# Patient Record
Sex: FEMALE | Race: WHITE | NOT HISPANIC OR LATINO | Employment: PART TIME | URBAN - METROPOLITAN AREA
[De-identification: names, ages, dates, MRNs, and addresses within clinical notes are randomized per-mention and may not be internally consistent; named-entity substitution may affect disease eponyms.]

---

## 2017-02-02 ENCOUNTER — ALLSCRIPTS OFFICE VISIT (OUTPATIENT)
Dept: OTHER | Facility: OTHER | Age: 37
End: 2017-02-02

## 2017-02-03 ENCOUNTER — GENERIC CONVERSION - ENCOUNTER (OUTPATIENT)
Dept: OTHER | Facility: OTHER | Age: 37
End: 2017-02-03

## 2017-02-10 ENCOUNTER — ALLSCRIPTS OFFICE VISIT (OUTPATIENT)
Dept: OTHER | Facility: OTHER | Age: 37
End: 2017-02-10

## 2017-02-10 DIAGNOSIS — S16.1XXA STRAIN OF MUSCLE, FASCIA AND TENDON AT NECK LEVEL, INITIAL ENCOUNTER: ICD-10-CM

## 2017-02-10 DIAGNOSIS — S39.012A STRAIN OF MUSCLE, FASCIA AND TENDON OF LOWER BACK, INITIAL ENCOUNTER: ICD-10-CM

## 2017-02-14 ENCOUNTER — GENERIC CONVERSION - ENCOUNTER (OUTPATIENT)
Dept: OTHER | Facility: OTHER | Age: 37
End: 2017-02-14

## 2017-02-22 ENCOUNTER — ALLSCRIPTS OFFICE VISIT (OUTPATIENT)
Dept: OTHER | Facility: OTHER | Age: 37
End: 2017-02-22

## 2017-03-09 ENCOUNTER — ALLSCRIPTS OFFICE VISIT (OUTPATIENT)
Dept: OTHER | Facility: OTHER | Age: 37
End: 2017-03-09

## 2017-03-16 ENCOUNTER — ALLSCRIPTS OFFICE VISIT (OUTPATIENT)
Dept: OTHER | Facility: OTHER | Age: 37
End: 2017-03-16

## 2017-03-23 ENCOUNTER — ALLSCRIPTS OFFICE VISIT (OUTPATIENT)
Dept: OTHER | Facility: OTHER | Age: 37
End: 2017-03-23

## 2017-03-30 ENCOUNTER — ALLSCRIPTS OFFICE VISIT (OUTPATIENT)
Dept: OTHER | Facility: OTHER | Age: 37
End: 2017-03-30

## 2017-05-10 ENCOUNTER — ALLSCRIPTS OFFICE VISIT (OUTPATIENT)
Dept: OTHER | Facility: OTHER | Age: 37
End: 2017-05-10

## 2017-05-25 ENCOUNTER — ALLSCRIPTS OFFICE VISIT (OUTPATIENT)
Dept: OTHER | Facility: OTHER | Age: 37
End: 2017-05-25

## 2017-05-25 DIAGNOSIS — K76.89 OTHER SPECIFIED DISORDERS OF LIVER: ICD-10-CM

## 2017-05-25 DIAGNOSIS — E55.9 VITAMIN D DEFICIENCY: ICD-10-CM

## 2017-06-23 ENCOUNTER — ALLSCRIPTS OFFICE VISIT (OUTPATIENT)
Dept: OTHER | Facility: OTHER | Age: 37
End: 2017-06-23

## 2017-06-26 ENCOUNTER — GENERIC CONVERSION - ENCOUNTER (OUTPATIENT)
Dept: OTHER | Facility: OTHER | Age: 37
End: 2017-06-26

## 2017-06-29 ENCOUNTER — ALLSCRIPTS OFFICE VISIT (OUTPATIENT)
Dept: OTHER | Facility: OTHER | Age: 37
End: 2017-06-29

## 2017-06-29 DIAGNOSIS — S16.1XXA STRAIN OF MUSCLE, FASCIA AND TENDON AT NECK LEVEL, INITIAL ENCOUNTER: ICD-10-CM

## 2017-06-29 DIAGNOSIS — M54.12 RADICULOPATHY OF CERVICAL REGION: ICD-10-CM

## 2017-06-29 DIAGNOSIS — M54.6 PAIN IN THORACIC SPINE: ICD-10-CM

## 2017-06-29 DIAGNOSIS — M54.2 CERVICALGIA: ICD-10-CM

## 2017-07-10 ENCOUNTER — ALLSCRIPTS OFFICE VISIT (OUTPATIENT)
Dept: OTHER | Facility: OTHER | Age: 37
End: 2017-07-10

## 2017-07-12 ENCOUNTER — GENERIC CONVERSION - ENCOUNTER (OUTPATIENT)
Dept: OTHER | Facility: OTHER | Age: 37
End: 2017-07-12

## 2017-07-12 ENCOUNTER — ALLSCRIPTS OFFICE VISIT (OUTPATIENT)
Dept: OTHER | Facility: OTHER | Age: 37
End: 2017-07-12

## 2017-07-24 ENCOUNTER — ALLSCRIPTS OFFICE VISIT (OUTPATIENT)
Dept: OTHER | Facility: OTHER | Age: 37
End: 2017-07-24

## 2017-08-03 ENCOUNTER — ALLSCRIPTS OFFICE VISIT (OUTPATIENT)
Dept: OTHER | Facility: OTHER | Age: 37
End: 2017-08-03

## 2017-08-04 ENCOUNTER — APPOINTMENT (OUTPATIENT)
Dept: LAB | Facility: CLINIC | Age: 37
End: 2017-08-04
Payer: COMMERCIAL

## 2017-08-04 ENCOUNTER — ALLSCRIPTS OFFICE VISIT (OUTPATIENT)
Dept: OTHER | Facility: OTHER | Age: 37
End: 2017-08-04

## 2017-08-04 ENCOUNTER — TRANSCRIBE ORDERS (OUTPATIENT)
Dept: LAB | Facility: CLINIC | Age: 37
End: 2017-08-04

## 2017-08-04 ENCOUNTER — GENERIC CONVERSION - ENCOUNTER (OUTPATIENT)
Dept: OTHER | Facility: OTHER | Age: 37
End: 2017-08-04

## 2017-08-04 DIAGNOSIS — A09 INFECTIOUS GASTROENTERITIS AND COLITIS: ICD-10-CM

## 2017-08-04 DIAGNOSIS — R31.29 OTHER MICROSCOPIC HEMATURIA: ICD-10-CM

## 2017-08-04 LAB
ALBUMIN SERPL BCP-MCNC: 3.3 G/DL (ref 3.5–5)
ALP SERPL-CCNC: 56 U/L (ref 46–116)
ALT SERPL W P-5'-P-CCNC: 28 U/L (ref 12–78)
ANION GAP SERPL CALCULATED.3IONS-SCNC: 8 MMOL/L (ref 4–13)
AST SERPL W P-5'-P-CCNC: 22 U/L (ref 5–45)
BASOPHILS # BLD AUTO: 0.01 THOUSANDS/ΜL (ref 0–0.1)
BASOPHILS NFR BLD AUTO: 0 % (ref 0–1)
BILIRUB SERPL-MCNC: 0.26 MG/DL (ref 0.2–1)
BUN SERPL-MCNC: 9 MG/DL (ref 5–25)
CALCIUM SERPL-MCNC: 9 MG/DL (ref 8.3–10.1)
CHLORIDE SERPL-SCNC: 108 MMOL/L (ref 100–108)
CO2 SERPL-SCNC: 23 MMOL/L (ref 21–32)
CREAT SERPL-MCNC: 0.61 MG/DL (ref 0.6–1.3)
CRP SERPL QL: 65.4 MG/L
EOSINOPHIL # BLD AUTO: 0.03 THOUSAND/ΜL (ref 0–0.61)
EOSINOPHIL NFR BLD AUTO: 1 % (ref 0–6)
ERYTHROCYTE [DISTWIDTH] IN BLOOD BY AUTOMATED COUNT: 13.8 % (ref 11.6–15.1)
GFR SERPL CREATININE-BSD FRML MDRD: 118 ML/MIN/1.73SQ M
GLUCOSE SERPL-MCNC: 86 MG/DL (ref 65–140)
HCT VFR BLD AUTO: 36.4 % (ref 34.8–46.1)
HGB BLD-MCNC: 12 G/DL (ref 11.5–15.4)
LYMPHOCYTES # BLD AUTO: 0.85 THOUSANDS/ΜL (ref 0.6–4.47)
LYMPHOCYTES NFR BLD AUTO: 16 % (ref 14–44)
MCH RBC QN AUTO: 29.6 PG (ref 26.8–34.3)
MCHC RBC AUTO-ENTMCNC: 33 G/DL (ref 31.4–37.4)
MCV RBC AUTO: 90 FL (ref 82–98)
MONOCYTES # BLD AUTO: 0.29 THOUSAND/ΜL (ref 0.17–1.22)
MONOCYTES NFR BLD AUTO: 5 % (ref 4–12)
NEUTROPHILS # BLD AUTO: 4.3 THOUSANDS/ΜL (ref 1.85–7.62)
NEUTS SEG NFR BLD AUTO: 78 % (ref 43–75)
NRBC BLD AUTO-RTO: 0 /100 WBCS
PLATELET # BLD AUTO: 277 THOUSANDS/UL (ref 149–390)
PMV BLD AUTO: 9.5 FL (ref 8.9–12.7)
POTASSIUM SERPL-SCNC: 3.3 MMOL/L (ref 3.5–5.3)
PROT SERPL-MCNC: 6.8 G/DL (ref 6.4–8.2)
RBC # BLD AUTO: 4.06 MILLION/UL (ref 3.81–5.12)
SODIUM SERPL-SCNC: 139 MMOL/L (ref 136–145)
WBC # BLD AUTO: 5.48 THOUSAND/UL (ref 4.31–10.16)

## 2017-08-04 PROCEDURE — 86140 C-REACTIVE PROTEIN: CPT

## 2017-08-04 PROCEDURE — 80053 COMPREHEN METABOLIC PANEL: CPT

## 2017-08-04 PROCEDURE — 85025 COMPLETE CBC W/AUTO DIFF WBC: CPT

## 2017-08-04 PROCEDURE — 36415 COLL VENOUS BLD VENIPUNCTURE: CPT

## 2017-08-04 PROCEDURE — 87040 BLOOD CULTURE FOR BACTERIA: CPT

## 2017-08-05 LAB — WRITTEN AUTHORIZATION (HISTORICAL): NORMAL

## 2017-08-08 LAB
RESULT 1 (HISTORICAL): ABNORMAL
RESULT 1 (HISTORICAL): ABNORMAL
SHIGA TOXIN TEST (HISTORICAL): POSITIVE
STOOL FOR CAMPYLOBACTER (HISTORICAL): ABNORMAL
STOOL FOR SALMONELLA OR SHIGELLA (HISTORICAL): ABNORMAL

## 2017-08-09 LAB — BACTERIA BLD CULT: NORMAL

## 2017-08-10 ENCOUNTER — TRANSCRIBE ORDERS (OUTPATIENT)
Dept: ADMINISTRATIVE | Age: 37
End: 2017-08-10

## 2017-08-10 ENCOUNTER — APPOINTMENT (OUTPATIENT)
Dept: LAB | Age: 37
End: 2017-08-10
Attending: PREVENTIVE MEDICINE
Payer: COMMERCIAL

## 2017-08-10 ENCOUNTER — APPOINTMENT (OUTPATIENT)
Dept: LAB | Facility: CLINIC | Age: 37
End: 2017-08-10
Payer: COMMERCIAL

## 2017-08-10 ENCOUNTER — TRANSCRIBE ORDERS (OUTPATIENT)
Dept: LAB | Facility: CLINIC | Age: 37
End: 2017-08-10

## 2017-08-10 ENCOUNTER — GENERIC CONVERSION - ENCOUNTER (OUTPATIENT)
Dept: OTHER | Facility: OTHER | Age: 37
End: 2017-08-10

## 2017-08-10 DIAGNOSIS — Z11.1 SCREENING EXAMINATION FOR PULMONARY TUBERCULOSIS: Primary | ICD-10-CM

## 2017-08-10 DIAGNOSIS — A09 INFECTIOUS COLITIS, ENTERITIS, AND GASTROENTERITIS: ICD-10-CM

## 2017-08-10 DIAGNOSIS — Z11.1 SCREENING EXAMINATION FOR PULMONARY TUBERCULOSIS: ICD-10-CM

## 2017-08-10 DIAGNOSIS — A09 INFECTIOUS COLITIS, ENTERITIS, AND GASTROENTERITIS: Primary | ICD-10-CM

## 2017-08-10 LAB
ALBUMIN SERPL BCP-MCNC: 3.4 G/DL (ref 3.5–5)
ALP SERPL-CCNC: 65 U/L (ref 46–116)
ALT SERPL W P-5'-P-CCNC: 23 U/L (ref 12–78)
ANION GAP SERPL CALCULATED.3IONS-SCNC: 9 MMOL/L (ref 4–13)
AST SERPL W P-5'-P-CCNC: 18 U/L (ref 5–45)
BACTERIA UR QL AUTO: ABNORMAL /HPF
BASOPHILS # BLD AUTO: 0.05 THOUSANDS/ΜL (ref 0–0.1)
BASOPHILS NFR BLD AUTO: 1 % (ref 0–1)
BILIRUB SERPL-MCNC: 0.32 MG/DL (ref 0.2–1)
BILIRUB UR QL STRIP: NEGATIVE
BUN SERPL-MCNC: 11 MG/DL (ref 5–25)
CALCIUM SERPL-MCNC: 8.8 MG/DL (ref 8.3–10.1)
CHLORIDE SERPL-SCNC: 106 MMOL/L (ref 100–108)
CLARITY UR: CLEAR
CO2 SERPL-SCNC: 25 MMOL/L (ref 21–32)
COLOR UR: YELLOW
CREAT SERPL-MCNC: 0.63 MG/DL (ref 0.6–1.3)
EOSINOPHIL # BLD AUTO: 0.19 THOUSAND/ΜL (ref 0–0.61)
EOSINOPHIL NFR BLD AUTO: 3 % (ref 0–6)
ERYTHROCYTE [DISTWIDTH] IN BLOOD BY AUTOMATED COUNT: 13.7 % (ref 11.6–15.1)
GFR SERPL CREATININE-BSD FRML MDRD: 117 ML/MIN/1.73SQ M
GLUCOSE SERPL-MCNC: 80 MG/DL (ref 65–140)
GLUCOSE UR STRIP-MCNC: NEGATIVE MG/DL
HCT VFR BLD AUTO: 36.2 % (ref 34.8–46.1)
HGB BLD-MCNC: 11.9 G/DL (ref 11.5–15.4)
HGB UR QL STRIP.AUTO: ABNORMAL
HYALINE CASTS #/AREA URNS LPF: ABNORMAL /LPF
KETONES UR STRIP-MCNC: NEGATIVE MG/DL
LEUKOCYTE ESTERASE UR QL STRIP: NEGATIVE
LYMPHOCYTES # BLD AUTO: 1.61 THOUSANDS/ΜL (ref 0.6–4.47)
LYMPHOCYTES NFR BLD AUTO: 28 % (ref 14–44)
MCH RBC QN AUTO: 29.2 PG (ref 26.8–34.3)
MCHC RBC AUTO-ENTMCNC: 32.9 G/DL (ref 31.4–37.4)
MCV RBC AUTO: 89 FL (ref 82–98)
MONOCYTES # BLD AUTO: 0.75 THOUSAND/ΜL (ref 0.17–1.22)
MONOCYTES NFR BLD AUTO: 13 % (ref 4–12)
NEUTROPHILS # BLD AUTO: 3.18 THOUSANDS/ΜL (ref 1.85–7.62)
NEUTS SEG NFR BLD AUTO: 55 % (ref 43–75)
NITRITE UR QL STRIP: NEGATIVE
NON-SQ EPI CELLS URNS QL MICRO: ABNORMAL /HPF
NRBC BLD AUTO-RTO: 0 /100 WBCS
PH UR STRIP.AUTO: 6 [PH] (ref 4.5–8)
PLATELET # BLD AUTO: 410 THOUSANDS/UL (ref 149–390)
PMV BLD AUTO: 9.4 FL (ref 8.9–12.7)
POTASSIUM SERPL-SCNC: 3.6 MMOL/L (ref 3.5–5.3)
PROT SERPL-MCNC: 6.8 G/DL (ref 6.4–8.2)
PROT UR STRIP-MCNC: NEGATIVE MG/DL
RBC # BLD AUTO: 4.07 MILLION/UL (ref 3.81–5.12)
RBC #/AREA URNS AUTO: ABNORMAL /HPF
RESULT 1 (HISTORICAL): ABNORMAL
RESULT 1 (HISTORICAL): ABNORMAL
SHIGA TOXIN TEST (HISTORICAL): POSITIVE
SODIUM SERPL-SCNC: 140 MMOL/L (ref 136–145)
SP GR UR STRIP.AUTO: 1.02 (ref 1–1.03)
STOOL FOR CAMPYLOBACTER (HISTORICAL): ABNORMAL
STOOL FOR SALMONELLA OR SHIGELLA (HISTORICAL): ABNORMAL
SUSCEP. REFLEX (HISTORICAL): ABNORMAL
UROBILINOGEN UR QL STRIP.AUTO: 0.2 E.U./DL
WBC # BLD AUTO: 5.81 THOUSAND/UL (ref 4.31–10.16)
WBC #/AREA URNS AUTO: ABNORMAL /HPF
WRITTEN AUTHORIZATION (HISTORICAL): NORMAL

## 2017-08-10 PROCEDURE — 36415 COLL VENOUS BLD VENIPUNCTURE: CPT

## 2017-08-10 PROCEDURE — 85025 COMPLETE CBC W/AUTO DIFF WBC: CPT

## 2017-08-10 PROCEDURE — 86480 TB TEST CELL IMMUN MEASURE: CPT

## 2017-08-10 PROCEDURE — 80053 COMPREHEN METABOLIC PANEL: CPT

## 2017-08-10 PROCEDURE — 81001 URINALYSIS AUTO W/SCOPE: CPT

## 2017-08-12 LAB
ANNOTATION COMMENT IMP: NORMAL
GAMMA INTERFERON BACKGROUND BLD IA-ACNC: 0.06 IU/ML
M TB IFN-G BLD-IMP: NEGATIVE
M TB IFN-G CD4+ BCKGRND COR BLD-ACNC: 0.03 IU/ML
M TB IFN-G CD4+ T-CELLS BLD-ACNC: 0.09 IU/ML
MITOGEN IGNF BLD-ACNC: >10 IU/ML
QUANTIFERON-TB GOLD IN TUBE: NORMAL
SERVICE CMNT-IMP: NORMAL

## 2017-08-14 ENCOUNTER — GENERIC CONVERSION - ENCOUNTER (OUTPATIENT)
Dept: OTHER | Facility: OTHER | Age: 37
End: 2017-08-14

## 2017-08-22 ENCOUNTER — APPOINTMENT (OUTPATIENT)
Dept: LAB | Facility: CLINIC | Age: 37
End: 2017-08-22
Payer: COMMERCIAL

## 2017-08-22 ENCOUNTER — TRANSCRIBE ORDERS (OUTPATIENT)
Dept: LAB | Facility: CLINIC | Age: 37
End: 2017-08-22

## 2017-08-22 DIAGNOSIS — A09 INFECTIOUS GASTROENTERITIS AND COLITIS: ICD-10-CM

## 2017-08-22 DIAGNOSIS — R31.29 OTHER MICROSCOPIC HEMATURIA: ICD-10-CM

## 2017-08-22 LAB
BACTERIA UR QL AUTO: ABNORMAL /HPF
BILIRUB UR QL STRIP: NEGATIVE
CLARITY UR: CLEAR
COLOR UR: YELLOW
GLUCOSE UR STRIP-MCNC: NEGATIVE MG/DL
HGB UR QL STRIP.AUTO: NEGATIVE
HYALINE CASTS #/AREA URNS LPF: ABNORMAL /LPF
KETONES UR STRIP-MCNC: NEGATIVE MG/DL
LEUKOCYTE ESTERASE UR QL STRIP: ABNORMAL
NITRITE UR QL STRIP: NEGATIVE
NON-SQ EPI CELLS URNS QL MICRO: ABNORMAL /HPF
PH UR STRIP.AUTO: 6 [PH] (ref 4.5–8)
PROT UR STRIP-MCNC: NEGATIVE MG/DL
RBC #/AREA URNS AUTO: ABNORMAL /HPF
SP GR UR STRIP.AUTO: 1.02 (ref 1–1.03)
UROBILINOGEN UR QL STRIP.AUTO: 0.2 E.U./DL
WBC #/AREA URNS AUTO: ABNORMAL /HPF

## 2017-08-22 PROCEDURE — 81001 URINALYSIS AUTO W/SCOPE: CPT

## 2017-08-30 LAB — Lab: NORMAL

## 2017-09-06 DIAGNOSIS — M54.50 LOW BACK PAIN: ICD-10-CM

## 2017-09-06 DIAGNOSIS — R31.29 OTHER MICROSCOPIC HEMATURIA: ICD-10-CM

## 2017-09-06 DIAGNOSIS — A09 INFECTIOUS GASTROENTERITIS AND COLITIS: ICD-10-CM

## 2017-09-06 DIAGNOSIS — M41.9 SCOLIOSIS: ICD-10-CM

## 2017-09-06 DIAGNOSIS — M54.31 SCIATICA OF RIGHT SIDE: ICD-10-CM

## 2017-09-08 ENCOUNTER — GENERIC CONVERSION - ENCOUNTER (OUTPATIENT)
Dept: OTHER | Facility: OTHER | Age: 37
End: 2017-09-08

## 2017-09-12 ENCOUNTER — GENERIC CONVERSION - ENCOUNTER (OUTPATIENT)
Dept: OTHER | Facility: OTHER | Age: 37
End: 2017-09-12

## 2017-09-29 ENCOUNTER — ALLSCRIPTS OFFICE VISIT (OUTPATIENT)
Dept: OTHER | Facility: OTHER | Age: 37
End: 2017-09-29

## 2017-09-29 ENCOUNTER — GENERIC CONVERSION - ENCOUNTER (OUTPATIENT)
Dept: OTHER | Facility: OTHER | Age: 37
End: 2017-09-29

## 2017-10-13 ENCOUNTER — GENERIC CONVERSION - ENCOUNTER (OUTPATIENT)
Dept: OTHER | Facility: OTHER | Age: 37
End: 2017-10-13

## 2017-11-16 ENCOUNTER — ALLSCRIPTS OFFICE VISIT (OUTPATIENT)
Dept: OTHER | Facility: OTHER | Age: 37
End: 2017-11-16

## 2017-12-06 ENCOUNTER — LAB REQUISITION (OUTPATIENT)
Dept: LAB | Facility: HOSPITAL | Age: 37
End: 2017-12-06
Payer: COMMERCIAL

## 2017-12-06 ENCOUNTER — GENERIC CONVERSION - ENCOUNTER (OUTPATIENT)
Dept: OTHER | Facility: OTHER | Age: 37
End: 2017-12-06

## 2017-12-06 DIAGNOSIS — N39.0 URINARY TRACT INFECTION: ICD-10-CM

## 2017-12-06 DIAGNOSIS — M62.838 OTHER MUSCLE SPASM: ICD-10-CM

## 2017-12-06 DIAGNOSIS — M99.9 BIOMECHANICAL LESION: ICD-10-CM

## 2017-12-06 PROCEDURE — 87086 URINE CULTURE/COLONY COUNT: CPT | Performed by: FAMILY MEDICINE

## 2017-12-07 LAB — BACTERIA UR CULT: NORMAL

## 2017-12-27 ENCOUNTER — GENERIC CONVERSION - ENCOUNTER (OUTPATIENT)
Dept: OTHER | Facility: OTHER | Age: 37
End: 2017-12-27

## 2018-01-11 NOTE — MISCELLANEOUS
Message  Return to work or school:        Flu vaccine deferred until acute illness resolved  Darleen ESPARZA        Signatures   Electronically signed by : ISAIAS Ochoa; Nov 16 2017  1:11PM EST                       (Author)

## 2018-01-12 VITALS
HEIGHT: 64 IN | SYSTOLIC BLOOD PRESSURE: 120 MMHG | BODY MASS INDEX: 21 KG/M2 | RESPIRATION RATE: 18 BRPM | OXYGEN SATURATION: 99 % | WEIGHT: 123 LBS | DIASTOLIC BLOOD PRESSURE: 86 MMHG | HEART RATE: 76 BPM

## 2018-01-12 VITALS
SYSTOLIC BLOOD PRESSURE: 116 MMHG | HEIGHT: 64 IN | TEMPERATURE: 97.1 F | HEART RATE: 96 BPM | WEIGHT: 128 LBS | DIASTOLIC BLOOD PRESSURE: 82 MMHG | BODY MASS INDEX: 21.85 KG/M2

## 2018-01-12 VITALS
BODY MASS INDEX: 21.85 KG/M2 | DIASTOLIC BLOOD PRESSURE: 88 MMHG | WEIGHT: 128 LBS | OXYGEN SATURATION: 99 % | SYSTOLIC BLOOD PRESSURE: 128 MMHG | HEIGHT: 64 IN | TEMPERATURE: 97.1 F | HEART RATE: 95 BPM | RESPIRATION RATE: 18 BRPM

## 2018-01-12 VITALS
OXYGEN SATURATION: 98 % | HEIGHT: 64 IN | DIASTOLIC BLOOD PRESSURE: 100 MMHG | HEART RATE: 98 BPM | BODY MASS INDEX: 21 KG/M2 | RESPIRATION RATE: 18 BRPM | WEIGHT: 123 LBS | SYSTOLIC BLOOD PRESSURE: 144 MMHG

## 2018-01-12 NOTE — RESULT NOTES
Verified Results  * MRI THORACIC SPINE W WO CONTRAST [id number] 09:43AM [facility]     Test Name Result Flag Reference   MRI THORACIC SPINE W [address] (Report)     This is a summary report  The complete report is available in the patient's medical record  If you cannot access the medical record, please contact the sending organization for a detailed fax or copy  MRI THORACIC SPINE WITH AND WITHOUT CONTRAST     INDICATION: Chronic mid thoracic spine pain often to the left of midline, along the border of scapula, history of scoliosis     COMPARISON: None  TECHNIQUE: Sagittal T1, sagittal T2, sagittal inversion recovery, axial T2, axial 2D MERGE  Sagittal and axial T1 postcontrast      IV Contrast: Gadobutrol injection (SINGLE-DOSE) SOLN 5 mL Note: (SINGLE DOSE/MULTI DOSE) information refers to the container from which the contrast was acquired  Contrast was injected one time intravenously without immediate complication  IMAGE QUALITY: Diagnostic  FINDINGS:     ALIGNMENT: Straightening of normal thoracic kyphosis  Right convex T7-8 apex scoliosis  Marked degenerative changes along can cavity of this curve  No segmentation anomalies are evident  MARROW SIGNAL: Normal marrow signal is identified within the visualized bony structures  No discrete marrow lesion  THORACIC CORD: Normal signal within the thoracic cord  No intrinsic cord pathology or cord compression  Conus terminates below the L1 level and, is not included on this study  Cord is displaced towards the concavity of scoliosis  PREVERTEBRAL AND PARASPINAL SOFT TISSUES: Prevertebral and paraspinal soft tissues are unremarkable  THORACIC DEGENERATIVE CHANGE: No disc herniation, canal stenosis or foraminal narrowing  No degenerative changes  POSTCONTRAST: No abnormal enhancement  IMPRESSION:     Right convex mid thoracic scoliosis  No segmentation anomalies or intrinsic pathology of the thoracic cord   Termination of the cord below the L1 level and not included on this study  No compressive cord or root disease  Workstation performed: [id number]     Signed by: Julia Maloney MD   12/28/16     *US BREAST LEFT LIMITED (DIAGNOSTIC) [id number] 08:51AM [facility] Order Number: [id number]    - Patient Instructions: To schedule this appointment, please contact Central Scheduling at [phone]  Test Name Result Flag Reference   US BREAST LEFT LIMITED (Report)     Follow-up breast cysts  US Breast Left Limited: December 28, 2016 - Check In #: [[id number]]   Technologist: MARÍA Groves RDMS RVT RDCS   At the 3 o'clock position of the left breast is a slightly    complex cyst measures 0 3 x 0 2 x 0 3 cm, previously measured at    0 2 cm  Also at the 3 o'clock position, 4 cm from the nipple is a complex   cyst which measures 0 5 x 0 3 x 0 4 cm, previously 0 6 x 0 4 x    0 4 cm  No suspicious hypoechoic mass or architectural    distortion to suggest malignancy  Two slightly complex cysts which appear stable  ASSESSMENT: BiRad:2 - Benign     Recommendation:   Clinical management of the left breast      Transcription Location: [facility] 98: [id number]     Risk Value(s):   Tyrer-Cuzick 10 Year: 0 764%, Tyrer-Cuzick Lifetime: 11 799%,    Myriad Table: 1 5%   Signed by:   Chilo Blackman MD   12/28/16     (1) CBC/ PLT (NO DIFF) [id number] 02:23PM [facility]     Test Name Result Flag Reference   WBC 5 4 x10E3/uL  3 4-10 8   RBC 3 90 x10E6/uL  3 77-5 28   Hemoglobin 12 2 g/dL  11 1-15 9   Hematocrit 36 5 %  34 0-46  6   MCV 94 fL  79-97   MCH 31 3 pg  26 6-33 0   MCHC 33 4 g/dL  31 5-35 7   RDW 13 2 %  12 3-15 4   Platelets 883 T57J6/DT  150-379     (1) COMPREHENSIVE METABOLIC PANEL 95PMV1889 96:35IS Mellody Clipper     Test Name Result Flag Reference   Glucose, Serum 103 mg/dL H 65-99   BUN 16 mg/dL  6-20   Creatinine, Serum 0 74 mg/dL  0 57-1 00   eGFR If NonAfricn Am 106 mL/min/1 73  >59   eGFR If Africn Am 122 mL/min/1 73  >59   BUN/Creatinine Ratio 22 H 8-20   Sodium, Serum 140 mmol/L  134-144   Potassium, Serum 4 3 mmol/L  3 5-5 2   Chloride, Serum 103 mmol/L     Carbon Dioxide, Total 22 mmol/L  18-29   Calcium, Serum 8 7 mg/dL  8 7-10 2   Protein, Total, Serum 6 3 g/dL  6 0-8 5   Albumin, Serum 4 0 g/dL  3 5-5 5   Globulin, Total 2 3 g/dL  1 5-4 5   A/G Ratio 1 7  1 1-2 5   Bilirubin, Total 0 2 mg/dL  0 0-1 2   Alkaline Phosphatase, S 50 IU/L     AST (SGOT) 19 IU/L  0-40   ALT (SGPT) 13 IU/L  0-32     (1) LIPID PANEL FASTING W DIRECT LDL REFLEX [id number] 02:23PM [facility]     Test Name Result Flag Reference   Cholesterol, Total 168 mg/dL  100-199   Triglycerides 83 mg/dL  0-149   HDL Cholesterol 67 mg/dL  >39   LDL Cholesterol Calc 84 mg/dL  0-99     (1) VITAMIN D 25-HYDROXY 97Chg6570 02:23PM [facility]     Test Name Result Flag Reference   Vitamin D, 25-Hydroxy 26 7 ng/mL L 30 0-100 0   Vitamin D deficiency has been defined by the [address] practice guideline as a  level of serum 25-OH vitamin D less than 20 ng/mL (1,2)  The Endocrine Society went on to further define vitamin D  insufficiency as a level between 21 and 29 ng/mL (2)  1  IOM ([place] of Medicine)  2010  Dietary reference     intakes for calcium and D  [facility]: The     [employer]  2  Jack MF, Lorenzo NC, Fannie HUNG, et al      Evaluation, treatment, and prevention of vitamin D     deficiency: an Endocrine Society clinical practice     guideline  JCEM  2011 Jul; 96(7):1911-30  (1) FERRITIN 14CQG7968 02:23PM [facility]     Test Name Result Flag Reference   Ferritin, Serum 10 ng/mL L      [facility]) Devin Nash CMP14 Default [id number] 02:23PM [facility]     Test Name Result Flag Reference   Gaila Evens CMP14 Default Comment     A hand-written panel/profile was received from your office   In  accordance with the [facility] Ambiguous Test Code Policy dated July 0675, we have completed your order by using the closest currently  or formerly recognized AMA panel  We have assigned Comprehensive  Metabolic Panel (14), Test Code #301614 to this request   If this  is not the testing you wished to receive on this specimen, please  contact the [address] [place] [address] Client Inquiry/Technical Services Department  to clarify the test order  We appreciate your business

## 2018-01-13 VITALS
HEIGHT: 64 IN | RESPIRATION RATE: 18 BRPM | SYSTOLIC BLOOD PRESSURE: 122 MMHG | DIASTOLIC BLOOD PRESSURE: 84 MMHG | WEIGHT: 126.44 LBS | BODY MASS INDEX: 21.59 KG/M2 | HEART RATE: 92 BPM | OXYGEN SATURATION: 98 %

## 2018-01-13 VITALS
DIASTOLIC BLOOD PRESSURE: 98 MMHG | OXYGEN SATURATION: 98 % | HEART RATE: 80 BPM | RESPIRATION RATE: 16 BRPM | BODY MASS INDEX: 21.68 KG/M2 | SYSTOLIC BLOOD PRESSURE: 140 MMHG | WEIGHT: 127 LBS | TEMPERATURE: 98.9 F | HEIGHT: 64 IN

## 2018-01-13 VITALS
SYSTOLIC BLOOD PRESSURE: 129 MMHG | HEART RATE: 79 BPM | DIASTOLIC BLOOD PRESSURE: 85 MMHG | BODY MASS INDEX: 22.2 KG/M2 | WEIGHT: 130 LBS | RESPIRATION RATE: 16 BRPM | HEIGHT: 64 IN

## 2018-01-13 VITALS
BODY MASS INDEX: 21 KG/M2 | RESPIRATION RATE: 16 BRPM | WEIGHT: 123 LBS | SYSTOLIC BLOOD PRESSURE: 108 MMHG | HEIGHT: 64 IN | TEMPERATURE: 97.2 F | DIASTOLIC BLOOD PRESSURE: 72 MMHG | HEART RATE: 76 BPM

## 2018-01-14 VITALS
WEIGHT: 128 LBS | RESPIRATION RATE: 18 BRPM | DIASTOLIC BLOOD PRESSURE: 70 MMHG | SYSTOLIC BLOOD PRESSURE: 110 MMHG | HEART RATE: 96 BPM | BODY MASS INDEX: 21.85 KG/M2 | HEIGHT: 64 IN

## 2018-01-14 VITALS
BODY MASS INDEX: 21.51 KG/M2 | WEIGHT: 126 LBS | HEIGHT: 64 IN | TEMPERATURE: 97.2 F | RESPIRATION RATE: 18 BRPM | SYSTOLIC BLOOD PRESSURE: 112 MMHG | OXYGEN SATURATION: 99 % | HEART RATE: 82 BPM | DIASTOLIC BLOOD PRESSURE: 82 MMHG

## 2018-01-14 VITALS
WEIGHT: 123 LBS | BODY MASS INDEX: 21 KG/M2 | DIASTOLIC BLOOD PRESSURE: 96 MMHG | HEART RATE: 98 BPM | RESPIRATION RATE: 18 BRPM | SYSTOLIC BLOOD PRESSURE: 136 MMHG | OXYGEN SATURATION: 98 % | HEIGHT: 64 IN

## 2018-01-14 VITALS
OXYGEN SATURATION: 99 % | HEART RATE: 97 BPM | SYSTOLIC BLOOD PRESSURE: 110 MMHG | HEIGHT: 64 IN | DIASTOLIC BLOOD PRESSURE: 60 MMHG | WEIGHT: 130 LBS | RESPIRATION RATE: 18 BRPM | TEMPERATURE: 98.4 F | BODY MASS INDEX: 22.2 KG/M2

## 2018-01-14 VITALS
HEART RATE: 80 BPM | SYSTOLIC BLOOD PRESSURE: 129 MMHG | WEIGHT: 130 LBS | DIASTOLIC BLOOD PRESSURE: 88 MMHG | HEIGHT: 64 IN | BODY MASS INDEX: 22.2 KG/M2

## 2018-01-14 VITALS
BODY MASS INDEX: 21.51 KG/M2 | HEIGHT: 64 IN | DIASTOLIC BLOOD PRESSURE: 80 MMHG | HEART RATE: 92 BPM | RESPIRATION RATE: 18 BRPM | WEIGHT: 126 LBS | SYSTOLIC BLOOD PRESSURE: 132 MMHG

## 2018-01-14 VITALS
BODY MASS INDEX: 21 KG/M2 | WEIGHT: 123 LBS | DIASTOLIC BLOOD PRESSURE: 90 MMHG | RESPIRATION RATE: 18 BRPM | HEIGHT: 64 IN | OXYGEN SATURATION: 98 % | HEART RATE: 94 BPM | SYSTOLIC BLOOD PRESSURE: 128 MMHG

## 2018-01-15 VITALS
BODY MASS INDEX: 22.2 KG/M2 | HEART RATE: 80 BPM | WEIGHT: 130 LBS | TEMPERATURE: 98.8 F | SYSTOLIC BLOOD PRESSURE: 136 MMHG | RESPIRATION RATE: 16 BRPM | DIASTOLIC BLOOD PRESSURE: 88 MMHG | HEIGHT: 64 IN

## 2018-01-15 NOTE — MISCELLANEOUS
Message   Recorded as Task   Date: 09/07/2017 04:58 PM, Created By: Yakov Gan   Task Name: Call Back   Assigned To: [employer]UE [employer],team   Regarding Patient: Yuni Beltran, Status: Active   CommentElzie American - 07 Sep 2017 4:58 PM     TASK CREATED  Caller: Self; [phone] (Home); [phone] [id number] (Work)  DR Seth Wright - PT IS RETURNING YOUR CALL    CALL HER CELL BACK [phone]   i returned call      Signatures   Electronically signed by : SERVANDO Amaro ; Sep  8 2017  2:08PM EST                       (Author)

## 2018-01-16 NOTE — RESULT NOTES
Verified Results  [facility]) Written Authorization [id number] 12:00AM [facility]     Test Name Result Flag Reference   Written Authorization Comment     Written Authorization Received    Authorization received from NOT NEEDED 08-  Logged by Clarita Hunter

## 2018-01-16 NOTE — PROGRESS NOTES
Assessment    1  Segmental and somatic dysfunction of cervical region (739 1) (M99 01)    Plan   Segmental and somatic dysfunction of cervical region    · OMT 1-2 body regions - POC; Status:Complete;   Done: 20UPN2520    Endocet 5-325 MG Oral Tablet; 1 tab 4 times daily; Therapy: 89WIK2457 to (Last Rx:70Uki4511); Status: ACTIVE Ordered  Rx By: [facility]; Dispense: 0 Days ; #:120 Tablet; Refill: 0;   For: Nonallopathic lesion, Other muscle spasm; DEBBY = N; Print Rx     Chief Complaint  here for OMT      History of Present Illness  here for OMT on her back       Active Problems    1  Acne (706 1) (L70 9)   2  Acquired ankle/foot deformity (736 70) (M21 969)   3  Breast mass, left (611 72) (N63)   4  Bunion, unspecified laterality (727 1) (M20 10)   5  Cervical neuritis (723 4) (M54 12)   6  Cervical pain (723 1) (M54 2)   7  Chronic fatigue (780 79) (R53 82)   8  Difficulty walking (719 7) (R26 2)   9  Generalized anxiety disorder (300 02) (F41 1)   10  GERD (gastroesophageal reflux disease) (530 81) (K21 9)   11  Hallux valgus (735 0) (M20 10)   12  Insomnia (780 52) (G47 00)   13  Irritable bowel syndrome without diarrhea (564 1) (K58 9)   14  Kyphoscoliosis (737 30) (M41 9)   15  Liver cyst (573 8) (K76 89)   16  Nonallopathic lesion (739 9) (M99 9)   17  Other muscle spasm (728 85) (M62 838)   18  Pes planus, congenital (754 61) (Q66 50)   19  Pes planus, unspecified laterality (734) (M21 40)   20  Pleural effusion, bilateral (511 9) (J90)   21  Scoliosis (and kyphoscoliosis), idiopathic (737 30) (M41 20)   22  Screening for cardiovascular condition (V81 2) (Z13 6)   23  Screening for diabetes mellitus (DM) (V77 1) (Z13 1)   24  Segmental and somatic dysfunction of cervical region (739 1) (M99 01)   25  Segmental and somatic dysfunction of thoracic region (739 2) (M99 02)   26  Thoracic spine pain (724 1) (M54 6)   27  Thyroid disorder screening (V77 0) (Z13 29)    Past Medical History    1   History of Abdominal discomfort (789 00) (R10 9)   2  History of Abdominal discomfort (789 00) (R10 9)   3  History of Abdominal pain, RLQ (right lower quadrant) (789 03) (R10 31)   4  History of Acute upper respiratory infection (465 9) (J06 9)   5  History of Acute upper respiratory infection (465 9) (J06 9)   6  History of Acute UTI (599 0) (N39 0)   7  History of Anxiety (300 00) (F41 9)   8  History of Burning with urination (788 1) (R30 0)   9  History of Cellulitis (682 9) (L03 90)   10  History of Endometriosis (617 9) (N80 9)   11  History of Hallux valgus, acquired (735 0) (M20 10)   12  History of abdominal pain (V13 89) (Z87 898)   13  History of acute sinusitis (V12 69) (Z87 09)   14  History of backache (V13 59) (Z87 39)   15  History of ectopic pregnancy (V13 29) (Z87 59)   16  History of fatigue (V13 89) (Z87 898)   17  History of fever (V13 89) (Z87 898)   18  History of hypoglycemia (V12 29) (Z86 39)   19  History of hypoglycemia (V12 29) (Z86 39)   20  History of hypoglycemia (V12 29) (Z86 39)   21  History of ingrown nail (V13 3) (Z87 2)   22  History of insomnia (V13 89) (Z87 898)   23  History of migraine (V12 49) (Z86 69)   24  History of migraine (V12 49) (Z86 69)   25  History of scoliosis (V13 59) (Z87 39)   26  History of Lesion of liver (573 8) (K76 9)   27  History of Limb pain (729 5) (M79 609)   28  History of Limb pain (729 5) (M79 609)   29  History of Malaise (780 79) (R53 81)   30  History of Malaise (780 79) (R53 81)   31  History of Menopause (627 2)   32  History of Other muscle spasm (728 85) (M62 838)   33  History of Ovarian cyst (620 2) (N83 20)   34  History of Paronychia of toe, unspecified laterality (681 11) (L03 039)   35  History of Pes planus, unspecified laterality (734) (M21 40)   36  History of Scoliosis (737 30) (M41 9)   37  History of Symptomatic menopausal or female climacteric states (627 2) (N95 1)   38  History of Thoracic back pain (724 1) (M54 6)   39   History of Unspecified Disorder Of Soft Tissue (729 90)   40  History of Unspecified Disorder Of Soft Tissue (729 90)    The active problems and past medical history were reviewed and updated today  Surgical History    1  History of  Section   2  History of  Section   3  History of Exploratory Laparoscopy   4  History of Tubal Ligation    The surgical history was reviewed and updated today  Family History  Mother    1  Family history of hypertension (V17 49) (Z82 49)   2  Family history of skin cancer (V16 8) (Z80 8)   3  Family history of High cholesterol  Father    4  Family history of High cholesterol  Grandmother    5  Family history of Brain Cancer (V16 8)   6  Family history of Lung Cancer (V16 1)  Maternal Grandfather    7  Family history of lung cancer (V16 1) (Z80 1)   8  Family history of Oral cancer   9  Family history of Throat cancer  Uncle    10  Family history of Exposure to [place]   11  Family history of alcoholism (V17 0) (Z81 1)   12  Family history of lung cancer (V16 1) (Z80 1)   13  Family history of malignant neoplasm of prostate (V16 42) (Z80 42)   14  Family history of Tobacco abuse  Family History    15  Family history of Anxiety Disorder NOS   16  Family history of Diabetes Mellitus (V18 0)   17  Family history of Heart Disease (V17 49)   18  Family history of Malignant Neoplasm Of The Lip, Oral Cavity, Or Pharynx    The family history was reviewed and updated today  Social History    · Denied: History of Drug use   · Former smoker (V15 82) (I98 192)   · Occupation:    Current Meds   1  Dicyclomine HCl - 10 MG Oral Capsule; TAKE 1 CAPSULE 3 TIMES DAILY AT 8AM 2PM   AND 8PM;   Therapy: 19ACY4091 to (Last Rx:00Onq9719)  Requested for: 16PPD3351 Ordered   2  Endocet 5-325 MG Oral Tablet; 1 tab 4 times daily; Therapy: 33SQK0861 to (Last Rx:41Aad2760) Ordered   3  Ibuprofen 600 MG Oral Tablet; Therapy: (0676 543 19 15) to Recorded   4   Lisinopril 10 MG Oral Tablet; TAKE 1 TABLET DAILY; Therapy: 34DDS1638 to (Evaluate:63Wmw2816)  Requested for: 07WSL7608; Last   Rx:13Nov2015 Ordered   5  Omeprazole 20 MG Oral Capsule Delayed Release; Take one capsule once daily before   eating; Therapy: 28IQZ4210 to (Aly Solis)  Requested for: 48WYM8544; Last   Rx:14Fhk8954 Ordered   6  Tramadol-Acetaminophen 37 5-325 MG Oral Tablet; 1 po QID; Therapy: 77OMR2255 to (Last Rx:24Mar2016) Ordered   7  Voltaren 1 % Transdermal Gel; APPLY TO LOWER EXTREMITIES, 4 GM OF GEL TO   AFFECTED AREA 4 TIMES DAILY  DO NOT APPLY MORE THAN 16 GM DAILY TO ANY   ONE AFFECTED JOINT; Therapy: 23BXH2412 to (Evaluate:90Kts9701)  Requested for: 27NPE7317; Last   Rx:10Mar2016 Ordered   8  Zolpidem Tartrate 5 MG Oral Tablet; take 1 tablet at  bedtime as needed for insomnia; Therapy: 43Fhr5563 to (Evaluate:10Kgt7563); Last BF:08OQD3131 Ordered    Allergies    1  Depo-Medrol SUSP   2  Sulfa Drugs    Vitals  Vital Signs    Recorded: 72ZBF6587 86:22HW   Systolic 391   Diastolic 84   Height 5 ft 4 in   Weight 123 lb    BMI Calculated 21 11   BSA Calculated 1 59     Physical Exam    Region Evaluated and Treated: Thoracic T1- T4   Examination Method: Passive  Severity:  Osteopathic Findings: muscle spasm and tightness T$ T 6 rotaed right sidebent left  Treatment Method:  Response:   Region Evaluated and Treated: Thoracic T10 - T12   Examination Method:  Severity:   Osteopathic Findings: T 11 rrsl  Treatment Method: High Velocity, Low Amplitude Treatment and Muscle Energy Treatment  Response:      Future Appointments    Date/Time Provider Specialty Site   08/11/2016 07:00 PM TARA Pennington  [address] [place] [address]   08/25/2016 07:00 PM TARA Pennington  Family [facility]     Signatures   Electronically signed by :  Saintclair Gal, D O ; Aug 10 2016  2:28PM EST                       (Author)

## 2018-01-16 NOTE — PROGRESS NOTES
Assessment    1  Mass of breast (611 72) (N63)   2  Screening for depression (V79 0) (Z13 89)   3  Encounter for preventive health examination (V70 0) (Z00 00)    Plan   Endocet 5-325 MG Oral Tablet; 1 tab every 4 hours as needed; Therapy: 24RZH5453 to (Last Rx:65Caz4092); Status: ACTIVE Ordered  Rx By: Cortes Romeo; Dispense: 0 Days ; #:120 Tablet; Refill: 0;   For: Nonallopathic lesion, Other muscle spasm; DEBBY = N; Print Rx  *US BREAST LEFT LIMITED (DIAGNOSTIC); Status:Resulted - Requires Verification;   Done: 17SFD5291 12:00AM  Due:05Wnr7914;Ordered; For:Mass of breast; Ordered By:Scarlett East;   * MRI THORACIC SPINE W WO CONTRAST; Status:Need Information - Financial Authorization; Requested for:01Dec2016;   Perform:[facility]; VYQ:58BDS5845;MFOOMCU; For:Segmental and somatic dysfunction of thoracic region, Thoracic spine pain; Ordered By:Scarlett East;      Chief Complaint  CPE       History of Present Illness  HM, Adult Female: The patient is being seen for a health maintenance evaluation  The last health maintenance visit was 1 year(s) ago  Social History: Household members include domestic partner  She is unmarried  Work status: going to school  The patient has never smoked cigarettes  She reports rare alcohol use  The patient has no concerns about alcohol abuse  She has never used illicit drugs  General Health:   Lifestyle:  She consumes a diverse and healthy diet  She does not have any weight concerns  She exercises regularly  She uses tobacco  She denies drug use  Reproductive health: the patient is premenopausal    Screening: Additional History:  has her own gyn she will have pap sent  Review of Systems    Constitutional: No fever, no chills, feels well, no tiredness, no recent weight gain or weight loss  Eyes: No complaints of eye pain, no red eyes, no eyesight problems, no discharge, no dry eyes, no itching of eyes     ENT: no complaints of earache, no loss of hearing, no nose bleeds, no nasal discharge, no sore throat, no hoarseness  Cardiovascular: No complaints of slow heart rate, no fast heart rate, no chest pain, no palpitations, no leg claudication, no lower extremity edema  Respiratory: No complaints of shortness of breath, no wheezing, no cough, no SOB on exertion, no orthopnea, no PND  Gastrointestinal: No complaints of abdominal pain, no constipation, no nausea or vomiting, no diarrhea, no bloody stools  Genitourinary: No complaints of dysuria, no incontinence, no pelvic pain, no dysmenorrhea, no vaginal discharge or bleeding  Musculoskeletal: chronic neck and back pain disc in neck  Integumentary: acne  Neurological: No complaints of headache, no confusion, no convulsions, no numbness, no dizziness or fainting, no tingling, no limb weakness, no difficulty walking  Psychiatric: Not suicidal, no sleep disturbance, no anxiety or depression, no change in personality, no emotional problems  Active Problems     1  Acne (706 1) (L70 9)   2  Acquired ankle/foot deformity (736 70) (M21 969)   3  Breast mass, left (611 72) (N63)   4  Bunion, unspecified laterality   5  Cervical neuritis (723 4) (M54 12)   6  Chronic fatigue (780 79) (R53 82)   7  Difficulty walking (719 7) (R26 2)   8  Generalized anxiety disorder (300 02) (F41 1)   9  GERD (gastroesophageal reflux disease) (530 81) (K21 9)   10  Hallux valgus (735 0) (M20 10)   11  Insomnia (780 52) (G47 00)   12  Irritable bowel syndrome without diarrhea (564 1) (K58 9)   13  Kyphoscoliosis (737 30) (M41 9)   14  Liver cyst (573 8) (K76 89)   15  Nonallopathic lesion (739 9) (M99 9)   16  Other muscle spasm (728 85) (M62 838)   17  Pes planus, congenital (754 61) (Q66 50)   18  Pes planus, unspecified laterality (734) (M21 40)   19  Pleural effusion, bilateral (511 9) (J90)   20  Scoliosis (and kyphoscoliosis), idiopathic (737 30) (M41 20)   21   Screening for cardiovascular condition (V81 2) (Z13 6)   22  Screening for diabetes mellitus (DM) (V77 1) (Z13 1)   23  Segmental and somatic dysfunction of cervical region (739 1) (M99 01)   24  Segmental and somatic dysfunction of thoracic region (739 2) (M99 02)   25   Thyroid disorder screening (V77 0) (Z13 29)    Thoracic spine pain (724 1) (M54 6)       Cervical pain (723 1) (M54 2)          Past Medical History     · History of Abdominal discomfort (789 00) (R10 9)   · History of Abdominal discomfort (789 00) (R10 9)   · History of Abdominal pain, RLQ (right lower quadrant) (789 03) (R10 31)   · History of Acute upper respiratory infection (465 9) (J06 9)   · History of Acute upper respiratory infection (465 9) (J06 9)   · History of Acute UTI (599 0) (N39 0)   · History of Anxiety (300 00) (F41 9)   · History of Burning with urination (788 1) (R30 0)   · History of Cellulitis (682 9) (L03 90)   · History of Endometriosis (617 9) (N80 9)   · History of Hallux valgus, acquired (735 0) (M20 10)   · History of abdominal pain (V13 89) (X34 421)   · History of acute sinusitis (V12 69) (Z87 09)   · History of backache (V13 59) (Z87 39)   · History of ectopic pregnancy (V13 29) (Z87 59)   · History of fatigue (V13 89) (A11 418)   · History of fever (V13 89) (H10 882)   · History of hypoglycemia (V12 29) (Z86 39)   · History of hypoglycemia (V12 29) (Z86 39)   · History of hypoglycemia (V12 29) (Z86 39)   · History of ingrown nail (V13 3) (Z87 2)   · History of insomnia (V13 89) (Z87 898)   · History of migraine (V12 49) (Z86 69)   · History of migraine (V12 49) (Z86 69)   · History of scoliosis (V13 59) (Z87 39)   · History of Lesion of liver (573 8) (K76 9)   · History of Limb pain (729 5) (M79 609)   · History of Limb pain (729 5) (M79 609)   · History of Malaise (780 79) (R53 81)   · History of Malaise (780 79) (R53 81)   · History of Menopause (627 2)   · History of Other muscle spasm (728 85) (M82 127)   · History of Ovarian cyst (620 2) (N83 20)   · History of Paronychia of toe, unspecified laterality (681 11) (L03 039)   · History of Pes planus, unspecified laterality (734) (M21 40)   · History of Symptomatic menopausal or female climacteric states (627 2) (N95 1)   · History of Thoracic back pain (724 1) (M54 6)   · History of Unspecified Disorder Of Soft Tissue (729 90)   · History of Unspecified Disorder Of Soft Tissue (729 90)    History of Scoliosis (737 30) (M41 9)          Surgical History    · History of  Section   · History of  Section   · History of Exploratory Laparoscopy   · History of Tubal Ligation    Family History  Mother    · Family history of hypertension (V17 49) (Z82 49)   · Family history of skin cancer (V16 8) (Z80 8)   · Family history of High cholesterol  Father    · Family history of High cholesterol  Grandmother    · Family history of Brain Cancer (V16 8)   · Family history of Lung Cancer (V16 1)  Maternal Grandfather    · Family history of lung cancer (V16 1) (Z80 1)   · Family history of Oral cancer   · Family history of Throat cancer  Uncle    · Family history of Exposure to [employer]   · Family history of alcoholism (V17 0) (Z81 1)   · Family history of lung cancer (V16 1) (Z80 1)   · Family history of malignant neoplasm of prostate (V16 42) (Z80 45)   · Family history of Tobacco abuse  Family History    · Family history of Anxiety Disorder NOS   · Family history of Diabetes Mellitus (V18 0)   · Family history of Heart Disease (V17 49)   · Family history of Malignant Neoplasm Of The Lip, Oral Cavity, Or Pharynx    Social History    · Denied: History of Drug use   · Former smoker (V15 82) (E62 932)   · Occupation:   ·  at night    Current Meds   1  Aczone 5 % External Gel; apply BID; Therapy: 87REZ9863 to (Last Rx:62Iiw9284)  Requested for: 23QLW5017 Ordered   2   Dicyclomine HCl - 10 MG Oral Capsule; TAKE 1 CAPSULE 3 TIMES DAILY AT 8AM 2PM   AND 8PM;   Therapy: 41WUC6621 to (Last Rx:38Hti9128)  Requested for: 85PHB5221 Ordered   3  Endocet 5-325 MG Oral Tablet; 1 tab every 4 hours as needed; Therapy: 20VPT6104 to (Last Rx:04Nov2016) Ordered   4  Ibuprofen 600 MG Oral Tablet; Therapy: [phone]) to Recorded   5  Tramadol-Acetaminophen 37 5-325 MG Oral Tablet; 1 po QID; Therapy: 90GRU8731 to (Last Rx:24Mar2016) Ordered   6  Zolpidem Tartrate 5 MG Oral Tablet; take 1 tablet at  bedtime as needed for insomnia; Therapy: 58Vvl2883 to (Evaluate:03Jan2017); Last SV:08OXR7985 Ordered    Allergies    1  DEPO-Medrol SUSP   2  Sulfa Drugs    Vitals   Recorded: 06CAG5399 01:34PM   Temperature 98 6 F   Heart Rate 90   Respiration 18   Systolic 508   Diastolic 88   Height 5 ft 4 in   Weight 121 lb    BMI Calculated 20 77   BSA Calculated 1 58     Physical Exam    Constitutional   General appearance: No acute distress, well appearing and well nourished  Head and Face   Head and face: Normal     Palpation of the face and sinuses: No sinus tenderness  Eyes   Conjunctiva and lids: No swelling, erythema or discharge  Pupils and irises: Equal, round, reactive to light  Ears, Nose, Mouth, and Throat   External inspection of ears and nose: Normal     Otoscopic examination: Tympanic membranes translucent with normal light reflex  Canals patent without erythema  Hearing: Normal     Nasal mucosa, septum, and turbinates: Normal without edema or erythema  Lips, teeth, and gums: Normal, good dentition  Oropharynx: Normal with no erythema, edema, exudate or lesions  Neck   Neck: Supple, symmetric, trachea midline, no masses  Thyroid: Normal, no thyromegaly  Pulmonary   Respiratory effort: No increased work of breathing or signs of respiratory distress  Auscultation of lungs: Clear to auscultation  Cardiovascular   Auscultation of heart: Normal rate and rhythm, normal S1 and S2, no murmurs  Abdomen   Abdomen: Non-tender, no masses  Liver and spleen: No hepatomegaly or splenomegaly  Musculoskeletal   Gait and station: Normal     Digits and nails: Normal without clubbing or cyanosis  decreased ROM neck  Results/Data  PHQ-2 Adult Depression Screening 75XMP7565 03:03PM User, Ahs     Test Name Result Flag Reference   PHQ-2 Adult Depression Score 0     Over the last two weeks, how often have you been bothered by any of the following problems? Little interest or pleasure in doing things: Not at all - 0  Feeling down, depressed, or hopeless: Not at all - 0   PHQ-2 Adult Depression Screening Negative         Future Appointments    Date/Time Provider Specialty Site   03/24/2017 09:30 AM SERVANDO Coleman  Orthopedic Surgery [facility]   02/17/2017 01:00 PM TARA Jeffries  [facility]   02/22/2017 08:30 AM TARA Jeffries  [facility]   03/03/2017 10:15 AM TARA Jeffries  [address]   03/09/2017 01:00 PM TARA Jeffries  [facility]   03/16/2017 01:00 PM TARA Jeffries  [facility]     Signatures   Electronically signed by :  Dwane Goodpasture, D O ; Feb 11 2017  6:37PM EST                       (Author)

## 2018-01-22 VITALS
HEART RATE: 109 BPM | HEIGHT: 64 IN | BODY MASS INDEX: 22.36 KG/M2 | DIASTOLIC BLOOD PRESSURE: 98 MMHG | SYSTOLIC BLOOD PRESSURE: 135 MMHG | WEIGHT: 131 LBS

## 2018-01-22 VITALS
RESPIRATION RATE: 18 BRPM | HEIGHT: 64 IN | WEIGHT: 132 LBS | BODY MASS INDEX: 22.53 KG/M2 | HEART RATE: 97 BPM | OXYGEN SATURATION: 100 % | DIASTOLIC BLOOD PRESSURE: 84 MMHG | TEMPERATURE: 98.1 F | SYSTOLIC BLOOD PRESSURE: 120 MMHG

## 2018-01-22 VITALS
TEMPERATURE: 98.2 F | WEIGHT: 132 LBS | SYSTOLIC BLOOD PRESSURE: 104 MMHG | DIASTOLIC BLOOD PRESSURE: 70 MMHG | HEIGHT: 64 IN | HEART RATE: 88 BPM | BODY MASS INDEX: 22.53 KG/M2 | OXYGEN SATURATION: 100 % | RESPIRATION RATE: 18 BRPM

## 2018-01-22 VITALS
SYSTOLIC BLOOD PRESSURE: 118 MMHG | HEART RATE: 100 BPM | BODY MASS INDEX: 22.2 KG/M2 | DIASTOLIC BLOOD PRESSURE: 76 MMHG | OXYGEN SATURATION: 98 % | WEIGHT: 130 LBS | TEMPERATURE: 97.2 F | RESPIRATION RATE: 18 BRPM | HEIGHT: 64 IN

## 2018-01-24 VITALS
OXYGEN SATURATION: 99 % | RESPIRATION RATE: 16 BRPM | WEIGHT: 128 LBS | SYSTOLIC BLOOD PRESSURE: 110 MMHG | HEART RATE: 111 BPM | DIASTOLIC BLOOD PRESSURE: 60 MMHG | HEIGHT: 64 IN | BODY MASS INDEX: 21.85 KG/M2

## 2018-01-24 VITALS
SYSTOLIC BLOOD PRESSURE: 120 MMHG | TEMPERATURE: 98.5 F | WEIGHT: 134 LBS | BODY MASS INDEX: 22.88 KG/M2 | DIASTOLIC BLOOD PRESSURE: 80 MMHG | RESPIRATION RATE: 16 BRPM | HEIGHT: 64 IN

## 2018-01-24 NOTE — PROGRESS NOTES
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  General Health: The patient's health since the last visit is described as good  She has regular dental visits  She denies vision problems  She denies hearing loss  Immunizations status: up to date  Lifestyle:  She consumes a diverse and healthy diet  She does not have any weight concerns  She exercises regularly  She does not use tobacco  She denies alcohol use  She denies drug use  Reproductive health: the patient is premenopausal    Screening: Active Problems    1  Acne (706 1) (L70 9)   2  Breast mass, left (611 72) (N63 20)   3  Bunion, unspecified laterality   4  Cervical neuritis (723 4) (M54 12)   5  Contusion of left lower arm, initial encounter (923 10) (S50 12XA)   6  Generalized anxiety disorder (300 02) (F41 1)   7  GERD (gastroesophageal reflux disease) (530 81) (K21 9)   8  Insomnia (780 52) (G47 00)   9  Irritable bowel syndrome without diarrhea (564 1) (K58 9)   10  Kyphoscoliosis (737 30) (M41 9)   11  Liver cyst (573 8) (K76 89)   12  Lumbago (724 2) (M54 5)   13  Lumbar strain (847 2) (S39 012A)   14  Mass of breast (611 72) (N63 0)   15  Mild vitamin D deficiency (268 9) (E55 9)   16  Other muscle spasm (728 85) (M62 838)   17  Pes planus, unspecified laterality (734) (M21 40)   18  Pleural effusion, bilateral (511 9) (J90)   19  Refused influenza vaccine (V64 06) (Z28 21)   20  Scoliosis (and kyphoscoliosis), idiopathic (737 30) (M41 20)   21  Segmental and somatic dysfunction of cervical region (739 1) (M99 01)   22   Thyroid disorder screening (V77 0) (Z13 29)    Past Medical History    · History of Abdominal discomfort (789 00) (R10 9)   · History of Abdominal discomfort (789 00) (R10 9)   · History of Abdominal pain, RLQ (right lower quadrant) (789 03) (R10 31)   · History of Acute upper respiratory infection (465 9) (J06 9)   · History of Acute upper respiratory infection (465 9) (J06 9)   · History of Acute UTI (599 0) (N39 0)   · History of Anxiety (300 00) (F41 9)   · History of Burning with urination (788 1) (R30 0)   · History of Cellulitis (682 9) (L03 90)   · History of Cervical strain (847 0) (S16 1XXA)   · History of Chronic fatigue (780 79) (R53 82)   · History of Endometriosis (617 9) (N80 9)   · History of Hallux valgus, acquired (735 0) (M20 10)   · History of abdominal pain (V13 89) (M50 736)   · History of acute sinusitis (V12 69) (Z87 09)   · History of backache (V13 59) (Z87 39)   · History of ectopic pregnancy (V13 29) (Z87 59)   · History of fatigue (V13 89) (P04 126)   · History of fever (V13 89) (B21 031)   · History of hypoglycemia (V12 29) (Z86 39)   · History of hypoglycemia (V12 29) (Z86 39)   · History of hypoglycemia (V12 29) (Z86 39)   · History of ingrown nail (V13 3) (Z87 2)   · History of insomnia (V13 89) (Z87 898)   · History of migraine (V12 49) (Z86 69)   · History of migraine (V12 49) (Z86 69)   · History of scoliosis (V13 59) (Z87 39)   · History of Lesion of liver (573 8) (K76 9)   · History of Limb pain (729 5) (M79 609)   · History of Limb pain (729 5) (M79 609)   · History of Lumbar strain (847 2) (S39 012A)   · History of Malaise (780 79) (R53 81)   · History of Malaise (780 79) (R53 81)   · History of Menopause (627 2)   · History of Other muscle spasm (728 85) (C04 409)   · History of Ovarian cyst (620 2) (N83 20)   · History of Paronychia of toe, unspecified laterality (681 11) (L03 039)   · History of Pes planus, unspecified laterality (734) (M21 40)   · History of Screening for cardiovascular condition (V81 2) (Z13 6)   · History of Screening for depression (V79 0) (Z13 89)   · History of Screening for diabetes mellitus (DM) (V77 1) (Z13 1)   · History of Symptomatic menopausal or female climacteric states (627 2) (N95 1)   · History of Thoracic back pain (724 1) (M54 6)   · History of Unspecified Disorder Of Soft Tissue (729 90)   · History of Unspecified Disorder Of Soft Tissue (729 90)    Surgical History    · History of  Section   · History of  Section   · History of Exploratory Laparoscopy   · History of Tubal Ligation    Family History  Mother    · Family history of hypertension (V17 49) (Z82 49)   · Family history of skin cancer (V16 8) (Z80 8)   · Family history of High cholesterol  Father    · Family history of High cholesterol  Grandmother    · Family history of Brain Cancer (V16 8)   · Family history of Lung Cancer (V16 1)  Maternal Grandfather    · Family history of lung cancer (V16 1) (Z80 1)   · Family history of Oral cancer   · Family history of Throat cancer  Uncle    · Family history of Exposure to [employer]   · Family history of alcoholism (V17 0) (Z81 1)   · Family history of lung cancer (V16 1) (Z80 1)   · Family history of malignant neoplasm of prostate (V16 42) (Z80 45)   · Family history of Tobacco abuse  Family History    · Family history of Anxiety Disorder NOS   · Family history of Diabetes Mellitus (V18 0)   · Family history of Heart Disease (V17 49)   · Family history of Malignant Neoplasm Of The Lip, Oral Cavity, Or Pharynx    Social History    · Denied: History of Drug use   · Former smoker (V15 82) (Y87 822)   · Occupation:   ·  at night    Current Meds   1  Aczone 5 % External Gel; apply BID; Therapy: 16PPJ5372 to (Last Shahana Tone)  Requested for: 41WWB8725 Ordered   2  Dicyclomine HCl - 10 MG Oral Capsule; TAKE 1 CAPSULE 3 TIMES DAILY AT 8AM 2PM   AND 8PM;   Therapy: 79LKD1725 to (Last Rx:11Wxt8212)  Requested for: 20NMD8575 Ordered   3  Endocet 5-325 MG Oral Tablet; 1 tab every 4 hours as needed;    Therapy: 90HSG3679 to (Last Rx: 95PNZ7962) Ordered   4  Lisinopril 10 MG Oral Tablet; Take one tablet daily; Therapy: 69UEC1586 to (Daniella Rodríguez)  Requested for: 65JXS2825; Last   Rx:58Gex3796 Ordered   5  Zolpidem Tartrate 5 MG Oral Tablet; take 1 tablet at  bedtime as needed for insomnia; Therapy: 06Hte1844 to (Evaluate:36Tsz7676); Last Rx:30Mar2017 Ordered    Allergies    1  DEPO-Medrol SUSP   2  Sulfa Drugs    Vitals   Recorded: 29Crh3238 03:55PM   Temperature 97 2 F   Heart Rate 100   Respiration 18   Systolic 923   Diastolic 76   Height 5 ft 4 in   Weight 130 lb    BMI Calculated 22 31   BSA Calculated 1 63   O2 Saturation 98   Pain Scale 0     Physical Exam    Constitutional   General appearance: No acute distress, well appearing and well nourished  Head and Face   Head and face: Normal     Palpation of the face and sinuses: No sinus tenderness  Eyes   Conjunctiva and lids: No swelling, erythema or discharge  Ears, Nose, Mouth, and Throat   External inspection of ears and nose: Normal     Otoscopic examination: Tympanic membranes translucent with normal light reflex  Canals patent without erythema  Oropharynx: Normal with no erythema, edema, exudate or lesions  Neck   Neck: Supple, symmetric, trachea midline, no masses  Thyroid: Normal, no thyromegaly  Pulmonary   Respiratory effort: No increased work of breathing or signs of respiratory distress  Auscultation of lungs: Clear to auscultation  Cardiovascular   Auscultation of heart: Normal rate and rhythm, normal S1 and S2, no murmurs  Abdomen   Abdomen: Non-tender, no masses  Liver and spleen: No hepatomegaly or splenomegaly  Musculoskeletal   Gait and station: Normal     Digits and nails: Normal without clubbing or cyanosis  Stability: Normal     Skin   Skin and subcutaneous tissue: Normal without rashes or lesions      Psychiatric   Judgment and insight: Normal     Orientation to person, place, and time: Normal     Recent and remote memory: Intact  Mood and affect: Normal        Signatures   Electronically signed by :  TARA Jimenez ; Jan 23 2018  4:18PM EST                       (Author)
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
## 2018-02-01 ENCOUNTER — OFFICE VISIT (OUTPATIENT)
Dept: FAMILY MEDICINE CLINIC | Facility: CLINIC | Age: 38
End: 2018-02-01
Payer: COMMERCIAL

## 2018-02-01 VITALS
SYSTOLIC BLOOD PRESSURE: 120 MMHG | BODY MASS INDEX: 22.45 KG/M2 | RESPIRATION RATE: 18 BRPM | HEIGHT: 62 IN | WEIGHT: 122 LBS | OXYGEN SATURATION: 100 % | HEART RATE: 92 BPM | DIASTOLIC BLOOD PRESSURE: 80 MMHG

## 2018-02-01 DIAGNOSIS — M54.50 CHRONIC BILATERAL LOW BACK PAIN WITHOUT SCIATICA: Primary | ICD-10-CM

## 2018-02-01 DIAGNOSIS — G89.29 CHRONIC BILATERAL LOW BACK PAIN WITHOUT SCIATICA: Primary | ICD-10-CM

## 2018-02-01 PROCEDURE — 98925 OSTEOPATH MANJ 1-2 REGIONS: CPT | Performed by: FAMILY MEDICINE

## 2018-02-01 RX ORDER — DAPSONE 50 MG/G
GEL TOPICAL 2 TIMES DAILY
COMMUNITY
Start: 2016-11-17 | End: 2018-08-30 | Stop reason: SDUPTHER

## 2018-02-01 RX ORDER — ZOLPIDEM TARTRATE 5 MG/1
5 TABLET ORAL DAILY
COMMUNITY
Start: 2016-06-02 | End: 2018-06-27 | Stop reason: SDUPTHER

## 2018-02-01 RX ORDER — TRAMADOL HYDROCHLORIDE 50 MG/1
50 TABLET ORAL EVERY 6 HOURS
COMMUNITY
End: 2018-02-15 | Stop reason: SDUPTHER

## 2018-02-01 RX ORDER — OXYCODONE HYDROCHLORIDE AND ACETAMINOPHEN 5; 325 MG/1; MG/1
1 TABLET ORAL EVERY 4 HOURS PRN
Qty: 120 TABLET | Refills: 0 | Status: SHIPPED | OUTPATIENT
Start: 2018-02-01 | End: 2018-03-01 | Stop reason: SDUPTHER

## 2018-02-01 RX ORDER — LISINOPRIL 10 MG/1
TABLET ORAL
Refills: 0 | COMMUNITY
Start: 2018-01-18 | End: 2018-11-08 | Stop reason: SDUPTHER

## 2018-02-01 RX ORDER — CYCLOBENZAPRINE HCL 5 MG
10 TABLET ORAL 3 TIMES DAILY PRN
Qty: 30 TABLET | Refills: 0 | Status: SHIPPED | OUTPATIENT
Start: 2018-02-01 | End: 2018-04-12

## 2018-02-01 RX ORDER — OXYCODONE HYDROCHLORIDE AND ACETAMINOPHEN 5; 325 MG/1; MG/1
TABLET ORAL
COMMUNITY
Start: 2016-05-05 | End: 2018-02-01 | Stop reason: SDUPTHER

## 2018-02-02 NOTE — PROGRESS NOTES
Assessment/Plan:    No problem-specific Assessment & Plan notes found for this encounter  Diagnoses and all orders for this visit:    Chronic bilateral low back pain without sciatica  -     oxyCODONE-acetaminophen (ENDOCET) 5-325 mg per tablet; Take 1 tablet by mouth every 4 (four) hours as needed for moderate pain Max Daily Amount: 6 tablets  -     cyclobenzaprine (FLEXERIL) 5 mg tablet; Take 2 tablets (10 mg total) by mouth 3 (three) times a day as needed for muscle spasms    Other orders  -     Dapsone (ACZONE) 5 % topical gel; Apply topically 2 (two) times a day  -     cholecalciferol (VITAMIN D3) 52470 units capsule; Take 10,000 Units by mouth  -     Discontinue: oxyCODONE-acetaminophen (ENDOCET) 5-325 mg per tablet; Take by mouth  -     lisinopril (ZESTRIL) 10 mg tablet;   -     traMADol (ULTRAM) 50 mg tablet; Take 50 mg by mouth every 6 (six) hours  -     zolpidem (AMBIEN) 5 mg tablet; Take 5 mg by mouth daily      OMT was done in 2 regions cervical and thoracic    Subjective:      Patient ID: Vincent Roger is a 30 y o  female  HPI    The following portions of the patient's history were reviewed and updated as appropriate: allergies, current medications, past family history, past medical history, past social history, past surgical history and problem list     Review of Systems   Musculoskeletal: Positive for back pain (Some lumbar back pain 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

## 2018-02-15 ENCOUNTER — OFFICE VISIT (OUTPATIENT)
Dept: FAMILY MEDICINE CLINIC | Facility: CLINIC | Age: 38
End: 2018-02-15
Payer: COMMERCIAL

## 2018-02-15 VITALS
HEIGHT: 62 IN | SYSTOLIC BLOOD PRESSURE: 136 MMHG | RESPIRATION RATE: 18 BRPM | HEART RATE: 139 BPM | DIASTOLIC BLOOD PRESSURE: 90 MMHG | WEIGHT: 122 LBS | OXYGEN SATURATION: 100 % | BODY MASS INDEX: 22.45 KG/M2

## 2018-02-15 DIAGNOSIS — M54.2 CERVICALGIA: Primary | ICD-10-CM

## 2018-02-15 DIAGNOSIS — M54.6 CHRONIC BILATERAL THORACIC BACK PAIN: ICD-10-CM

## 2018-02-15 DIAGNOSIS — G89.29 CHRONIC BILATERAL THORACIC BACK PAIN: ICD-10-CM

## 2018-02-15 PROCEDURE — 98925 OSTEOPATH MANJ 1-2 REGIONS: CPT | Performed by: FAMILY MEDICINE

## 2018-02-15 RX ORDER — TRAMADOL HYDROCHLORIDE 50 MG/1
50 TABLET ORAL EVERY 6 HOURS
Qty: 30 TABLET | Refills: 0 | Status: SHIPPED | OUTPATIENT
Start: 2018-02-15 | End: 2018-03-27 | Stop reason: SDUPTHER

## 2018-02-19 NOTE — PROGRESS NOTES
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 her neck is rather tight as well as her shoulders no radicular pain today         The following portions of the patient's history were reviewed and updated as appropriate: allergies, current medications, past family history, past social history, past surgical history and problem list     Review of Systems   Constitutional: Negative  HENT: Negative  Eyes: Negative  Respiratory: Negative  Cardiovascular: Negative      Musculoskeletal:        As per chief complaint         Objective:      /90   Pulse (!) 139   Resp 18   Ht 5' 2" (1 575 m)   Wt 55 3 kg (122 lb)   SpO2 100%   BMI 22 31 kg/m²          Physical Exam      Her neck reveals some fine trigger point areas with her cervical muscle spasm extending into the shoulders with T2 and T3 rotated right side bent left

## 2018-03-01 ENCOUNTER — OFFICE VISIT (OUTPATIENT)
Dept: FAMILY MEDICINE CLINIC | Facility: CLINIC | Age: 38
End: 2018-03-01
Payer: COMMERCIAL

## 2018-03-01 VITALS
HEIGHT: 62 IN | SYSTOLIC BLOOD PRESSURE: 128 MMHG | WEIGHT: 123 LBS | DIASTOLIC BLOOD PRESSURE: 84 MMHG | OXYGEN SATURATION: 97 % | BODY MASS INDEX: 22.63 KG/M2 | HEART RATE: 110 BPM

## 2018-03-01 DIAGNOSIS — M54.50 CHRONIC BILATERAL LOW BACK PAIN WITHOUT SCIATICA: ICD-10-CM

## 2018-03-01 DIAGNOSIS — G89.29 CHRONIC BILATERAL THORACIC BACK PAIN: ICD-10-CM

## 2018-03-01 DIAGNOSIS — M54.6 CHRONIC BILATERAL THORACIC BACK PAIN: ICD-10-CM

## 2018-03-01 DIAGNOSIS — M54.2 CERVICALGIA: Primary | ICD-10-CM

## 2018-03-01 DIAGNOSIS — G89.29 CHRONIC BILATERAL LOW BACK PAIN WITHOUT SCIATICA: ICD-10-CM

## 2018-03-01 DIAGNOSIS — J06.9 UPPER RESPIRATORY TRACT INFECTION, UNSPECIFIED TYPE: ICD-10-CM

## 2018-03-01 PROCEDURE — 3008F BODY MASS INDEX DOCD: CPT | Performed by: FAMILY MEDICINE

## 2018-03-01 PROCEDURE — 99213 OFFICE O/P EST LOW 20 MIN: CPT | Performed by: FAMILY MEDICINE

## 2018-03-01 RX ORDER — OXYCODONE HYDROCHLORIDE AND ACETAMINOPHEN 5; 325 MG/1; MG/1
1 TABLET ORAL EVERY 4 HOURS PRN
Qty: 120 TABLET | Refills: 0 | Status: SHIPPED | OUTPATIENT
Start: 2018-03-01 | End: 2018-04-12 | Stop reason: SDUPTHER

## 2018-03-01 NOTE — LETTER
March 1, 2018     Patient: Moon Mosquera   YOB: 1980   Date of Visit: 3/1/2018       To Whom it May Concern:    Michael Hobson is under my professional care  She was seen in my office on 3/1/2018  Please excuse for 2/28/18  If you have any questions or concerns, please don't hesitate to call           Sincerely,          Paris Roles, DO        CC: No Recipients

## 2018-03-02 NOTE — PROGRESS NOTES
Assessment/Plan:    No problem-specific Assessment & Plan notes found for this encounter  Diagnoses and all orders for this visit:    Cervicalgia    Chronic bilateral low back pain without sciatica  -     oxyCODONE-acetaminophen (ENDOCET) 5-325 mg per tablet; Take 1 tablet by mouth every 4 (four) hours as needed for moderate pain Max Daily Amount: 6 tablets    Chronic bilateral thoracic back pain          Subjective:      Patient ID: Amina Verma is a 30 y o  female  Patient is here for her every other week OMT she has some more soreness between her scapulas taken last time no radicular symptoms she also had a fever yesterday and a mild fever the day before with some nasal congestion dry cough  Fever with MAXIMUM TEMPERATURE was 101        The following portions of the patient's history were reviewed and updated as appropriate: allergies, current medications, past family history, past social history, past surgical history and problem list     Review of Systems   Constitutional: Negative  HENT: Positive for postnasal drip and sinus pressure  Respiratory: Negative  Dry cough   Cardiovascular: Negative  Gastrointestinal: Negative            Objective:      /84   Pulse (!) 110   Ht 5' 2" (1 575 m)   Wt 55 8 kg (123 lb)   SpO2 97%   BMI 22 50 kg/m²          Physical Exam        Musculoskeletal exam physical exam of the cervical spine reveals multiple trigger point tenderness along the paracervical musculature she does not like HVLA done on her neck for her history of   degenerative joint disease and bulging disksCurve with T4 through T7 rotated left inside where left      Muscle energy and HVLA a prior applied to the thoracic spine and only muscle energy and massage to the cervical spine

## 2018-03-27 DIAGNOSIS — M54.2 CERVICALGIA: ICD-10-CM

## 2018-03-27 RX ORDER — TRAMADOL HYDROCHLORIDE 50 MG/1
50 TABLET ORAL EVERY 6 HOURS
Qty: 30 TABLET | Refills: 0 | Status: SHIPPED | OUTPATIENT
Start: 2018-03-27 | End: 2018-07-19 | Stop reason: SDUPTHER

## 2018-04-12 ENCOUNTER — OFFICE VISIT (OUTPATIENT)
Dept: FAMILY MEDICINE CLINIC | Facility: CLINIC | Age: 38
End: 2018-04-12
Payer: COMMERCIAL

## 2018-04-12 VITALS
SYSTOLIC BLOOD PRESSURE: 138 MMHG | HEART RATE: 90 BPM | OXYGEN SATURATION: 98 % | BODY MASS INDEX: 23.78 KG/M2 | TEMPERATURE: 98 F | RESPIRATION RATE: 18 BRPM | DIASTOLIC BLOOD PRESSURE: 88 MMHG | WEIGHT: 130 LBS

## 2018-04-12 DIAGNOSIS — G89.29 CHRONIC BILATERAL LOW BACK PAIN WITHOUT SCIATICA: ICD-10-CM

## 2018-04-12 DIAGNOSIS — M54.50 CHRONIC BILATERAL LOW BACK PAIN WITHOUT SCIATICA: ICD-10-CM

## 2018-04-12 PROCEDURE — 99213 OFFICE O/P EST LOW 20 MIN: CPT | Performed by: FAMILY MEDICINE

## 2018-04-12 PROCEDURE — 98925 OSTEOPATH MANJ 1-2 REGIONS: CPT | Performed by: FAMILY MEDICINE

## 2018-04-12 RX ORDER — NAPROXEN 500 MG/1
TABLET ORAL
Refills: 0 | COMMUNITY
Start: 2018-03-16 | End: 2018-05-11 | Stop reason: SDUPTHER

## 2018-04-12 RX ORDER — FERROUS SULFATE 325(65) MG
325 TABLET ORAL
COMMUNITY
End: 2018-07-19 | Stop reason: SDUPTHER

## 2018-04-19 RX ORDER — OXYCODONE HYDROCHLORIDE AND ACETAMINOPHEN 5; 325 MG/1; MG/1
1 TABLET ORAL EVERY 4 HOURS PRN
Qty: 120 TABLET | Refills: 0 | Status: SHIPPED | OUTPATIENT
Start: 2018-04-19 | End: 2018-05-11 | Stop reason: SDUPTHER

## 2018-04-19 NOTE — PROGRESS NOTES
Assessment/Plan:    No problem-specific Assessment & Plan notes found for this encounter  Diagnoses and all orders for this visit:    Chronic bilateral low back pain without sciatica  -     oxyCODONE-acetaminophen (ENDOCET) 5-325 mg per tablet; Take 1 tablet by mouth every 4 (four) hours as needed for moderate pain Earliest Fill Date: 4/19/18 Max Daily Amount: 6 tablets    Other orders  -     naproxen (NAPROSYN) 500 mg tablet;   -     ferrous sulfate 325 (65 Fe) mg tablet; Take 325 mg by mouth daily with breakfast          Subjective:      Patient ID: Peggy Woodruff is a 41 y o  female  HPI  Patient is here for her regular OMT treatment she is doing fairly well right now not much muscle spasm interscapularly or in her neck  The following portions of the patient's history were reviewed and updated as appropriate: allergies, past family history, past surgical history and problem list     Review of Systems   Musculoskeletal:        Chronic neck and interscapular pain         Objective:      /88   Pulse 90   Temp 98 °F (36 7 °C)   Resp 18   Wt 59 kg (130 lb)   SpO2 98%   BMI 23 78 kg/m²          Physical Exam   Constitutional: She appears well-developed and well-nourished     Musculoskeletal:   C2 is rotated right side bent left she does not want any high velocity techniques such as muscle energy and massage was done in that area is a group curve 3-3 and 4 rotated left side bent right high velocity technique was used with good results

## 2018-05-11 DIAGNOSIS — M54.50 CHRONIC BILATERAL LOW BACK PAIN WITHOUT SCIATICA: ICD-10-CM

## 2018-05-11 DIAGNOSIS — G89.29 CHRONIC BILATERAL LOW BACK PAIN WITHOUT SCIATICA: ICD-10-CM

## 2018-05-11 RX ORDER — OXYCODONE HYDROCHLORIDE AND ACETAMINOPHEN 5; 325 MG/1; MG/1
1 TABLET ORAL EVERY 4 HOURS PRN
Qty: 120 TABLET | Refills: 0 | Status: SHIPPED | OUTPATIENT
Start: 2018-05-11 | End: 2018-06-14 | Stop reason: SDUPTHER

## 2018-05-11 RX ORDER — NAPROXEN 500 MG/1
500 TABLET ORAL 2 TIMES DAILY WITH MEALS
Qty: 60 TABLET | Refills: 0 | Status: SHIPPED | OUTPATIENT
Start: 2018-05-11 | End: 2018-07-19 | Stop reason: SDUPTHER

## 2018-06-14 ENCOUNTER — OFFICE VISIT (OUTPATIENT)
Dept: FAMILY MEDICINE CLINIC | Facility: CLINIC | Age: 38
End: 2018-06-14
Payer: COMMERCIAL

## 2018-06-14 VITALS
WEIGHT: 130 LBS | DIASTOLIC BLOOD PRESSURE: 78 MMHG | HEART RATE: 105 BPM | SYSTOLIC BLOOD PRESSURE: 120 MMHG | RESPIRATION RATE: 18 BRPM | OXYGEN SATURATION: 100 % | BODY MASS INDEX: 23.78 KG/M2

## 2018-06-14 DIAGNOSIS — M54.50 CHRONIC BILATERAL LOW BACK PAIN WITHOUT SCIATICA: Primary | ICD-10-CM

## 2018-06-14 DIAGNOSIS — G89.29 CHRONIC BILATERAL LOW BACK PAIN WITHOUT SCIATICA: Primary | ICD-10-CM

## 2018-06-14 PROCEDURE — 99213 OFFICE O/P EST LOW 20 MIN: CPT | Performed by: FAMILY MEDICINE

## 2018-06-14 PROCEDURE — 98925 OSTEOPATH MANJ 1-2 REGIONS: CPT | Performed by: FAMILY MEDICINE

## 2018-06-14 RX ORDER — OXYCODONE HYDROCHLORIDE AND ACETAMINOPHEN 5; 325 MG/1; MG/1
1 TABLET ORAL EVERY 4 HOURS PRN
Qty: 120 TABLET | Refills: 0 | Status: SHIPPED | OUTPATIENT
Start: 2018-06-14 | End: 2018-07-20 | Stop reason: SDUPTHER

## 2018-06-26 NOTE — PROGRESS NOTES
Assessment/Plan:    No problem-specific Assessment & Plan notes found for this encounter  Diagnoses and all orders for this visit:    Chronic bilateral low back pain without sciatica  -     oxyCODONE-acetaminophen (ENDOCET) 5-325 mg per tablet; Take 1 tablet by mouth every 4 (four) hours as needed for moderate pain Max Daily Amount: 6 tablets  -     XR spine lumbar 2 or 3 views injury; Future          Subjective:      Patient ID: Kendall Mayen is a 41 y o  female  Patient is here for her OMT treatment for chronic neck and thoracic back pain she is doing much better recently        The following portions of the patient's history were reviewed and updated as appropriate: past family history, past social history, past surgical history and problem list     Review of Systems   Eyes: Negative  Respiratory: Negative  Cardiovascular: Negative            Objective:      /78   Pulse 105   Resp 18   Wt 59 kg (130 lb)   SpO2 100%   BMI 23 78 kg/m²          Physical Exam   Musculoskeletal:   She has some paraspinal cervical tenderness which was muscle energy and massage was used and she had a T2-3 for lesion rotated right side than left which is corrected with HVLA

## 2018-06-27 DIAGNOSIS — F51.01 PRIMARY INSOMNIA: Primary | ICD-10-CM

## 2018-06-27 RX ORDER — ZOLPIDEM TARTRATE 5 MG/1
5 TABLET ORAL DAILY
Qty: 30 TABLET | Refills: 0 | Status: SHIPPED | OUTPATIENT
Start: 2018-06-27 | End: 2018-07-19 | Stop reason: SDUPTHER

## 2018-07-18 ENCOUNTER — TRANSCRIBE ORDERS (OUTPATIENT)
Dept: ADMINISTRATIVE | Facility: HOSPITAL | Age: 38
End: 2018-07-18

## 2018-07-18 ENCOUNTER — HOSPITAL ENCOUNTER (OUTPATIENT)
Dept: RADIOLOGY | Facility: HOSPITAL | Age: 38
Discharge: HOME | End: 2018-07-18
Attending: FAMILY MEDICINE
Payer: COMMERCIAL

## 2018-07-18 DIAGNOSIS — M54.50 CHRONIC BILATERAL LOW BACK PAIN WITHOUT SCIATICA: ICD-10-CM

## 2018-07-18 DIAGNOSIS — G89.29 CHRONIC BILATERAL LOW BACK PAIN WITHOUT SCIATICA: ICD-10-CM

## 2018-07-18 PROCEDURE — 72100 X-RAY EXAM L-S SPINE 2/3 VWS: CPT

## 2018-07-19 ENCOUNTER — OFFICE VISIT (OUTPATIENT)
Dept: FAMILY MEDICINE CLINIC | Facility: CLINIC | Age: 38
End: 2018-07-19
Payer: COMMERCIAL

## 2018-07-19 VITALS
OXYGEN SATURATION: 100 % | BODY MASS INDEX: 23.78 KG/M2 | SYSTOLIC BLOOD PRESSURE: 120 MMHG | HEART RATE: 97 BPM | RESPIRATION RATE: 18 BRPM | DIASTOLIC BLOOD PRESSURE: 84 MMHG | WEIGHT: 130 LBS

## 2018-07-19 DIAGNOSIS — L70.0 ACNE VULGARIS: ICD-10-CM

## 2018-07-19 DIAGNOSIS — M54.2 CERVICALGIA: ICD-10-CM

## 2018-07-19 DIAGNOSIS — G89.29 CHRONIC BILATERAL LOW BACK PAIN WITHOUT SCIATICA: ICD-10-CM

## 2018-07-19 DIAGNOSIS — F51.01 PRIMARY INSOMNIA: ICD-10-CM

## 2018-07-19 DIAGNOSIS — D50.8 IRON DEFICIENCY ANEMIA SECONDARY TO INADEQUATE DIETARY IRON INTAKE: Primary | ICD-10-CM

## 2018-07-19 DIAGNOSIS — E55.9 VITAMIN D DEFICIENCY: ICD-10-CM

## 2018-07-19 DIAGNOSIS — M54.50 CHRONIC BILATERAL LOW BACK PAIN WITHOUT SCIATICA: ICD-10-CM

## 2018-07-19 PROCEDURE — 99213 OFFICE O/P EST LOW 20 MIN: CPT | Performed by: FAMILY MEDICINE

## 2018-07-19 PROCEDURE — 98925 OSTEOPATH MANJ 1-2 REGIONS: CPT | Performed by: FAMILY MEDICINE

## 2018-07-19 RX ORDER — ZOLPIDEM TARTRATE 5 MG/1
5 TABLET ORAL DAILY
Qty: 30 TABLET | Refills: 0 | Status: SHIPPED | OUTPATIENT
Start: 2018-07-19 | End: 2018-09-06 | Stop reason: SDUPTHER

## 2018-07-19 RX ORDER — NAPROXEN 500 MG/1
500 TABLET ORAL 2 TIMES DAILY WITH MEALS
Qty: 60 TABLET | Refills: 0 | Status: SHIPPED | OUTPATIENT
Start: 2018-07-19 | End: 2018-09-21 | Stop reason: SDUPTHER

## 2018-07-19 RX ORDER — TRAMADOL HYDROCHLORIDE 50 MG/1
50 TABLET ORAL EVERY 6 HOURS
Qty: 30 TABLET | Refills: 0 | Status: SHIPPED | OUTPATIENT
Start: 2018-07-19 | End: 2018-07-20 | Stop reason: SDUPTHER

## 2018-07-19 RX ORDER — FERROUS SULFATE 325(65) MG
325 TABLET ORAL
Qty: 90 TABLET | Refills: 0 | Status: SHIPPED | OUTPATIENT
Start: 2018-07-19 | End: 2020-01-09 | Stop reason: ALTCHOICE

## 2018-07-20 RX ORDER — TRAMADOL HYDROCHLORIDE 50 MG/1
50 TABLET ORAL EVERY 6 HOURS
Qty: 30 TABLET | Refills: 0 | Status: SHIPPED | OUTPATIENT
Start: 2018-07-20 | End: 2018-08-13 | Stop reason: SDUPTHER

## 2018-07-20 RX ORDER — OXYCODONE HYDROCHLORIDE AND ACETAMINOPHEN 5; 325 MG/1; MG/1
1 TABLET ORAL EVERY 4 HOURS PRN
Qty: 120 TABLET | Refills: 0 | Status: SHIPPED | OUTPATIENT
Start: 2018-07-20 | End: 2018-08-30 | Stop reason: SDUPTHER

## 2018-07-31 NOTE — PROGRESS NOTES
Assessment/Plan:    No problem-specific Assessment & Plan notes found for this encounter  Diagnoses and all orders for this visit:    Iron deficiency anemia secondary to inadequate dietary iron intake  -     ferrous sulfate 325 (65 Fe) mg tablet; Take 1 tablet (325 mg total) by mouth daily with breakfast  -     CBC and differential; Future  -     Lipid panel; Future    Primary insomnia  -     zolpidem (AMBIEN) 5 mg tablet; Take 1 tablet (5 mg total) by mouth daily    Cervicalgia  -     Discontinue: traMADol (ULTRAM) 50 mg tablet; Take 1 tablet (50 mg total) by mouth every 6 (six) hours  -     traMADol (ULTRAM) 50 mg tablet; Take 1 tablet (50 mg total) by mouth every 6 (six) hours    Chronic bilateral low back pain without sciatica  -     naproxen (NAPROSYN) 500 mg tablet; Take 1 tablet (500 mg total) by mouth 2 (two) times a day with meals  -     oxyCODONE-acetaminophen (ENDOCET) 5-325 mg per tablet; Take 1 tablet by mouth every 4 (four) hours as needed for moderate pain Max Daily Amount: 6 tablets    Acne vulgaris    Vitamin D deficiency  -     cholecalciferol (VITAMIN D3) 72760 units capsule; Take 1 capsule (10,000 Units total) by mouth daily  -     Comprehensive metabolic panel; Future  -     Vitamin D 25 hydroxy; Future          Subjective:      Patient ID: Funmilayo Willard is a 41 y o  female  Patient here for chronic thoracic pain  She also has anemia and vit d def          The following portions of the patient's history were reviewed and updated as appropriate: current medications, past family history, past medical history, past social history, past surgical history and problem list     Review of Systems   HENT: Negative  Eyes: Negative  Respiratory: Negative  Cardiovascular: Negative            Objective:      /84   Pulse 97   Resp 18   Wt 59 kg (130 lb)   SpO2 100%   BMI 23 78 kg/m²          Physical Exam   Musculoskeletal:   t3-5 group curve RrSl given massage and HVLA

## 2018-08-10 ENCOUNTER — TELEPHONE (OUTPATIENT)
Dept: FAMILY MEDICINE CLINIC | Facility: CLINIC | Age: 38
End: 2018-08-10

## 2018-08-10 DIAGNOSIS — M54.2 CERVICALGIA: ICD-10-CM

## 2018-08-10 NOTE — TELEPHONE ENCOUNTER
Pt needs refill on tramdol pt needs more than 7 days supply if possible please send to [facility] in [place]

## 2018-08-13 RX ORDER — TRAMADOL HYDROCHLORIDE 50 MG/1
50 TABLET ORAL EVERY 6 HOURS
Qty: 30 TABLET | Refills: 0 | Status: SHIPPED | OUTPATIENT
Start: 2018-08-13 | End: 2018-09-07 | Stop reason: SDUPTHER

## 2018-08-30 ENCOUNTER — OFFICE VISIT (OUTPATIENT)
Dept: FAMILY MEDICINE CLINIC | Facility: CLINIC | Age: 38
End: 2018-08-30
Payer: COMMERCIAL

## 2018-08-30 VITALS
RESPIRATION RATE: 18 BRPM | WEIGHT: 130 LBS | BODY MASS INDEX: 23.78 KG/M2 | DIASTOLIC BLOOD PRESSURE: 74 MMHG | HEART RATE: 88 BPM | SYSTOLIC BLOOD PRESSURE: 110 MMHG

## 2018-08-30 DIAGNOSIS — M54.50 CHRONIC BILATERAL LOW BACK PAIN WITHOUT SCIATICA: ICD-10-CM

## 2018-08-30 DIAGNOSIS — G89.29 CHRONIC BILATERAL LOW BACK PAIN WITHOUT SCIATICA: ICD-10-CM

## 2018-08-30 DIAGNOSIS — L70.0 ACNE VULGARIS: ICD-10-CM

## 2018-08-30 DIAGNOSIS — M54.6 CHRONIC BILATERAL THORACIC BACK PAIN: ICD-10-CM

## 2018-08-30 DIAGNOSIS — R30.9 PAINFUL URINATION: ICD-10-CM

## 2018-08-30 DIAGNOSIS — N30.00 ACUTE CYSTITIS WITHOUT HEMATURIA: Primary | ICD-10-CM

## 2018-08-30 DIAGNOSIS — G89.29 CHRONIC BILATERAL THORACIC BACK PAIN: ICD-10-CM

## 2018-08-30 LAB
SL AMB  POCT GLUCOSE, UA: ABNORMAL
SL AMB LEUKOCYTE ESTERASE,UA: 75
SL AMB POCT BILIRUBIN,UA: ABNORMAL
SL AMB POCT BLOOD,UA: 50
SL AMB POCT CLARITY,UA: ABNORMAL
SL AMB POCT COLOR,UA: YELLOW
SL AMB POCT KETONES,UA: ABNORMAL
SL AMB POCT NITRITE,UA: ABNORMAL
SL AMB POCT PH,UA: 5
SL AMB POCT SPECIFIC GRAVITY,UA: 1.03
SL AMB POCT URINE PROTEIN: ABNORMAL
SL AMB POCT UROBILINOGEN: ABNORMAL

## 2018-08-30 PROCEDURE — 81003 URINALYSIS AUTO W/O SCOPE: CPT | Performed by: FAMILY MEDICINE

## 2018-08-30 PROCEDURE — 99213 OFFICE O/P EST LOW 20 MIN: CPT | Performed by: FAMILY MEDICINE

## 2018-08-30 RX ORDER — CIPROFLOXACIN 500 MG/1
500 TABLET, FILM COATED ORAL EVERY 12 HOURS SCHEDULED
Qty: 6 TABLET | Refills: 0 | Status: SHIPPED | OUTPATIENT
Start: 2018-08-30 | End: 2018-09-02

## 2018-08-30 RX ORDER — DAPSONE 50 MG/G
GEL TOPICAL
Qty: 90 G | Refills: 0 | Status: SHIPPED | OUTPATIENT
Start: 2018-08-30 | End: 2019-03-07 | Stop reason: SDUPTHER

## 2018-08-30 RX ORDER — OXYCODONE HYDROCHLORIDE AND ACETAMINOPHEN 5; 325 MG/1; MG/1
1 TABLET ORAL EVERY 4 HOURS PRN
Qty: 120 TABLET | Refills: 0 | Status: SHIPPED | OUTPATIENT
Start: 2018-08-30 | End: 2018-09-21 | Stop reason: SDUPTHER

## 2018-09-06 ENCOUNTER — TELEPHONE (OUTPATIENT)
Dept: FAMILY MEDICINE CLINIC | Facility: CLINIC | Age: 38
End: 2018-09-06

## 2018-09-06 DIAGNOSIS — F51.01 PRIMARY INSOMNIA: ICD-10-CM

## 2018-09-06 RX ORDER — ZOLPIDEM TARTRATE 5 MG/1
5 TABLET ORAL DAILY
Qty: 30 TABLET | Refills: 0 | Status: SHIPPED | OUTPATIENT
Start: 2018-09-06 | End: 2018-10-12 | Stop reason: SDUPTHER

## 2018-09-06 NOTE — TELEPHONE ENCOUNTER
DR Dinah Thomas - PT WAS TAKING CIPRO FOR UTI  SHE ONLY HAD 3 DAYS WORTH  SHE IS STILL HAVING SYMPTOMS AND WANTS TO KNOW IF SHE CAN HAVE ANOTHER SCRIPT  SHE WOULD LIKE TO SPEAK TO YOU

## 2018-09-07 DIAGNOSIS — N30.00 ACUTE CYSTITIS WITHOUT HEMATURIA: Primary | ICD-10-CM

## 2018-09-07 DIAGNOSIS — M54.2 CERVICALGIA: ICD-10-CM

## 2018-09-07 RX ORDER — TRAMADOL HYDROCHLORIDE 50 MG/1
50 TABLET ORAL EVERY 6 HOURS
Qty: 90 TABLET | Refills: 0 | Status: SHIPPED | OUTPATIENT
Start: 2018-09-07 | End: 2018-10-01 | Stop reason: SDUPTHER

## 2018-09-07 RX ORDER — TRAMADOL HYDROCHLORIDE 50 MG/1
50 TABLET ORAL EVERY 6 HOURS
Qty: 30 TABLET | Refills: 0 | Status: CANCELLED | OUTPATIENT
Start: 2018-09-07

## 2018-09-07 RX ORDER — CIPROFLOXACIN 500 MG/1
500 TABLET, FILM COATED ORAL EVERY 12 HOURS SCHEDULED
Qty: 14 TABLET | Refills: 0 | Status: SHIPPED | OUTPATIENT
Start: 2018-09-07 | End: 2018-09-14

## 2018-09-07 NOTE — TELEPHONE ENCOUNTER
DR Nixon Do - PT NEEDS THIS REFILL TODAY  [facility] AND WANTS THIS TO BE DONE TODAY  SHE WANTS A CALL WHEN THIS IS DONE  SHE WOULD LIKE A 2 WEEK OR 30 DAY SUPPLY  ALSO SEE MESSAGE DATED YESTERDAY ABOUT THE ANTI-BIOTIC FOR UTI

## 2018-09-20 ENCOUNTER — OFFICE VISIT (OUTPATIENT)
Dept: FAMILY MEDICINE CLINIC | Facility: CLINIC | Age: 38
End: 2018-09-20
Payer: COMMERCIAL

## 2018-09-20 VITALS
BODY MASS INDEX: 23.41 KG/M2 | HEART RATE: 90 BPM | OXYGEN SATURATION: 100 % | SYSTOLIC BLOOD PRESSURE: 116 MMHG | WEIGHT: 128 LBS | RESPIRATION RATE: 16 BRPM | DIASTOLIC BLOOD PRESSURE: 80 MMHG

## 2018-09-20 DIAGNOSIS — M54.50 CHRONIC BILATERAL LOW BACK PAIN WITHOUT SCIATICA: ICD-10-CM

## 2018-09-20 DIAGNOSIS — M54.6 CHRONIC BILATERAL THORACIC BACK PAIN: Primary | ICD-10-CM

## 2018-09-20 DIAGNOSIS — G89.29 CHRONIC BILATERAL LOW BACK PAIN WITHOUT SCIATICA: ICD-10-CM

## 2018-09-20 DIAGNOSIS — M54.2 CERVICALGIA: ICD-10-CM

## 2018-09-20 DIAGNOSIS — G89.29 CHRONIC BILATERAL THORACIC BACK PAIN: Primary | ICD-10-CM

## 2018-09-20 PROCEDURE — 99213 OFFICE O/P EST LOW 20 MIN: CPT | Performed by: FAMILY MEDICINE

## 2018-09-21 RX ORDER — NAPROXEN 500 MG/1
500 TABLET ORAL 2 TIMES DAILY WITH MEALS
Qty: 60 TABLET | Refills: 0 | Status: SHIPPED | OUTPATIENT
Start: 2018-09-21 | End: 2019-05-17 | Stop reason: SDUPTHER

## 2018-09-21 RX ORDER — OXYCODONE HYDROCHLORIDE AND ACETAMINOPHEN 5; 325 MG/1; MG/1
1 TABLET ORAL EVERY 4 HOURS PRN
Qty: 120 TABLET | Refills: 0 | Status: SHIPPED | OUTPATIENT
Start: 2018-09-21 | End: 2018-10-12 | Stop reason: SDUPTHER

## 2018-09-26 ENCOUNTER — APPOINTMENT (OUTPATIENT)
Dept: LAB | Facility: HOSPITAL | Age: 38
End: 2018-09-26
Attending: FAMILY MEDICINE
Payer: COMMERCIAL

## 2018-09-26 ENCOUNTER — TRANSCRIBE ORDERS (OUTPATIENT)
Dept: ADMINISTRATIVE | Facility: HOSPITAL | Age: 38
End: 2018-09-26

## 2018-09-26 DIAGNOSIS — E55.9 VITAMIN D DEFICIENCY: ICD-10-CM

## 2018-09-26 DIAGNOSIS — D50.8 IRON DEFICIENCY ANEMIA SECONDARY TO INADEQUATE DIETARY IRON INTAKE: ICD-10-CM

## 2018-09-26 LAB
25(OH)D3 SERPL-MCNC: 26.6 NG/ML (ref 30–100)
ALBUMIN SERPL BCP-MCNC: 3.5 G/DL (ref 3.5–5)
ALP SERPL-CCNC: 50 U/L (ref 46–116)
ALT SERPL W P-5'-P-CCNC: 22 U/L (ref 12–78)
ANION GAP SERPL CALCULATED.3IONS-SCNC: 9 MMOL/L (ref 4–13)
AST SERPL W P-5'-P-CCNC: 18 U/L (ref 5–45)
BASOPHILS # BLD AUTO: 0.03 THOUSANDS/ΜL (ref 0–0.1)
BASOPHILS NFR BLD AUTO: 1 % (ref 0–1)
BILIRUB SERPL-MCNC: 0.3 MG/DL (ref 0.2–1)
BUN SERPL-MCNC: 15 MG/DL (ref 5–25)
CALCIUM SERPL-MCNC: 8.4 MG/DL (ref 8.3–10.1)
CHLORIDE SERPL-SCNC: 103 MMOL/L (ref 100–108)
CHOLEST SERPL-MCNC: 157 MG/DL (ref 50–200)
CO2 SERPL-SCNC: 22 MMOL/L (ref 21–32)
CREAT SERPL-MCNC: 0.66 MG/DL (ref 0.6–1.3)
EOSINOPHIL # BLD AUTO: 0.2 THOUSAND/ΜL (ref 0–0.61)
EOSINOPHIL NFR BLD AUTO: 4 % (ref 0–6)
ERYTHROCYTE [DISTWIDTH] IN BLOOD BY AUTOMATED COUNT: 15.1 % (ref 11.6–15.1)
GFR SERPL CREATININE-BSD FRML MDRD: 114 ML/MIN/1.73SQ M
GLUCOSE P FAST SERPL-MCNC: 90 MG/DL (ref 65–99)
HCT VFR BLD AUTO: 34 % (ref 34.8–46.1)
HDLC SERPL-MCNC: 55 MG/DL (ref 40–60)
HGB BLD-MCNC: 10.6 G/DL (ref 11.5–15.4)
IMM GRANULOCYTES # BLD AUTO: 0.01 THOUSAND/UL (ref 0–0.2)
IMM GRANULOCYTES NFR BLD AUTO: 0 % (ref 0–2)
LDLC SERPL CALC-MCNC: 88 MG/DL (ref 0–100)
LYMPHOCYTES # BLD AUTO: 1.56 THOUSANDS/ΜL (ref 0.6–4.47)
LYMPHOCYTES NFR BLD AUTO: 32 % (ref 14–44)
MCH RBC QN AUTO: 27.7 PG (ref 26.8–34.3)
MCHC RBC AUTO-ENTMCNC: 31.2 G/DL (ref 31.4–37.4)
MCV RBC AUTO: 89 FL (ref 82–98)
MONOCYTES # BLD AUTO: 0.58 THOUSAND/ΜL (ref 0.17–1.22)
MONOCYTES NFR BLD AUTO: 12 % (ref 4–12)
NEUTROPHILS # BLD AUTO: 2.55 THOUSANDS/ΜL (ref 1.85–7.62)
NEUTS SEG NFR BLD AUTO: 51 % (ref 43–75)
NONHDLC SERPL-MCNC: 102 MG/DL
NRBC BLD AUTO-RTO: 0 /100 WBCS
PLATELET # BLD AUTO: 332 THOUSANDS/UL (ref 149–390)
PMV BLD AUTO: 9.2 FL (ref 8.9–12.7)
POTASSIUM SERPL-SCNC: 3.8 MMOL/L (ref 3.5–5.3)
PROT SERPL-MCNC: 6.5 G/DL (ref 6.4–8.2)
RBC # BLD AUTO: 3.82 MILLION/UL (ref 3.81–5.12)
SODIUM SERPL-SCNC: 134 MMOL/L (ref 136–145)
TRIGL SERPL-MCNC: 72 MG/DL
WBC # BLD AUTO: 4.93 THOUSAND/UL (ref 4.31–10.16)

## 2018-09-26 PROCEDURE — 82306 VITAMIN D 25 HYDROXY: CPT

## 2018-09-26 PROCEDURE — 80053 COMPREHEN METABOLIC PANEL: CPT

## 2018-09-26 PROCEDURE — 36415 COLL VENOUS BLD VENIPUNCTURE: CPT

## 2018-09-26 PROCEDURE — 85025 COMPLETE CBC W/AUTO DIFF WBC: CPT

## 2018-09-26 PROCEDURE — 80061 LIPID PANEL: CPT

## 2018-09-27 ENCOUNTER — OFFICE VISIT (OUTPATIENT)
Dept: FAMILY MEDICINE CLINIC | Facility: CLINIC | Age: 38
End: 2018-09-27
Payer: COMMERCIAL

## 2018-09-27 VITALS
DIASTOLIC BLOOD PRESSURE: 64 MMHG | HEART RATE: 74 BPM | WEIGHT: 131 LBS | OXYGEN SATURATION: 100 % | TEMPERATURE: 98.2 F | BODY MASS INDEX: 23.96 KG/M2 | RESPIRATION RATE: 16 BRPM | SYSTOLIC BLOOD PRESSURE: 100 MMHG

## 2018-09-27 DIAGNOSIS — Z00.00 PHYSICAL EXAM: ICD-10-CM

## 2018-09-27 DIAGNOSIS — E55.9 VITAMIN D DEFICIENCY: Primary | ICD-10-CM

## 2018-09-27 PROCEDURE — 99213 OFFICE O/P EST LOW 20 MIN: CPT | Performed by: FAMILY MEDICINE

## 2018-09-27 RX ORDER — ERGOCALCIFEROL 1.25 MG/1
50000 CAPSULE ORAL WEEKLY
Qty: 8 CAPSULE | Refills: 0 | Status: SHIPPED | OUTPATIENT
Start: 2018-09-27 | End: 2019-03-07 | Stop reason: SDUPTHER

## 2018-09-27 NOTE — PROGRESS NOTES
Assessment/Plan:    No problem-specific Assessment & Plan notes found for this encounter  Diagnoses and all orders for this visit:    Acute cystitis without hematuria  -     ciprofloxacin (CIPRO) 500 mg tablet; Take 1 tablet (500 mg total) by mouth every 12 (twelve) hours for 3 days    Acne vulgaris  -     Dapsone (ACZONE) 5 % topical gel; Bid    Chronic bilateral thoracic back pain    Chronic bilateral low back pain without sciatica  -     Discontinue: oxyCODONE-acetaminophen (ENDOCET) 5-325 mg per tablet; Take 1 tablet by mouth every 4 (four) hours as needed for moderate pain Max Daily Amount: 6 tablets    Painful urination  -     POCT urine dip auto non-scope  -     Urine culture          Subjective:      Patient ID: Aram Shanks is a 41 y o  female  Patient is here for her routine OMT visit for chronic thoracic and neck pain       patient also complains of several days increased urinary frequency decreased emptying and burning with urination no 8 of  The following portions of the patient's history were reviewed and updated as appropriate: current medications, past family history, past medical history, past social history, past surgical history and problem list     Review of Systems   Musculoskeletal: Positive for back pain and neck pain           Objective:      /74   Pulse 88   Resp 18   Wt 59 kg (130 lb)   BMI 23 78 kg/m²          Physical Exam   Musculoskeletal:    She does have scoliosis noted previously noted thoracic spine so there is a side bending and rotation noted there HVLA was applied and muscle energy and massage was applied her cervical spine region

## 2018-09-28 NOTE — PROGRESS NOTES
Assessment/Plan:    No problem-specific Assessment & Plan notes found for this encounter  Diagnoses and all orders for this visit:    Vitamin D deficiency  -     ergocalciferol (VITAMIN D2) 50,000 units; Take 1 capsule (50,000 Units total) by mouth once a week    Physical exam          Subjective:      Patient ID: Ortega Chester is a 41 y o  female  70-year-old female here for a complete physical she gets her gyn care elsewhere she only has 2 concerns today she has some dry patches on her feet and was just wondering what she might do to normalize the skin and also during the summer during the very humid  We had she was drinking a lot of 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 a lot of fluids and was overdoing it on the coffee and had some episodes of palpitations she has had none since the weather has normalized over the past 3-4 weeks        The following portions of the patient's history were reviewed and updated as appropriate: current medications, past family history, past medical history, past social history, past surgical history and problem list     Review of Systems      Objective:      /64   Pulse 74   Temp 98 2 °F (36 8 °C)   Resp 16   Wt 59 4 kg (131 lb)   SpO2 100%   BMI 23 96 kg/m²          Physical Exam   Constitutional: She is oriented to person, place, and time  She appears well-developed  No distress  HENT:   Head: Normocephalic  Right Ear: External ear normal    Left Ear: External ear normal    Mouth/Throat: Oropharynx is clear and moist    Eyes: Pupils are equal, round, and reactive to light  Conjunctivae are normal    Neck: Normal range of motion  No tracheal deviation present  No thyromegaly present  Cardiovascular: Normal rate, regular rhythm and normal heart sounds  Exam reveals no gallop and no friction rub  No murmur heard  Pulmonary/Chest: Effort normal  No respiratory distress  She has wheezes  She has no rales   She exhibits no tenderness  Abdominal: Soft  Bowel sounds are normal  She exhibits no distension and no mass  There is no tenderness  There is no rebound and no guarding  Musculoskeletal: Normal range of motion  Patient is chronic paracervical muscle spasm bilaterally she has a T3 and T4 rotation to the left and side bent to the right   good result with OMT   Neurological: She is alert and oriented to person, place, and time  Skin: Skin is warm and dry  She is not diaphoretic  Psychiatric: She has a normal mood and affect   Her behavior is normal  Judgment and thought content normal      massage and muscle energy used in the neck portion as well as the thoracic region

## 2018-10-01 DIAGNOSIS — M54.2 CERVICALGIA: ICD-10-CM

## 2018-10-01 RX ORDER — TRAMADOL HYDROCHLORIDE 50 MG/1
50 TABLET ORAL EVERY 6 HOURS
Qty: 90 TABLET | Refills: 0 | Status: SHIPPED | OUTPATIENT
Start: 2018-10-01 | End: 2018-10-25 | Stop reason: SDUPTHER

## 2018-10-02 NOTE — PROGRESS NOTES
Assessment/Plan:    No problem-specific Assessment & Plan notes found for this encounter  Diagnoses and all orders for this visit:    Chronic bilateral low back pain without sciatica  -     naproxen (NAPROSYN) 500 mg tablet; Take 1 tablet (500 mg total) by mouth 2 (two) times a day with meals  -     oxyCODONE-acetaminophen (ENDOCET) 5-325 mg per tablet; Take 1 tablet by mouth every 4 (four) hours as needed for moderate pain Max Daily Amount: 6 tablets          Subjective:      Patient ID: Javier Combs is a 41 y o  female  [facility] [place] is here for OMT   for chronic neck and thoracic back pain          The following portions of the patient's history were reviewed and updated as appropriate: current medications, past family history, past medical history, past social history, past surgical history and problem list     Review of Systems   Eyes: Negative  Respiratory: Negative  Cardiovascular: Negative  Objective:      /80   Pulse 90   Resp 16   Wt 58 1 kg (128 lb)   SpO2 100%   BMI 23 41 kg/m²          Physical Exam   Constitutional: She appears well-developed and well-nourished  Cardiovascular: Normal rate and regular rhythm      Pulmonary/Chest: Effort normal and breath sounds normal    Musculoskeletal:    Patient has a lot of cervical muscle spasm we do not do 1 T as far as HVLA on that area   the thoracic her due to her scoliosis with the rotation to the left and side bent to the right

## 2018-10-12 ENCOUNTER — OFFICE VISIT (OUTPATIENT)
Dept: FAMILY MEDICINE CLINIC | Facility: CLINIC | Age: 38
End: 2018-10-12
Payer: COMMERCIAL

## 2018-10-12 VITALS
HEART RATE: 108 BPM | DIASTOLIC BLOOD PRESSURE: 90 MMHG | TEMPERATURE: 97.5 F | BODY MASS INDEX: 23.58 KG/M2 | WEIGHT: 128.9 LBS | OXYGEN SATURATION: 98 % | SYSTOLIC BLOOD PRESSURE: 118 MMHG

## 2018-10-12 DIAGNOSIS — M54.6 CHRONIC BILATERAL THORACIC BACK PAIN: Primary | ICD-10-CM

## 2018-10-12 DIAGNOSIS — F51.01 PRIMARY INSOMNIA: ICD-10-CM

## 2018-10-12 DIAGNOSIS — G89.29 CHRONIC BILATERAL THORACIC BACK PAIN: Primary | ICD-10-CM

## 2018-10-12 PROCEDURE — 98925 OSTEOPATH MANJ 1-2 REGIONS: CPT | Performed by: FAMILY MEDICINE

## 2018-10-12 RX ORDER — ZOLPIDEM TARTRATE 5 MG/1
5 TABLET ORAL DAILY
Qty: 30 TABLET | Refills: 0 | Status: SHIPPED | OUTPATIENT
Start: 2018-10-12 | End: 2018-11-08 | Stop reason: SDUPTHER

## 2018-10-12 RX ORDER — OXYCODONE HYDROCHLORIDE AND ACETAMINOPHEN 5; 325 MG/1; MG/1
1 TABLET ORAL EVERY 4 HOURS PRN
Qty: 120 TABLET | Refills: 0 | Status: SHIPPED | OUTPATIENT
Start: 2018-10-12 | End: 2018-11-08 | Stop reason: SDUPTHER

## 2018-10-25 DIAGNOSIS — M54.2 CERVICALGIA: ICD-10-CM

## 2018-10-25 RX ORDER — TRAMADOL HYDROCHLORIDE 50 MG/1
50 TABLET ORAL EVERY 6 HOURS
Qty: 90 TABLET | Refills: 4 | Status: SHIPPED | OUTPATIENT
Start: 2018-10-25 | End: 2019-02-05 | Stop reason: SDUPTHER

## 2018-11-08 ENCOUNTER — OFFICE VISIT (OUTPATIENT)
Dept: FAMILY MEDICINE CLINIC | Facility: CLINIC | Age: 38
End: 2018-11-08
Payer: COMMERCIAL

## 2018-11-08 VITALS
WEIGHT: 128 LBS | RESPIRATION RATE: 18 BRPM | BODY MASS INDEX: 23.41 KG/M2 | TEMPERATURE: 98.6 F | HEART RATE: 102 BPM | OXYGEN SATURATION: 100 % | SYSTOLIC BLOOD PRESSURE: 110 MMHG | DIASTOLIC BLOOD PRESSURE: 58 MMHG

## 2018-11-08 DIAGNOSIS — I10 ESSENTIAL HYPERTENSION: Primary | ICD-10-CM

## 2018-11-08 DIAGNOSIS — F51.01 PRIMARY INSOMNIA: ICD-10-CM

## 2018-11-08 DIAGNOSIS — M54.50 CHRONIC BILATERAL LOW BACK PAIN WITHOUT SCIATICA: ICD-10-CM

## 2018-11-08 DIAGNOSIS — G89.29 CHRONIC BILATERAL LOW BACK PAIN WITHOUT SCIATICA: ICD-10-CM

## 2018-11-08 PROCEDURE — 98925 OSTEOPATH MANJ 1-2 REGIONS: CPT | Performed by: FAMILY MEDICINE

## 2018-11-08 RX ORDER — OXYCODONE HYDROCHLORIDE AND ACETAMINOPHEN 5; 325 MG/1; MG/1
1 TABLET ORAL EVERY 4 HOURS PRN
Qty: 120 TABLET | Refills: 0 | Status: SHIPPED | OUTPATIENT
Start: 2018-11-08 | End: 2018-11-28 | Stop reason: SDUPTHER

## 2018-11-08 RX ORDER — ZOLPIDEM TARTRATE 5 MG/1
5 TABLET ORAL DAILY
Qty: 30 TABLET | Refills: 0 | Status: SHIPPED | OUTPATIENT
Start: 2018-11-08 | End: 2018-12-12 | Stop reason: SDUPTHER

## 2018-11-08 RX ORDER — LISINOPRIL 10 MG/1
10 TABLET ORAL DAILY
Qty: 90 TABLET | Refills: 3 | Status: SHIPPED | OUTPATIENT
Start: 2018-11-08 | End: 2018-12-19 | Stop reason: SDUPTHER

## 2018-11-13 NOTE — PROGRESS NOTES
Assessment/Plan:    No problem-specific Assessment & Plan notes found for this encounter  Diagnoses and all orders for this visit:    Essential hypertension  -     lisinopril (ZESTRIL) 10 mg tablet; Take 1 tablet (10 mg total) by mouth daily    Primary insomnia  -     zolpidem (AMBIEN) 5 mg tablet; Take 1 tablet (5 mg total) by mouth daily    Chronic bilateral low back pain without sciatica  -     oxyCODONE-acetaminophen (ENDOCET) 5-325 mg per tablet; Take 1 tablet by mouth every 4 (four) hours as needed for moderate pain Max Daily Amount: 6 tablets          Subjective:      Patient ID: Svetlana Gerber is a 41 y o  female  Mely Mcgraw is here for her routine omt  She is feeling better today          The following portions of the patient's history were reviewed and updated as appropriate: current medications, past family history, past medical history, past social history, past surgical history and problem list     Review of Systems      Objective:      /58 (BP Location: Left arm, Patient Position: Sitting, Cuff Size: Standard)   Pulse 102   Temp 98 6 °F (37 °C) (Tympanic)   Resp 18   Wt 58 1 kg (128 lb)   LMP 10/31/2018 (Exact Date)   SpO2 100%   Breastfeeding?  No   BMI 23 41 kg/m²          Physical Exam Calm

## 2018-11-28 ENCOUNTER — OFFICE VISIT (OUTPATIENT)
Dept: FAMILY MEDICINE CLINIC | Facility: CLINIC | Age: 38
End: 2018-11-28
Payer: COMMERCIAL

## 2018-11-28 VITALS
SYSTOLIC BLOOD PRESSURE: 104 MMHG | WEIGHT: 129 LBS | DIASTOLIC BLOOD PRESSURE: 60 MMHG | RESPIRATION RATE: 16 BRPM | HEART RATE: 90 BPM | OXYGEN SATURATION: 100 % | BODY MASS INDEX: 23.59 KG/M2

## 2018-11-28 DIAGNOSIS — E55.9 VITAMIN D DEFICIENCY: ICD-10-CM

## 2018-11-28 DIAGNOSIS — G89.29 CHRONIC BILATERAL LOW BACK PAIN WITHOUT SCIATICA: ICD-10-CM

## 2018-11-28 DIAGNOSIS — D50.8 IRON DEFICIENCY ANEMIA SECONDARY TO INADEQUATE DIETARY IRON INTAKE: Primary | ICD-10-CM

## 2018-11-28 DIAGNOSIS — M54.50 CHRONIC BILATERAL LOW BACK PAIN WITHOUT SCIATICA: ICD-10-CM

## 2018-11-28 PROCEDURE — 98925 OSTEOPATH MANJ 1-2 REGIONS: CPT | Performed by: FAMILY MEDICINE

## 2018-11-28 RX ORDER — OXYCODONE HYDROCHLORIDE AND ACETAMINOPHEN 5; 325 MG/1; MG/1
1 TABLET ORAL EVERY 4 HOURS PRN
Qty: 120 TABLET | Refills: 0 | Status: SHIPPED | OUTPATIENT
Start: 2018-11-28 | End: 2018-12-19 | Stop reason: SDUPTHER

## 2018-12-12 DIAGNOSIS — F51.01 PRIMARY INSOMNIA: ICD-10-CM

## 2018-12-12 RX ORDER — ZOLPIDEM TARTRATE 5 MG/1
5 TABLET ORAL DAILY
Qty: 30 TABLET | Refills: 0 | Status: SHIPPED | OUTPATIENT
Start: 2018-12-12 | End: 2019-01-14 | Stop reason: SDUPTHER

## 2018-12-19 DIAGNOSIS — M54.50 CHRONIC BILATERAL LOW BACK PAIN WITHOUT SCIATICA: ICD-10-CM

## 2018-12-19 DIAGNOSIS — I10 ESSENTIAL HYPERTENSION: ICD-10-CM

## 2018-12-19 DIAGNOSIS — G89.29 CHRONIC BILATERAL LOW BACK PAIN WITHOUT SCIATICA: ICD-10-CM

## 2018-12-19 RX ORDER — LISINOPRIL 10 MG/1
10 TABLET ORAL DAILY
Qty: 90 TABLET | Refills: 0 | Status: SHIPPED | OUTPATIENT
Start: 2018-12-19 | End: 2019-02-05 | Stop reason: SDUPTHER

## 2018-12-19 RX ORDER — OXYCODONE HYDROCHLORIDE AND ACETAMINOPHEN 5; 325 MG/1; MG/1
1 TABLET ORAL EVERY 4 HOURS PRN
Qty: 120 TABLET | Refills: 0 | Status: SHIPPED | OUTPATIENT
Start: 2018-12-19 | End: 2019-01-07 | Stop reason: SDUPTHER

## 2018-12-28 ENCOUNTER — OFFICE VISIT (OUTPATIENT)
Dept: FAMILY MEDICINE CLINIC | Facility: CLINIC | Age: 38
End: 2018-12-28
Payer: COMMERCIAL

## 2018-12-28 VITALS
HEIGHT: 62 IN | SYSTOLIC BLOOD PRESSURE: 102 MMHG | HEART RATE: 91 BPM | OXYGEN SATURATION: 98 % | DIASTOLIC BLOOD PRESSURE: 70 MMHG | BODY MASS INDEX: 23.74 KG/M2 | TEMPERATURE: 98.8 F | WEIGHT: 129 LBS

## 2018-12-28 DIAGNOSIS — M54.2 CERVICALGIA: Primary | ICD-10-CM

## 2018-12-28 DIAGNOSIS — G89.29 CHRONIC BILATERAL THORACIC BACK PAIN: ICD-10-CM

## 2018-12-28 DIAGNOSIS — M54.6 CHRONIC BILATERAL THORACIC BACK PAIN: ICD-10-CM

## 2018-12-28 PROCEDURE — 98925 OSTEOPATH MANJ 1-2 REGIONS: CPT | Performed by: FAMILY MEDICINE

## 2018-12-28 RX ORDER — CYCLOBENZAPRINE HCL 10 MG
TABLET ORAL
COMMUNITY
Start: 2018-12-20 | End: 2019-03-23

## 2018-12-28 RX ORDER — DOXYCYCLINE 100 MG/1
CAPSULE ORAL
COMMUNITY
Start: 2018-12-20 | End: 2019-03-23

## 2019-01-03 ENCOUNTER — TELEPHONE (OUTPATIENT)
Dept: FAMILY MEDICINE CLINIC | Facility: CLINIC | Age: 39
End: 2019-01-03

## 2019-01-04 ENCOUNTER — NURSE TRIAGE (OUTPATIENT)
Dept: PHYSICAL THERAPY | Facility: OTHER | Age: 39
End: 2019-01-04

## 2019-01-04 ENCOUNTER — OFFICE VISIT (OUTPATIENT)
Dept: URGENT CARE | Facility: CLINIC | Age: 39
End: 2019-01-04
Payer: COMMERCIAL

## 2019-01-04 ENCOUNTER — APPOINTMENT (OUTPATIENT)
Dept: RADIOLOGY | Facility: CLINIC | Age: 39
End: 2019-01-04
Payer: COMMERCIAL

## 2019-01-04 VITALS
RESPIRATION RATE: 18 BRPM | SYSTOLIC BLOOD PRESSURE: 122 MMHG | HEIGHT: 63 IN | HEART RATE: 99 BPM | OXYGEN SATURATION: 99 % | DIASTOLIC BLOOD PRESSURE: 90 MMHG | TEMPERATURE: 97.7 F | BODY MASS INDEX: 23.21 KG/M2 | WEIGHT: 131 LBS

## 2019-01-04 DIAGNOSIS — M54.50 ACUTE MIDLINE LOW BACK PAIN WITHOUT SCIATICA: Primary | ICD-10-CM

## 2019-01-04 DIAGNOSIS — S30.0XXA CONTUSION OF SACROCOCCYGEAL REGION, INITIAL ENCOUNTER: Primary | ICD-10-CM

## 2019-01-04 DIAGNOSIS — M53.3 COCCYDYNIA: ICD-10-CM

## 2019-01-04 PROCEDURE — 99214 OFFICE O/P EST MOD 30 MIN: CPT | Performed by: PHYSICIAN ASSISTANT

## 2019-01-04 PROCEDURE — 72220 X-RAY EXAM SACRUM TAILBONE: CPT

## 2019-01-04 NOTE — TELEPHONE ENCOUNTER
Additional Information   Negative: Has the patient experienced major trauma? (fall from height, high speed collision, direct blow to spine) and is also experiencing nausea, light-headedness, or loss of consciousness? [place] on her buttocks/sacral area roller skating  Denies loss of consciousness, denies nausea/vomiting, denies bruising, bleeding or swelling  Background - Initial Assessment  Clinical complaint: Sacral/coccyx low back pain  Date of onset: 01/01/19  Mechanism of injury: [place] on her sacral area roller skating    Previous Treatment - Previous Treatment  Previous evaluation: Was awaiting PCP appt on 1/4/18 - appt canceled  Current provider: Dr Rivka Spence: No recent imaging   Previous treatment: naproxen for herniated disc in neck, tramadol, endocet RX    Protocols used: [facility] COMPREHENSIVE SPINE PROGRAM PROTOCOL    RN provided patient an overview of the Comprehensive Spine Program including: triage assessment, physical therapy, and additional specialist referral options based on symptoms and chronicity  RN explained that Comprehensive Spine program is physical therapy based with the goal of getting the patient scheduled in 24 - 48 hrs  Further explained that we schedule patients for a consultation with one of our advanced spine physical therapists for evaluation which may include treatment  These therapists have their doctorate in PTx  If needed, a telemed visit could occur at the same time of this consultation if muscle relaxers or a higher dose NSAID is needed  The goal is to get patients treated as quickly as possible so they can return to their normal activities  Research has shown great results when starting physical therapy sooner than later which helps reduce imaging and costs to patients  Patient reports herniated disc in neck and scoliosis  Patient concerned that even with pain medication pain is still severe and increasing   Patient advised to go to urgent care or ED for evaluation if pain is that severe and affecting her mobility  Patient would also like to set up appointment with PT for after urgent care evaluation, and will then go to urgent care after triage assessment  Patient verbalized understanding of advice and states she will proceed to [address] [facility] for evaluation after ending call  Patient states she had transportation available to her to get to urgent care

## 2019-01-05 NOTE — PATIENT INSTRUCTIONS
Continue pain medications as previously directed  Apply ice or heat for 20-30 minutes, as needed for discomfort  Massage the areas of discomfort and perform stretching exercises 1-2 times daily as reviewed  Sit on soft surfaces such as a inflatable donut or pillow  Follow up with pain management or orthopedics within 3 days  Proceed to the ER if symptoms worsen

## 2019-01-05 NOTE — PROGRESS NOTES
[facility]        NAME: Vincent Roger is a 41 y o  female  : 1980    MRN: [id number]  DATE: 2019  TIME: 8:03 PM    Assessment and Plan   Contusion of sacrococcygeal region, initial encounter [S30  0XXA]  1  Contusion of sacrococcygeal region, initial encounter  XR sacrum and coccyx     Patient Instructions   Continue pain medications as previously directed  Apply ice or heat for 20-30 minutes, as needed for discomfort  Massage the areas of discomfort and perform stretching exercises 1-2 times daily as reviewed  Sit on soft surfaces such as a inflatable donut or pillow  Follow up with pain management or orthopedics within 3 days  Proceed to the ER if symptoms worsen  Diagnosis, expected course of injury, and treatment plan were reviewed in length  All questions answered  Precautions given  Patient verbalized understanding and agreement with the treatment plan  Chief Complaint     Chief Complaint   Patient presents with    Fall     Pt fell and injured tailbone skating 3 days ago  Painful to sit and bend over  Applied ice/heat  History of Present Illness   53-year-old female presenting with complaint of tailbone pain x 3 days  Patient notes that while roller skating, she fell backwards landing onto her buttocks and then rolled to her back  She denies any head or neck injury but notes persistent discomfort that has been worsening over the past few days in the tailbone area  She notes she had an appointment to see her pain specialist today but states that was canceled  She notes pain is constant but worse with sitting and pressure application to the area  She notes that while sitting it feels like her spine is being pushed up into her body  She denies any previous problems with this area but does note chronic low back pain  She denies any exacerbation of chronic back discomfort with current injury    No numbness, tingling, weakness, saddle anesthesia as it was described to her, or incontinence of bowel or bladder  She notes she has attempted taking all of her chronic pain medications and has found little relief  She notes she took Flexeril which was reportedly left over from a dental problem but found little relief of this in the 1st few days and has thus not tried taking it again  Today she took naproxen at 1600 hours and Percocet sometime this afternoon  She notes her pain specialist recommended she be evaluated in this office if these medications are not relieving of discomfort  She has not yet tried any ice or heat application  Review of Systems   Review of Systems   Constitutional: Negative for chills, diaphoresis, fatigue and fever  Respiratory: Negative for shortness of breath and wheezing  Cardiovascular: Negative for chest pain and palpitations  Gastrointestinal: Negative for abdominal pain, diarrhea, nausea and vomiting  Musculoskeletal: Negative for arthralgias, myalgias, neck pain and neck stiffness  Skin: Negative for rash  Neurological: Negative for dizziness, weakness, light-headedness, numbness and headaches  Psychiatric/Behavioral: Negative for confusion and decreased concentration           Current Medications       Current Outpatient Prescriptions:     cholecalciferol (VITAMIN D3) 55112 units capsule, Take 1 capsule (10,000 Units total) by mouth daily, Disp: 90 capsule, Rfl: 0    Dapsone (ACZONE) 5 % topical gel, Bid, Disp: 90 g, Rfl: 0    doxycycline monohydrate (MONODOX) 100 mg capsule, , Disp: , Rfl:     ergocalciferol (VITAMIN D2) 50,000 units, Take 1 capsule (50,000 Units total) by mouth once a week, Disp: 8 capsule, Rfl: 0    ferrous sulfate 325 (65 Fe) mg tablet, Take 1 tablet (325 mg total) by mouth daily with breakfast, Disp: 90 tablet, Rfl: 0    lisinopril (ZESTRIL) 10 mg tablet, Take 1 tablet (10 mg total) by mouth daily, Disp: 90 tablet, Rfl: 0    naproxen (NAPROSYN) 500 mg tablet, Take 1 tablet (500 mg total) by mouth 2 (two) times a day with meals, Disp: 60 tablet, Rfl: 0    oxyCODONE-acetaminophen (ENDOCET) 5-325 mg per tablet, Take 1 tablet by mouth every 4 (four) hours as needed for moderate pain Max Daily Amount: 6 tablets, Disp: 120 tablet, Rfl: 0    PREVIDENT 5000 SENSITIVE 1 1-5 % PSTE, , Disp: , Rfl:     traMADol (ULTRAM) 50 mg tablet, Take 1 tablet (50 mg total) by mouth every 6 (six) hours, Disp: 90 tablet, Rfl: 4    cyclobenzaprine (FLEXERIL) 10 mg tablet, , Disp: , Rfl:     zolpidem (AMBIEN) 5 mg tablet, Take 1 tablet (5 mg total) by mouth daily (Patient not taking: Reported on 2019 ), Disp: 30 tablet, Rfl: 0    Current Allergies     Allergies as of 2019 - Reviewed 2019   Allergen Reaction Noted    Cephalexin  10/25/2017    Levofloxacin Diarrhea 10/25/2017    Methylprednisolone  2015    Sulfa antibiotics Hives 06/10/2016            The following portions of the patient's history were reviewed and updated as appropriate: allergies, current medications, past family history, past medical history, past social history, past surgical history and problem list      Past Medical History:   Diagnosis Date    Chronic fatigue     last assessed 16    Endometriosis     Hypoglycemia     Liver lesion     last assessed 10/2/15    Migraine     Ovarian cyst     Scoliosis     last assessed  10/21/13       Past Surgical History:   Procedure Laterality Date     SECTION      x2    LAPAROSCOPY      exploratory    TUBAL LIGATION         Family History   Problem Relation Age of Onset    Hypertension Mother     Skin cancer Mother     Other Mother         high cholesterol    Other Father         high cholesterol    Brain cancer Other     Lung cancer Other     Lung cancer Maternal Grandfather     Other Maternal Grandfather         oral cancer    Throat cancer Maternal Grandfather     Other Other         exposure to agent orange; tobacco use    Alcohol abuse Other     Lung cancer Other     Prostate cancer Other     Anxiety disorder Family     Diabetes Family     Heart disease Family     Cancer Family         lip, oral cavity, or pharynx         Medications have been verified  Objective   /90   Pulse 99   Temp 97 7 °F (36 5 °C)   Resp 18   Ht 5' 3" (1 6 m)   Wt 59 4 kg (131 lb)   LMP 12/27/2018   SpO2 99%   BMI 23 21 kg/m²     Saccrococyx XR: No acute osseous abn  Physical Exam     Physical Exam   Constitutional: She is oriented to person, place, and time  She appears well-developed and well-nourished  No distress  HENT:   Head: Normocephalic and atraumatic  Eyes: Conjunctivae are normal    Cardiovascular: Normal rate, regular rhythm and normal heart sounds  Pulmonary/Chest: Effort normal and breath sounds normal  No respiratory distress  She has no decreased breath sounds  She has no wheezes  She has no rhonchi  She has no rales  Musculoskeletal:        Cervical back: Normal  She exhibits no tenderness, no bony tenderness, no swelling, no edema, no deformity, no laceration, no pain and no spasm  Thoracic back: Normal  She exhibits no tenderness, no bony tenderness, no swelling, no edema, no deformity, no laceration, no pain and no spasm  Lumbar back: She exhibits no tenderness, no bony tenderness, no swelling, no edema, no deformity, no laceration, no pain and no spasm  Saccrum/coccyx:  No overlying skin abnormality/discloration, edema, or visible deformity  Bony TTP as well as bilateral gluteal TTP  No crepitus, palpable or visible deformity  Neurological: She is alert and oriented to person, place, and time  No cranial nerve deficit  She exhibits normal muscle tone  Coordination normal    Skin: Skin is warm and dry  No rash noted  She is not diaphoretic  Psychiatric: She has a normal mood and affect  Her behavior is normal    Nursing note and vitals reviewed

## 2019-01-07 DIAGNOSIS — G89.29 CHRONIC BILATERAL LOW BACK PAIN WITHOUT SCIATICA: ICD-10-CM

## 2019-01-07 DIAGNOSIS — M54.50 CHRONIC BILATERAL LOW BACK PAIN WITHOUT SCIATICA: ICD-10-CM

## 2019-01-08 ENCOUNTER — OFFICE VISIT (OUTPATIENT)
Dept: FAMILY MEDICINE CLINIC | Facility: CLINIC | Age: 39
End: 2019-01-08
Payer: COMMERCIAL

## 2019-01-08 VITALS
TEMPERATURE: 97 F | OXYGEN SATURATION: 97 % | SYSTOLIC BLOOD PRESSURE: 122 MMHG | DIASTOLIC BLOOD PRESSURE: 80 MMHG | HEART RATE: 127 BPM

## 2019-01-08 DIAGNOSIS — G89.29 CHRONIC BILATERAL THORACIC BACK PAIN: Primary | ICD-10-CM

## 2019-01-08 DIAGNOSIS — M54.6 CHRONIC BILATERAL THORACIC BACK PAIN: Primary | ICD-10-CM

## 2019-01-08 PROCEDURE — 98925 OSTEOPATH MANJ 1-2 REGIONS: CPT | Performed by: FAMILY MEDICINE

## 2019-01-08 RX ORDER — OXYCODONE HYDROCHLORIDE AND ACETAMINOPHEN 5; 325 MG/1; MG/1
1 TABLET ORAL EVERY 4 HOURS PRN
Qty: 120 TABLET | Refills: 0 | Status: SHIPPED | OUTPATIENT
Start: 2019-01-08 | End: 2019-01-28 | Stop reason: SDUPTHER

## 2019-01-14 DIAGNOSIS — F51.01 PRIMARY INSOMNIA: ICD-10-CM

## 2019-01-14 RX ORDER — ZOLPIDEM TARTRATE 5 MG/1
TABLET ORAL
Qty: 60 TABLET | Refills: 0 | Status: SHIPPED | OUTPATIENT
Start: 2019-01-14 | End: 2019-03-14 | Stop reason: SDUPTHER

## 2019-01-17 NOTE — PROGRESS NOTES
Assessment/Plan:    No problem-specific Assessment & Plan notes found for this encounter  Diagnoses and all orders for this visit:    Chronic bilateral thoracic back pain          Subjective:      Patient ID: Lorraine Zabala is a 41 y o  female      Jg Bailey is here for her regular OMT treatment her back has been feeling relatively okay still sore in the thoracic area region        The following portions of the patient's history were reviewed and updated as appropriate: current medications, past family history, past medical history, past social history, past surgical history and problem list     Review of Systems   Constitutional:        Please see chief complaint         Objective:      /80   Pulse (!) 127   Temp (!) 97 °F (36 1 °C)   LMP 12/27/2018   SpO2 97%          Physical Exam   Musculoskeletal:   There is significant paracervical muscle spasm just muscle energy and massage technique was done there as she does not want HVLA  HVLA was done in thorax area of T3 through T5 rotated right side bent left

## 2019-01-28 DIAGNOSIS — G89.29 CHRONIC BILATERAL LOW BACK PAIN WITHOUT SCIATICA: ICD-10-CM

## 2019-01-28 DIAGNOSIS — M54.50 CHRONIC BILATERAL LOW BACK PAIN WITHOUT SCIATICA: ICD-10-CM

## 2019-01-28 RX ORDER — OXYCODONE HYDROCHLORIDE AND ACETAMINOPHEN 5; 325 MG/1; MG/1
1 TABLET ORAL EVERY 4 HOURS PRN
Qty: 120 TABLET | Refills: 0 | Status: SHIPPED | OUTPATIENT
Start: 2019-01-28 | End: 2019-02-15 | Stop reason: SDUPTHER

## 2019-01-28 NOTE — TELEPHONE ENCOUNTER
DR Fish Sickle - PT IS IN THE SAME AMOUNT OF PAIN SINCE THE LAST TIME SHE SAW YOU  SHE WAS EXPECTING NOT AS MUCH PAIN  SHOULD SHE SCHEDULE APPT WITH ORTHO OR WAIT TILL SHE SEES YOU ON THURS

## 2019-01-29 NOTE — TELEPHONE ENCOUNTER
DR Randolph Better - CAN YOU PLEASE CLOSE THIS OUT ALL THE WAY  IT'S STILL ON THE IN BASKET AS CLOSE  THIS VISIT AND I AM UNABLE TO CLOSE IT OUT ALL THE WAY     Patricia Peck

## 2019-02-01 ENCOUNTER — PROCEDURE VISIT (OUTPATIENT)
Dept: FAMILY MEDICINE CLINIC | Facility: CLINIC | Age: 39
End: 2019-02-01
Payer: COMMERCIAL

## 2019-02-01 VITALS
DIASTOLIC BLOOD PRESSURE: 80 MMHG | SYSTOLIC BLOOD PRESSURE: 118 MMHG | HEART RATE: 95 BPM | OXYGEN SATURATION: 100 % | BODY MASS INDEX: 23.26 KG/M2 | WEIGHT: 131.3 LBS | TEMPERATURE: 97.5 F | RESPIRATION RATE: 18 BRPM

## 2019-02-01 DIAGNOSIS — M53.3 SACRAL PAIN: Primary | ICD-10-CM

## 2019-02-01 DIAGNOSIS — M54.6 CHRONIC BILATERAL THORACIC BACK PAIN: ICD-10-CM

## 2019-02-01 DIAGNOSIS — G89.29 CHRONIC BILATERAL THORACIC BACK PAIN: ICD-10-CM

## 2019-02-01 PROCEDURE — 98925 OSTEOPATH MANJ 1-2 REGIONS: CPT | Performed by: FAMILY MEDICINE

## 2019-02-01 NOTE — PROGRESS NOTES
Assessment/Plan:    No problem-specific Assessment & Plan notes found for this encounter  Diagnoses and all orders for this visit:    Cervicalgia    Chronic bilateral thoracic back pain    Other orders  -     cyclobenzaprine (FLEXERIL) 10 mg tablet;   -     doxycycline monohydrate (MONODOX) 100 mg capsule;   -     PREVIDENT 5000 SENSITIVE 1 1-5 % PSTE;           Subjective:      Patient ID: Henrique Nye is a 41 y o  female  Patient here for her routine OMT for chronic neck and thoracic pain          The following portions of the patient's history were reviewed and updated as appropriate: current medications, past family history, past medical history, past social history, past surgical history and problem list     Review of Systems      Objective:      /70   Pulse 91   Temp 98 8 °F (37 1 °C)   Ht 5' 2" (1 575 m)   Wt 58 5 kg (129 lb)   LMP 12/27/2018   SpO2 98%   BMI 23 59 kg/m²          Physical Exam   Constitutional: She appears well-developed and well-nourished     Musculoskeletal:   Decreased ROM cervical spine  Muscle energy done    t4-76 SrRl  HVLA done with good relief

## 2019-02-05 DIAGNOSIS — I10 ESSENTIAL HYPERTENSION: ICD-10-CM

## 2019-02-05 DIAGNOSIS — M54.2 CERVICALGIA: ICD-10-CM

## 2019-02-05 RX ORDER — TRAMADOL HYDROCHLORIDE 50 MG/1
50 TABLET ORAL EVERY 6 HOURS
Qty: 90 TABLET | Refills: 4 | Status: SHIPPED | OUTPATIENT
Start: 2019-02-05 | End: 2019-05-17 | Stop reason: SDUPTHER

## 2019-02-05 RX ORDER — LISINOPRIL 10 MG/1
10 TABLET ORAL DAILY
Qty: 90 TABLET | Refills: 0 | Status: SHIPPED | OUTPATIENT
Start: 2019-02-05 | End: 2019-03-26 | Stop reason: SDUPTHER

## 2019-02-15 DIAGNOSIS — G89.29 CHRONIC BILATERAL LOW BACK PAIN WITHOUT SCIATICA: ICD-10-CM

## 2019-02-15 DIAGNOSIS — M54.50 CHRONIC BILATERAL LOW BACK PAIN WITHOUT SCIATICA: ICD-10-CM

## 2019-02-15 RX ORDER — OXYCODONE HYDROCHLORIDE AND ACETAMINOPHEN 5; 325 MG/1; MG/1
1 TABLET ORAL EVERY 4 HOURS PRN
Qty: 120 TABLET | Refills: 0 | Status: CANCELLED | OUTPATIENT
Start: 2019-02-15

## 2019-02-15 RX ORDER — OXYCODONE HYDROCHLORIDE AND ACETAMINOPHEN 5; 325 MG/1; MG/1
1 TABLET ORAL EVERY 4 HOURS PRN
Qty: 120 TABLET | Refills: 0 | Status: SHIPPED | OUTPATIENT
Start: 2019-02-15 | End: 2019-03-07 | Stop reason: SDUPTHER

## 2019-02-18 ENCOUNTER — OFFICE VISIT (OUTPATIENT)
Dept: FAMILY MEDICINE CLINIC | Facility: CLINIC | Age: 39
End: 2019-02-18
Payer: COMMERCIAL

## 2019-02-18 VITALS
DIASTOLIC BLOOD PRESSURE: 70 MMHG | RESPIRATION RATE: 18 BRPM | HEART RATE: 90 BPM | SYSTOLIC BLOOD PRESSURE: 108 MMHG | OXYGEN SATURATION: 99 % | HEIGHT: 63 IN | BODY MASS INDEX: 23.67 KG/M2 | TEMPERATURE: 97.8 F | WEIGHT: 133.6 LBS

## 2019-02-18 DIAGNOSIS — G89.29 CHRONIC BILATERAL THORACIC BACK PAIN: ICD-10-CM

## 2019-02-18 DIAGNOSIS — M54.6 CHRONIC BILATERAL THORACIC BACK PAIN: ICD-10-CM

## 2019-02-18 DIAGNOSIS — M54.2 CERVICALGIA: Primary | ICD-10-CM

## 2019-02-18 PROCEDURE — 98925 OSTEOPATH MANJ 1-2 REGIONS: CPT | Performed by: FAMILY MEDICINE

## 2019-02-18 NOTE — PROGRESS NOTES
Assessment/Plan:    No problem-specific Assessment & Plan notes found for this encounter  Diagnoses and all orders for this visit:    Sacral pain  -     Ambulatory referral to Physical Therapy; Future          Subjective:      Patient ID: Blaze Osborn is a 41 y o  female  Patient is here for routine OMT visit  No new complaints today      The following portions of the patient's history were reviewed and updated as appropriate: current medications, past family history, past medical history, past social history, past surgical history and problem list     Review of Systems   Constitutional: Negative  Eyes: Negative  Respiratory: Negative  Cardiovascular: Negative  Objective:      /80 (BP Location: Left arm, Patient Position: Sitting, Cuff Size: Adult)   Pulse 95   Temp 97 5 °F (36 4 °C) (Tympanic)   Resp 18   Wt 59 6 kg (131 lb 4 8 oz)   SpO2 100%   BMI 23 26 kg/m²          Physical Exam   Constitutional: She appears well-developed and well-nourished  Cardiovascular: Normal rate and regular rhythm     Pulmonary/Chest: Effort normal and breath sounds normal    Musculoskeletal:   There is a scoliosis known in her thoracic spine  t45 rr sl         Cervical spine C3 rotated right side bent left only muscle energy done there HVLA done of thoracic spine

## 2019-02-20 NOTE — PROGRESS NOTES
Assessment/Plan:    No problem-specific Assessment & Plan notes found for this encounter  Diagnoses and all orders for this visit:    Cervicalgia    Chronic bilateral thoracic back pain          Subjective:      Patient ID: Dariela Hidalgo is a 41 y o  female  Patient is here for her routine OMT visit overall she is better but she still has some pain in between her scapulas      The following portions of the patient's history were reviewed and updated as appropriate: current medications, past family history, past medical history, past social history, past surgical history and problem list     Review of Systems   Constitutional:        As per cc         Objective:      /70 (BP Location: Left arm, Patient Position: Sitting, Cuff Size: Adult)   Pulse 90   Temp 97 8 °F (36 6 °C) (Tympanic)   Resp 18   Ht 5' 3" (1 6 m)   Wt 60 6 kg (133 lb 9 6 oz)   SpO2 99%   BMI 23 67 kg/m²          Physical Exam   Constitutional: She appears well-developed and well-nourished     Musculoskeletal:   T2 through T4 is side bent right rotated left which is partial of her normal scoliotic curve  HVLA done with good relief in massage techniques done to her cervical spine

## 2019-03-07 DIAGNOSIS — E55.9 VITAMIN D DEFICIENCY: ICD-10-CM

## 2019-03-07 DIAGNOSIS — G89.29 CHRONIC BILATERAL LOW BACK PAIN WITHOUT SCIATICA: ICD-10-CM

## 2019-03-07 DIAGNOSIS — L70.0 ACNE VULGARIS: ICD-10-CM

## 2019-03-07 DIAGNOSIS — M54.50 CHRONIC BILATERAL LOW BACK PAIN WITHOUT SCIATICA: ICD-10-CM

## 2019-03-07 RX ORDER — DAPSONE 50 MG/G
GEL TOPICAL
Qty: 90 G | Refills: 0 | Status: SHIPPED | OUTPATIENT
Start: 2019-03-07 | End: 2019-04-23 | Stop reason: SDUPTHER

## 2019-03-07 RX ORDER — ERGOCALCIFEROL 1.25 MG/1
50000 CAPSULE ORAL WEEKLY
Qty: 8 CAPSULE | Refills: 0 | Status: SHIPPED | OUTPATIENT
Start: 2019-03-07 | End: 2019-03-23

## 2019-03-07 RX ORDER — OXYCODONE HYDROCHLORIDE AND ACETAMINOPHEN 5; 325 MG/1; MG/1
1 TABLET ORAL EVERY 4 HOURS PRN
Qty: 120 TABLET | Refills: 0 | Status: SHIPPED | OUTPATIENT
Start: 2019-03-07 | End: 2019-03-29 | Stop reason: SDUPTHER

## 2019-03-07 NOTE — PROGRESS NOTES
Assessment/Plan:    No problem-specific Assessment & Plan notes found for this encounter  Diagnoses and all orders for this visit:    Iron deficiency anemia secondary to inadequate dietary iron intake  -     Ferritin; Future    Vitamin D deficiency  -     Vitamin D 25 hydroxy; Future    Chronic bilateral low back pain without sciatica  -     Discontinue: oxyCODONE-acetaminophen (ENDOCET) 5-325 mg per tablet; Take 1 tablet by mouth every 4 (four) hours as needed for moderate pain Max Daily Amount: 6 tablets          Subjective:      Patient ID: Dariela Hidalgo is a 42 y o  female  Here for routine omt treatment  For chronic back and neck pain      The following portions of the patient's history were reviewed and updated as appropriate: current medications, past family history, past medical history, past social history, past surgical history and problem list     Review of Systems   Constitutional: Negative  Eyes: Negative  Respiratory: Negative  Cardiovascular: Negative  Endocrine: Negative  Genitourinary: Negative  Objective:      /60   Pulse 90   Resp 16   Wt 58 5 kg (129 lb)   LMP 10/31/2018 (Exact Date)   SpO2 100%   BMI 23 59 kg/m²          Physical Exam   Constitutional: She appears well-developed and well-nourished           Paracervical muscle tenderness spasm muscle in June massage done she does not like HVLA done on her neck  Thoracic spine T3-T4 rotated left side bent right HVLA applied with good results

## 2019-03-08 NOTE — PROGRESS NOTES
Assessment/Plan:    No problem-specific Assessment & Plan notes found for this encounter  Diagnoses and all orders for this visit:    Chronic bilateral thoracic back pain    Primary insomnia  -     Discontinue: zolpidem (AMBIEN) 5 mg tablet; Take 1 tablet (5 mg total) by mouth daily    Other orders  -     Discontinue: oxyCODONE-acetaminophen (ENDOCET) 5-325 mg per tablet; Take 1 tablet by mouth every 4 (four) hours as needed for moderate pain Max Daily Amount: 6 tablets          Subjective:      Patient ID: Svetlana Gerber is a 42 y o  female  Here for her routine OMT visit      The following portions of the patient's history were reviewed and updated as appropriate: current medications, past family history, past medical history, past social history, past surgical history and problem list     Review of Systems   Respiratory: Negative  Cardiovascular: Negative  Gastrointestinal: Negative            Objective:      /90 (BP Location: Left arm, Patient Position: Sitting, Cuff Size: Large)   Pulse (!) 108   Temp 97 5 °F (36 4 °C) (Tympanic)   Wt 58 5 kg (128 lb 14 4 oz)   SpO2 98%   BMI 23 58 kg/m²          Physical Exam   Musculoskeletal:   Cervical paraspinal tenderness muscle energy massage applied with good result is a group curve of T3 through T6 that is side bent left and rotated right partial correction of the curve with H the L a    HVLA

## 2019-03-12 ENCOUNTER — TELEPHONE (OUTPATIENT)
Dept: FAMILY MEDICINE CLINIC | Facility: CLINIC | Age: 39
End: 2019-03-12

## 2019-03-14 DIAGNOSIS — F51.01 PRIMARY INSOMNIA: ICD-10-CM

## 2019-03-15 RX ORDER — ZOLPIDEM TARTRATE 5 MG/1
TABLET ORAL
Qty: 60 TABLET | Refills: 0 | Status: SHIPPED | OUTPATIENT
Start: 2019-03-15 | End: 2019-05-09 | Stop reason: SDUPTHER

## 2019-03-23 ENCOUNTER — HOSPITAL ENCOUNTER (EMERGENCY)
Facility: HOSPITAL | Age: 39
Discharge: HOME | End: 2019-03-23
Attending: EMERGENCY MEDICINE | Admitting: EMERGENCY MEDICINE
Payer: COMMERCIAL

## 2019-03-23 ENCOUNTER — APPOINTMENT (EMERGENCY)
Dept: RADIOLOGY | Facility: HOSPITAL | Age: 39
End: 2019-03-23
Payer: COMMERCIAL

## 2019-03-23 VITALS
OXYGEN SATURATION: 100 % | SYSTOLIC BLOOD PRESSURE: 126 MMHG | TEMPERATURE: 97.8 F | HEART RATE: 96 BPM | WEIGHT: 130 LBS | BODY MASS INDEX: 23.04 KG/M2 | RESPIRATION RATE: 18 BRPM | DIASTOLIC BLOOD PRESSURE: 91 MMHG | HEIGHT: 63 IN

## 2019-03-23 DIAGNOSIS — F41.9 ANXIETY: ICD-10-CM

## 2019-03-23 DIAGNOSIS — R55 NEAR SYNCOPE: Primary | ICD-10-CM

## 2019-03-23 LAB
ANION GAP SERPL CALCULATED.3IONS-SCNC: 14 MMOL/L (ref 4–13)
APTT PPP: 22 SECONDS (ref 24–33)
BASOPHILS # BLD AUTO: 0.04 THOUSANDS/ΜL (ref 0–0.1)
BASOPHILS NFR BLD AUTO: 1 % (ref 0–1)
BUN SERPL-MCNC: 22 MG/DL (ref 5–25)
CALCIUM SERPL-MCNC: 9.5 MG/DL (ref 8.3–10.1)
CHLORIDE SERPL-SCNC: 97 MMOL/L (ref 100–108)
CO2 SERPL-SCNC: 22 MMOL/L (ref 21–32)
CREAT SERPL-MCNC: 0.74 MG/DL (ref 0.6–1.3)
DEPRECATED D DIMER PPP: 760 NG/ML (FEU) (ref 190–520)
EOSINOPHIL # BLD AUTO: 0.12 THOUSAND/ΜL (ref 0–0.61)
EOSINOPHIL NFR BLD AUTO: 1 % (ref 0–6)
ERYTHROCYTE [DISTWIDTH] IN BLOOD BY AUTOMATED COUNT: 14.4 % (ref 11.6–15.1)
GFR SERPL CREATININE-BSD FRML MDRD: 104 ML/MIN/1.73SQ M
GLUCOSE SERPL-MCNC: 115 MG/DL (ref 65–140)
GLUCOSE SERPL-MCNC: 118 MG/DL (ref 65–140)
HCT VFR BLD AUTO: 36.8 % (ref 34.8–46.1)
HGB BLD-MCNC: 11.6 G/DL (ref 11.5–15.4)
IMM GRANULOCYTES # BLD AUTO: 0.03 THOUSAND/UL (ref 0–0.2)
IMM GRANULOCYTES NFR BLD AUTO: 0 % (ref 0–2)
INR PPP: 1.01 (ref 0.86–1.16)
LYMPHOCYTES # BLD AUTO: 3.31 THOUSANDS/ΜL (ref 0.6–4.47)
LYMPHOCYTES NFR BLD AUTO: 38 % (ref 14–44)
MCH RBC QN AUTO: 28 PG (ref 26.8–34.3)
MCHC RBC AUTO-ENTMCNC: 31.5 G/DL (ref 31.4–37.4)
MCV RBC AUTO: 89 FL (ref 82–98)
MONOCYTES # BLD AUTO: 0.54 THOUSAND/ΜL (ref 0.17–1.22)
MONOCYTES NFR BLD AUTO: 6 % (ref 4–12)
NEUTROPHILS # BLD AUTO: 4.77 THOUSANDS/ΜL (ref 1.85–7.62)
NEUTS SEG NFR BLD AUTO: 54 % (ref 43–75)
NRBC BLD AUTO-RTO: 0 /100 WBCS
PLATELET # BLD AUTO: 387 THOUSANDS/UL (ref 149–390)
PMV BLD AUTO: 8.9 FL (ref 8.9–12.7)
POTASSIUM SERPL-SCNC: 3.6 MMOL/L (ref 3.5–5.3)
PROTHROMBIN TIME: 10.6 SECONDS (ref 9.4–11.7)
RBC # BLD AUTO: 4.15 MILLION/UL (ref 3.81–5.12)
SODIUM SERPL-SCNC: 133 MMOL/L (ref 136–145)
TROPONIN I SERPL-MCNC: 0.02 NG/ML
TROPONIN I SERPL-MCNC: <0.02 NG/ML
TSH SERPL DL<=0.05 MIU/L-ACNC: 1.48 UIU/ML (ref 0.36–3.74)
WBC # BLD AUTO: 8.81 THOUSAND/UL (ref 4.31–10.16)

## 2019-03-23 PROCEDURE — 96361 HYDRATE IV INFUSION ADD-ON: CPT

## 2019-03-23 PROCEDURE — 96376 TX/PRO/DX INJ SAME DRUG ADON: CPT

## 2019-03-23 PROCEDURE — 36415 COLL VENOUS BLD VENIPUNCTURE: CPT | Performed by: PHYSICIAN ASSISTANT

## 2019-03-23 PROCEDURE — 84484 ASSAY OF TROPONIN QUANT: CPT | Performed by: PHYSICIAN ASSISTANT

## 2019-03-23 PROCEDURE — 99284 EMERGENCY DEPT VISIT MOD MDM: CPT

## 2019-03-23 PROCEDURE — 96374 THER/PROPH/DIAG INJ IV PUSH: CPT

## 2019-03-23 PROCEDURE — 85379 FIBRIN DEGRADATION QUANT: CPT | Performed by: PHYSICIAN ASSISTANT

## 2019-03-23 PROCEDURE — 85730 THROMBOPLASTIN TIME PARTIAL: CPT | Performed by: PHYSICIAN ASSISTANT

## 2019-03-23 PROCEDURE — 82948 REAGENT STRIP/BLOOD GLUCOSE: CPT

## 2019-03-23 PROCEDURE — 80048 BASIC METABOLIC PNL TOTAL CA: CPT | Performed by: PHYSICIAN ASSISTANT

## 2019-03-23 PROCEDURE — 85610 PROTHROMBIN TIME: CPT | Performed by: PHYSICIAN ASSISTANT

## 2019-03-23 PROCEDURE — 93005 ELECTROCARDIOGRAM TRACING: CPT

## 2019-03-23 PROCEDURE — 84443 ASSAY THYROID STIM HORMONE: CPT | Performed by: PHYSICIAN ASSISTANT

## 2019-03-23 PROCEDURE — 85025 COMPLETE CBC W/AUTO DIFF WBC: CPT | Performed by: PHYSICIAN ASSISTANT

## 2019-03-23 PROCEDURE — 71275 CT ANGIOGRAPHY CHEST: CPT

## 2019-03-23 RX ORDER — HYDROXYZINE PAMOATE 25 MG/1
25 CAPSULE ORAL 3 TIMES DAILY PRN
Qty: 15 CAPSULE | Refills: 0 | Status: SHIPPED | OUTPATIENT
Start: 2019-03-23 | End: 2019-05-17 | Stop reason: ALTCHOICE

## 2019-03-23 RX ORDER — LORAZEPAM 2 MG/ML
0.5 INJECTION INTRAMUSCULAR ONCE
Status: COMPLETED | OUTPATIENT
Start: 2019-03-23 | End: 2019-03-23

## 2019-03-23 RX ORDER — ONDANSETRON 2 MG/ML
4 INJECTION INTRAMUSCULAR; INTRAVENOUS ONCE
Status: DISCONTINUED | OUTPATIENT
Start: 2019-03-23 | End: 2019-03-23

## 2019-03-23 RX ADMIN — LORAZEPAM 0.5 MG: 2 INJECTION INTRAMUSCULAR; INTRAVENOUS at 15:42

## 2019-03-23 RX ADMIN — SODIUM CHLORIDE 1000 ML: 0.9 INJECTION, SOLUTION INTRAVENOUS at 14:47

## 2019-03-23 RX ADMIN — LORAZEPAM 0.5 MG: 2 INJECTION INTRAMUSCULAR; INTRAVENOUS at 17:26

## 2019-03-23 RX ADMIN — SODIUM CHLORIDE 1000 ML: 0.9 INJECTION, SOLUTION INTRAVENOUS at 17:27

## 2019-03-23 RX ADMIN — IOHEXOL 65 ML: 350 INJECTION, SOLUTION INTRAVENOUS at 17:17

## 2019-03-23 NOTE — ED PROVIDER NOTES
History  Chief Complaint   Patient presents with    Dizziness     Patient was sitting at nurses station and began to feel dizzy, lightheadness, numbness in hands and mouth and became diaphoretic and patient states"felt warm all over"  41 y/o female presenting with lightheadedness, palpitations and sweating after she attempted to have a BM and was unsuccessful  Patient states she has had constipation over the past 2 days  alternates between diarrhea and constipation with her IBS  States that she was doing a lot of straining without success and started to have some palpitations and felt as though she was going to pass out  She was also experiencing some numbness and tingling of the face, felt very warm and became nauseous  No facial swelling or difficulty breathing  Relays that over the past year she has been having palpitations on and off, has not been evaluated for this as of yet  This feels very similar to prior episodes however is lasting longer  Patient is a [employer] [address] [employer], she was transferred to a bed and evaluated right away, she initially had an elevated HR in the 150s however this came down when she was laid onto a stretcher, bringer her HR down to the 90s and she was then feeling much better  EKG shows NSR  Patient admits she does have anxiety and has had occasional panic attacks  Feeling very anxious currently  No syncopal episodes  Patient states that she has been under increased amounts of stress with school as she is close to a graduation  Denies chest pain, calf pain or swelling, weakness, vomiting, diarrhea, abdominal pain, throat closing sensation, rash  Prior to Admission Medications   Prescriptions Last Dose Informant Patient Reported? Taking?    Dapsone (ACZONE) 5 % topical gel 3/22/2019 at Unknown time  No Yes   Sig: Bid   cholecalciferol (VITAMIN D3) 85999 units capsule   No No   Sig: Take 1 capsule (10,000 Units total) by mouth daily   ferrous sulfate 325 (65 Fe) mg tablet 3/22/2019 at Unknown time  No Yes   Sig: Take 1 tablet (325 mg total) by mouth daily with breakfast   lisinopril (ZESTRIL) 10 mg tablet 3/22/2019 at Unknown time  No Yes   Sig: Take 1 tablet (10 mg total) by mouth daily   naproxen (NAPROSYN) 500 mg tablet 3/22/2019 at Unknown time  No Yes   Sig: Take 1 tablet (500 mg total) by mouth 2 (two) times a day with meals   oxyCODONE-acetaminophen (ENDOCET) 5-325 mg per tablet Past Week at Unknown time  No Yes   Sig: Take 1 tablet by mouth every 4 (four) hours as needed for moderate painMax Daily Amount: 6 tablets   traMADol (ULTRAM) 50 mg tablet 3/23/2019 at Unknown time  No Yes   Sig: Take 1 tablet (50 mg total) by mouth every 6 (six) hours   zolpidem (AMBIEN) 5 mg tablet 3/22/2019 at Unknown time  No Yes   Sig: TAKE 1 TABLET(5 MG) BY MOUTH DAILY      Facility-Administered Medications: None       Past Medical History:   Diagnosis Date    Chronic fatigue     last assessed 16    Endometriosis     Hypoglycemia     Liver lesion     last assessed 10/2/15    Migraine     Ovarian cyst     Scoliosis     last assessed  10/21/13       Past Surgical History:   Procedure Laterality Date     SECTION      x2    LAPAROSCOPY      exploratory    TUBAL LIGATION         Family History   Problem Relation Age of Onset    Hypertension Mother     Skin cancer Mother     Other Mother         high cholesterol    Other Father         high cholesterol    Brain cancer Other     Lung cancer Other     Lung cancer Maternal Grandfather     Other Maternal Grandfather         oral cancer    Throat cancer Maternal Grandfather     Other Other         exposure to agent orange; tobacco use    Alcohol abuse Other     Lung cancer Other     Prostate cancer Other     Anxiety disorder Family     Diabetes Family     Heart disease Family     Cancer Family         lip, oral cavity, or pharynx     I have reviewed and agree with the history as documented      Social History Tobacco Use    Smoking status: Former Smoker    Smokeless tobacco: Never Used   Substance Use Topics    Alcohol use: Yes     Comment: once a year    Drug use: No        Review of Systems   Constitutional: Positive for diaphoresis  Negative for activity change, appetite change, chills, fatigue, fever and unexpected weight change  HENT: Negative  Eyes: Negative  Respiratory: Negative  Cardiovascular: Positive for palpitations  Negative for chest pain and leg swelling  Gastrointestinal: Positive for nausea  Negative for abdominal distention, abdominal pain, anal bleeding, blood in stool, constipation, diarrhea, rectal pain and vomiting  Genitourinary: Negative  Musculoskeletal: Negative  Skin: Negative  Neurological: Positive for light-headedness and numbness  Negative for dizziness, tremors, seizures, syncope, facial asymmetry, speech difficulty, weakness and headaches  All other systems reviewed and are negative  Physical Exam  Physical Exam   Constitutional: She is oriented to person, place, and time  She appears well-developed and well-nourished  HENT:   Head: Normocephalic and atraumatic  Right Ear: External ear normal    Left Ear: External ear normal    Nose: Nose normal    Mouth/Throat: Oropharynx is clear and moist    Eyes: Pupils are equal, round, and reactive to light  Conjunctivae and EOM are normal    No oropharyngeal swelling, exudates   Cardiovascular: Regular rhythm, normal heart sounds and intact distal pulses  Exam reveals no gallop and no friction rub  No murmur heard  Pulmonary/Chest: Effort normal and breath sounds normal  No stridor  No respiratory distress  She has no wheezes  She has no rales  She exhibits no tenderness  spo2 is 100% indicating adequate oxygenation    Abdominal: Soft  Bowel sounds are normal  She exhibits no distension and no mass  There is no tenderness  There is no rebound and no guarding  No hernia     Neurological: She is alert and oriented to person, place, and time  Skin: Skin is warm and dry  Capillary refill takes less than 2 seconds  No rash noted  No erythema  No pallor  Nursing note and vitals reviewed        Vital Signs  ED Triage Vitals [03/23/19 1438]   Temperature Pulse Respirations Blood Pressure SpO2   97 8 °F (36 6 °C) 92 18 142/97 100 %      Temp Source Heart Rate Source Patient Position - Orthostatic VS BP Location FiO2 (%)   Tympanic Monitor Lying Right arm --      Pain Score       No Pain           Vitals:    03/23/19 1515 03/23/19 1530 03/23/19 1726 03/23/19 1745   BP: 133/86  122/85 116/78   Pulse: (!) 116 (!) 138 96 96   Patient Position - Orthostatic VS:   Lying          Visual Acuity      ED Medications  Medications   sodium chloride 0 9 % bolus 1,000 mL (1,000 mL Intravenous New Bag 3/23/19 1727)   sodium chloride 0 9 % bolus 1,000 mL (0 mL Intravenous Stopped 3/23/19 1536)   LORazepam (ATIVAN) 2 mg/mL injection 0 5 mg (0 5 mg Intravenous Given 3/23/19 1542)   LORazepam (ATIVAN) 2 mg/mL injection 0 5 mg (0 5 mg Intravenous Given 3/23/19 1726)   iohexol (OMNIPAQUE) 350 MG/ML injection (MULTI-DOSE) 65 mL (65 mL Intravenous Given 3/23/19 1717)       Diagnostic Studies  Results Reviewed     Procedure Component Value Units Date/Time    Troponin I [[id number]]  (Normal) Collected:  03/23/19 1731    Lab Status:  Final result Specimen:  Blood from Arm, Right Updated:  03/23/19 1756     Troponin I 0 02 ng/mL     Protime-INR [[id number]]  (Normal) Collected:  03/23/19 1545    Lab Status:  Final result Specimen:  Blood from Arm, Right Updated:  03/23/19 1631     Protime 10 6 seconds      INR 1 01    D-dimer, quantitative [[id number]]  (Abnormal) Collected:  03/23/19 1545    Lab Status:  Final result Specimen:  Blood from Arm, Right Updated:  03/23/19 1631     D-Dimer, Quant 760 ng/ml (FEU)     APTT [[id number]]  (Abnormal) Collected:  03/23/19 1545    Lab Status:  Final result Specimen:  Blood from Arm, Right Updated:  03/23/19 1631     PTT 22 seconds     Basic metabolic panel [[id number]]  (Abnormal) Collected:  03/23/19 1446    Lab Status:  Final result Specimen:  Blood from Arm, Right Updated:  03/23/19 1519     Sodium 133 mmol/L      Potassium 3 6 mmol/L      Chloride 97 mmol/L      CO2 22 mmol/L      ANION GAP 14 mmol/L      BUN 22 mg/dL      Creatinine 0 74 mg/dL      Glucose 115 mg/dL      Calcium 9 5 mg/dL      eGFR 104 ml/min/1 73sq m     Narrative:       National Kidney Disease Education Program recommendations are as follows:  GFR calculation is accurate only with a steady state creatinine  Chronic Kidney disease less than 60 ml/min/1 73 sq  meters  Kidney failure less than 15 ml/min/1 73 sq  meters  TSH, 3rd generation with Free T4 reflex [[id number]]  (Normal) Collected:  03/23/19 1446    Lab Status:  Final result Specimen:  Blood from Arm, Right Updated:  03/23/19 1519     TSH 3RD GENERATON 1 478 uIU/mL     Narrative:       Patients undergoing fluorescein dye angiography may retain small amounts of fluorescein in the body for 48-72 hours post procedure  Samples containing fluorescein can produce falsely depressed TSH values  If the patient had this procedure,a specimen should be resubmitted post fluorescein clearance      Troponin I [[id number]]  (Normal) Collected:  03/23/19 1446    Lab Status:  Final result Specimen:  Blood from Arm, Right Updated:  03/23/19 1514     Troponin I <0 02 ng/mL     CBC and differential [[id number]] Collected:  03/23/19 1446    Lab Status:  Final result Specimen:  Blood from Arm, Right Updated:  03/23/19 1454     WBC 8 81 Thousand/uL      RBC 4 15 Million/uL      Hemoglobin 11 6 g/dL      Hematocrit 36 8 %      MCV 89 fL      MCH 28 0 pg      MCHC 31 5 g/dL      RDW 14 4 %      MPV 8 9 fL      Platelets 418 Thousands/uL      nRBC 0 /100 WBCs      Neutrophils Relative 54 %      Immat GRANS % 0 %      Lymphocytes Relative 38 %      Monocytes Relative 6 %      Eosinophils Relative 1 %      Basophils Relative 1 %      Neutrophils Absolute 4 77 Thousands/µL      Immature Grans Absolute 0 03 Thousand/uL      Lymphocytes Absolute 3 31 Thousands/µL      Monocytes Absolute 0 54 Thousand/µL      Eosinophils Absolute 0 12 Thousand/µL      Basophils Absolute 0 04 Thousands/µL     Fingerstick Glucose (POCT) [[id number]]  (Normal) Collected:  03/23/19 1433    Lab Status:  Final result Updated:  03/23/19 1435     POC Glucose 118 mg/dl                  CTA ED chest PE study   Final Result by Roxann Reyna MD (03/23 1727)   Moderate mid thoracic dextroscoliosis      No significant intrathoracic pathology identified      Specifically, no evidence to confirm pulmonary embolism                     Workstation performed: [id number]                    Procedures  ECG 12 Lead Documentation  Date/Time: 3/23/2019 2:42 PM  Performed by: Cristiano Romero PA-C  Authorized by: Cristiano Romero PA-C     Indications / Diagnosis:  Lightheadedness  ECG reviewed by me, the ED Provider: yes    Patient location:  ED  Previous ECG:     Previous ECG:  Unavailable    Comparison to cardiac monitor: Yes    Interpretation:     Interpretation: abnormal    Rate:     ECG rate:  99    ECG rate assessment: normal    Rhythm:     Rhythm: sinus rhythm    Ectopy:     Ectopy: none    QRS:     QRS axis:  Normal    QRS intervals:  Normal  Conduction:     Conduction: normal (prolonged QT )    ST segments:     ST segments:  Abnormal    Depression:  II and III  T waves:     T waves: inverted      Inverted:  III and II    ECG 12 Lead Documentation  Date/Time: 3/23/2019 6:03 PM  Performed by: Cristiano Romero PA-C  Authorized by: Cristiano Romero PA-C     Indications / Diagnosis:  Repeat  ECG reviewed by me, the ED Provider: yes    Patient location:  ED  Previous ECG:     Previous ECG:  Compared to current    Comparison ECG info:  No longer has inverted T waves in the inferior leads      Similarity:  Changes noted    Comparison to cardiac monitor: Yes Interpretation:     Interpretation: normal    Rate:     ECG rate:  86    ECG rate assessment: normal    Rhythm:     Rhythm: sinus rhythm    Ectopy:     Ectopy: none    QRS:     QRS axis:  Normal    QRS intervals:  Normal  Conduction:     Conduction: normal    ST segments:     ST segments:  Normal  T waves:     T waves: normal             Phone Contacts  ED Phone Contact    ED Course  ED Course as of Mar 23 1808   Sat Mar 23, 2019   1446 Patient feeling much better now that she is laying down  No longer has numbness or tingling, no longer diaphoretic  EKG discussed with Dr Mitzi Delgado  1534 Patient very anxious, getting repeat EKG   She states she gets anxious when taking medications   Pulse(!): 138   1534 EKG unchanged       1758 Re-evaluated patient, feeling much better, no longer symptomatic                                   MDM  Number of Diagnoses or Management Options  Near syncope:   Diagnosis management comments: Discussed with patient results the lab tests and imaging studies and EKG  Patient believes she may have had a panic attack  Patient likely had a near vasovagal episode/ pre syncopal event while bearing down as she was constipated, patient states that during that time she was trying to calm herself down in the bathroom however had difficulty doing so  I believe her heart rate was driven by her panic attack  Patient was able have a bowel movement in the emergency department feeling much better afterwards  Heart rate came down  Repeat troponin normal, her EKG was also repeated no longer showing inverted T waves in the inferior leads, this was likely due to rate related changes as prior to getting the first EKG her HR was 140s  Will have her follow up with Cardiology for re-evaluation with strict return precautions for any worsening  Will give a script for anxiety for vistaril  Informed not to drive or operate heavy machinery while taking Vistaril    Patient verbalizes understanding and agrees with the above assessment plan  Case discussed with Dr Viviane Barrientos  Amount and/or Complexity of Data Reviewed  Clinical lab tests: ordered and reviewed  Tests in the radiology section of CPT®: reviewed and ordered  Review and summarize past medical records: yes  Discuss the patient with other providers: yes (Dr Viviane Barrientos, Dr Mariano Faria)  Independent visualization of images, tracings, or specimens: yes        Disposition  Final diagnoses:   Near syncope   Anxiety     Time reflects when diagnosis was documented in both MDM as applicable and the Disposition within this note     Time User Action Codes Description Comment    3/23/2019  6:05 PM Lavina Gosselin Add [R55] Near syncope     3/23/2019  6:06 PM Lavina Gosselin Add [F41 9] Anxiety       ED Disposition     ED Disposition Condition Date/Time Comment    Discharge Good Sat Mar 23, 2019  6:05 PM [place] discharge to home/self care  Follow-up Information     Follow up With Specialties Details Why Contact Info Additional Information    Yuri Chapa DO Family Medicine, Internal Medicine Schedule an appointment as soon as possible for a visit   [address]   [address] Emergency Department Emergency Medicine Go to  If symptoms worsen [address] [place] [address]  [phone] [facility] ED, [place], [place], [address]    Delfin Tsang MD Cardiology Schedule an appointment as soon as possible for a visit   [address] [address] [phone]             Patient's Medications   Discharge Prescriptions    HYDROXYZINE PAMOATE (VISTARIL) 25 MG CAPSULE    Take 1 capsule (25 mg total) by mouth 3 (three) times a day as needed for anxiety for up to 5 days       Start Date: 3/23/2019 End Date: 3/28/2019       Order Dose: 25 mg       Quantity: 15 capsule    Refills: 0     No discharge procedures on file      ED Provider  Electronically Signed by           Alysa Braun PA-C  03/23/19 2231

## 2019-03-23 NOTE — ED NOTES
Got patient up out of bed with assistance with Radha Orozco RN and patient placed in wheelchair to try and go to bathroom to have BM  Once patient was at front door of bathroom, she stated "my face is getting warm and I'm afraid of the same thing happening to me in the bathroom, I don't want to do this"  Patient then stated she wanted to just go and lay back down  We took patient back to room and reconnected to monitor        Shahab Langford RN  03/23/19 0568

## 2019-03-25 ENCOUNTER — VBI (OUTPATIENT)
Dept: FAMILY MEDICINE CLINIC | Facility: CLINIC | Age: 39
End: 2019-03-25

## 2019-03-25 LAB
ATRIAL RATE: 109 BPM
ATRIAL RATE: 86 BPM
ATRIAL RATE: 91 BPM
ATRIAL RATE: 99 BPM
P AXIS: 53 DEGREES
P AXIS: 60 DEGREES
P AXIS: 66 DEGREES
P AXIS: 67 DEGREES
PR INTERVAL: 130 MS
PR INTERVAL: 134 MS
PR INTERVAL: 136 MS
PR INTERVAL: 146 MS
QRS AXIS: 38 DEGREES
QRS AXIS: 39 DEGREES
QRS AXIS: 50 DEGREES
QRS AXIS: 55 DEGREES
QRSD INTERVAL: 86 MS
QRSD INTERVAL: 90 MS
QRSD INTERVAL: 98 MS
QRSD INTERVAL: 98 MS
QT INTERVAL: 344 MS
QT INTERVAL: 372 MS
QT INTERVAL: 380 MS
QT INTERVAL: 388 MS
QTC INTERVAL: 445 MS
QTC INTERVAL: 463 MS
QTC INTERVAL: 477 MS
QTC INTERVAL: 487 MS
T WAVE AXIS: -3 DEGREES
T WAVE AXIS: -4 DEGREES
T WAVE AXIS: 40 DEGREES
T WAVE AXIS: 9 DEGREES
VENTRICULAR RATE: 109 BPM
VENTRICULAR RATE: 86 BPM
VENTRICULAR RATE: 91 BPM
VENTRICULAR RATE: 99 BPM

## 2019-03-25 PROCEDURE — 93010 ELECTROCARDIOGRAM REPORT: CPT | Performed by: INTERNAL MEDICINE

## 2019-03-26 ENCOUNTER — OFFICE VISIT (OUTPATIENT)
Dept: CARDIOLOGY CLINIC | Facility: CLINIC | Age: 39
End: 2019-03-26
Payer: COMMERCIAL

## 2019-03-26 VITALS
DIASTOLIC BLOOD PRESSURE: 78 MMHG | HEART RATE: 75 BPM | HEIGHT: 63 IN | BODY MASS INDEX: 22.89 KG/M2 | OXYGEN SATURATION: 98 % | WEIGHT: 129.2 LBS | SYSTOLIC BLOOD PRESSURE: 108 MMHG

## 2019-03-26 DIAGNOSIS — R00.0 TACHYCARDIA: ICD-10-CM

## 2019-03-26 DIAGNOSIS — R07.9 CHEST PAIN, UNSPECIFIED TYPE: ICD-10-CM

## 2019-03-26 DIAGNOSIS — F41.0 PANIC ATTACKS: ICD-10-CM

## 2019-03-26 DIAGNOSIS — R94.31 ABNORMAL EKG: ICD-10-CM

## 2019-03-26 DIAGNOSIS — M41.9 SCOLIOSIS OF THORACIC SPINE, UNSPECIFIED SCOLIOSIS TYPE: ICD-10-CM

## 2019-03-26 DIAGNOSIS — I10 ESSENTIAL HYPERTENSION: ICD-10-CM

## 2019-03-26 PROCEDURE — 99244 OFF/OP CNSLTJ NEW/EST MOD 40: CPT | Performed by: INTERNAL MEDICINE

## 2019-03-26 RX ORDER — CARVEDILOL 3.12 MG/1
3.12 TABLET ORAL 2 TIMES DAILY WITH MEALS
Qty: 60 TABLET | Refills: 1 | Status: SHIPPED | OUTPATIENT
Start: 2019-03-26 | End: 2019-05-17 | Stop reason: SDUPTHER

## 2019-03-26 RX ORDER — LISINOPRIL 10 MG/1
5 TABLET ORAL DAILY
Qty: 90 TABLET | Refills: 0
Start: 2019-03-26 | End: 2019-05-17

## 2019-03-26 NOTE — PROGRESS NOTES
Consultation - Cardiology Office  [facility]  [place] 42 y o  female MRN: [id number]  : 1980  Unit/Bed#:  Encounter: [id number]      Assessment:     1  Tachycardia    2  Chest pain, unspecified type    3  Essential hypertension    4  Abnormal EKG    5  Scoliosis of thoracic spine, unspecified scoliosis type    6  Panic attacks        Discussion summary and Plan:  1  Atypical chest pain when she had a panic attack  She also was tachycardic at that time  EKG was abnormal   We will schedule her for stress echo  EKG has normalized when heart rate was slower  Tachycardia was sinus tachycardia will check Holter monitor to rule out any arrhythmias  2  Essential hypertension  Patient has longstanding history of essential hypertension  She does not want to get pregnant she understand that she is on lisinopril long-term and it can cause both defects in her child if she become pregnant  Since she is tachycardic I will decrease lisinopril to 5 mg and start on Coreg 3  125 twice a day  She uses protection all the time they are not expecting pregnancy now and in future as she has problem with her 1st pregnancy  She has been on lisinopril for the last for 5 years  3  Tachycardia  Most likely sinus tachycardia  Holter monitor ordered will check echo Doppler for any cardiomyopathy  She does have history of panic attacks    4  History of scoliosis  Management as per primary care doctor and Orthopedics  She has chronic back pain and she uses lot of pain medication including anti-inflammatory which could raise blood pressure  Advised to follow up with primary care doctor or her pain management doctor as she is using lot of pain medications  5  History of panic attacks    Plan  Start her on Coreg 3 125 twice a day    If tolerated well with slowly taper of lisinopril  Echo Doppler  Echo stress test  Advise anti-inflammatory like Naprosyn she almost takes twice a day which can cause kidney issues as well as high blood pressure and even risk of stroke  Holter monitor  All those issues discussed with patient 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  Thank you for your consultation  If he have any question please call me at [phone]  Counseling :  A description of the counseling  Goals and Barriers  Patient's ability to self care: Yes  Medication side effect reviewed with patient in detail and all their questions answered to their satisfaction  Primary Care Physician Requesting Consult: Juan Carlos Iron, DO    Reason for Consult / Principal Problem:  Tachycardia        HPI :     Ken Josy is a 42y o  year old female who was referred by primary care doctor for recent episodes of palpitations  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[employer] was at work when she was not feeling well because of constipation  She tried to get a bowel movement however it was not successful she start feeling dizzy lightheaded and felt she is going to pass out  She was tachycardic  She does have history of panic attacks she became very anxious and her heart rate was very fast   At that time an EKG was done which shows she had ST depression and prolonged QTC and D-dimer was elevated blood test   She wondered went CT scan which shows no evidence of any  Pulmonary embolism but she was found scoliosis  Later as she got better her blood pressure also improved and heart rate improved by EKG came to baseline  She denies any chest pain  No other issues  She has a past medical history significant for essential hypertension when she was pregnant with her 1st child, atrial bowel syndrome, anxiety, scoliosis, panic attacks  She used to drink 1-2 cups of coffee a day now decreased  She sexually active but she uses protections she has been on lisinopril for some time  No other issues at this time    He may    Review of Systems   Constitutional: Negative for activity change, chills, diaphoresis, fever and unexpected weight change  HENT: Negative for congestion  Eyes: Negative for discharge and redness  Respiratory: Positive for chest tightness  Negative for cough, shortness of breath and wheezing  Cardiovascular: Positive for chest pain  Negative for palpitations and leg swelling  Gastrointestinal: Negative for abdominal pain, diarrhea and nausea  Endocrine: Negative  Genitourinary: Negative for decreased urine volume and urgency  Musculoskeletal: Positive for back pain  Negative for arthralgias and gait problem  Skin: Negative for rash and wound  Allergic/Immunologic: Negative  Neurological: Negative for dizziness, seizures, syncope, weakness, light-headedness and headaches  Hematological: Negative  Psychiatric/Behavioral: Negative for agitation and confusion  The patient is nervous/anxious          Historical Information   Past Medical History:   Diagnosis Date    Chronic fatigue     last assessed 16    Endometriosis     Hypoglycemia     Liver lesion     last assessed 10/2/15    Migraine     Ovarian cyst     Scoliosis     last assessed  10/21/13     Past Surgical History:   Procedure Laterality Date     SECTION      x2    LAPAROSCOPY      exploratory    TUBAL LIGATION       Social History     Substance and Sexual Activity   Alcohol Use Yes    Comment: once a year     Social History     Substance and Sexual Activity   Drug Use No     Social History     Tobacco Use   Smoking Status Former Smoker   Smokeless Tobacco Never Used     Family History:   Family History   Problem Relation Age of Onset    Hypertension Mother     Skin cancer Mother     Other Mother         high cholesterol    Other Father         high cholesterol    Brain cancer Other     Lung cancer Other     Lung cancer Maternal Grandfather     Other Maternal Grandfather         oral cancer    Throat cancer Maternal Grandfather     Other Other         exposure to agent orange; tobacco use    Alcohol abuse Other     Lung cancer Other     Prostate cancer Other     Anxiety disorder Family     Diabetes Family     Heart disease Family     Cancer Family         lip, oral cavity, or pharynx       Meds/Allergies     Allergies   Allergen Reactions    Cephalexin      Chest pain    Levofloxacin Diarrhea    Methylprednisolone     Sulfa Antibiotics Hives     With swelling       Current Outpatient Medications:     cholecalciferol (VITAMIN D3) 66268 units capsule, Take 1 capsule (10,000 Units total) by mouth daily, Disp: 90 capsule, Rfl: 0    Dapsone (ACZONE) 5 % topical gel, Bid, Disp: 90 g, Rfl: 0    ferrous sulfate 325 (65 Fe) mg tablet, Take 1 tablet (325 mg total) by mouth daily with breakfast, Disp: 90 tablet, Rfl: 0    hydrOXYzine pamoate (VISTARIL) 25 mg capsule, Take 1 capsule (25 mg total) by mouth 3 (three) times a day as needed for anxiety for up to 5 days, Disp: 15 capsule, Rfl: 0    lisinopril (ZESTRIL) 10 mg tablet, Take 0 5 tablets (5 mg total) by mouth daily, Disp: 90 tablet, Rfl: 0    naproxen (NAPROSYN) 500 mg tablet, Take 1 tablet (500 mg total) by mouth 2 (two) times a day with meals (Patient taking differently: Take 500 mg by mouth 2 (two) times a day with meals ), Disp: 60 tablet, Rfl: 0    oxyCODONE-acetaminophen (ENDOCET) 5-325 mg per tablet, Take 1 tablet by mouth every 4 (four) hours as needed for moderate painMax Daily Amount: 6 tablets, Disp: 120 tablet, Rfl: 0    traMADol (ULTRAM) 50 mg tablet, Take 1 tablet (50 mg total) by mouth every 6 (six) hours, Disp: 90 tablet, Rfl: 4    carvedilol (COREG) 3 125 mg tablet, Take 1 tablet (3 125 mg total) by mouth 2 (two) times a day with meals, Disp: 60 tablet, Rfl: 1    zolpidem (AMBIEN) 5 mg tablet, TAKE 1 TABLET(5 MG) BY MOUTH DAILY (Patient not taking: Reported on 3/26/2019), Disp: 60 tablet, Rfl: 0    Vitals: Blood pressure 108/78, pulse 75, height 5' 3" (1 6 m), weight 58 6 kg (129 lb 3 2 oz), last menstrual period 03/20/2019, SpO2 98 %, not currently breastfeeding  Body mass index is 22 89 kg/m²  Vitals:    03/26/19 1255   Weight: 58 6 kg (129 lb 3 2 oz)     BP Readings from Last 3 Encounters:   03/26/19 108/78   03/23/19 126/91   02/18/19 108/70         Physical Exam    Neurologic:  Alert & oriented x 3, no new focal deficits, Not in any acute distress,  Constitutional:  Well developed, well nourished, non-toxic appearance   Eyes:  Pupil equal and reacting to light, conjunctiva normal, No JVP, No LNP   HENT:  Atraumatic, oropharynx moist, Neck- normal range of motion, no tenderness,  Neck supple   Respiratory:  Bilateral air entry, mostly clear to auscultation  Cardiovascular: S1-S2 regular with a 2/6 ejection systolic murmur and S4 is present  GI:  Soft, nondistended, normal bowel sounds, nontender, no hepatosplenomegaly appreciated  Musculoskeletal:  No edema, no tenderness, no deformities  Skin:  Well hydrated, no rash   Lymphatic:  No lymphadenopathy noted   Extremities:  No edema and distal pulses are present    Diagnostic Studies Review Cardio:    None recent    EKG:     Initial EKG done on March 2019 shows sinus tachycardia heart rate 110 beats per minute  Nonspecific ST changes cannot rule out ischemia  QT was prolonged  Repeat EKG when her heart rate was 80 beats per minute shows normal sinus some ST segment depression is no longer seen  Imaging:  Chest X-Ray:   No Chest XR results available for this patient  CT-scan of the chest:    CTA of the chest done on 03/23/2019 shows moderate mild thoracic dextro scoliosis    Lab Review   Lab Results   Component Value Date    WBC 8 81 03/23/2019    HGB 11 6 03/23/2019    HCT 36 8 03/23/2019    MCV 89 03/23/2019    RDW 14 4 03/23/2019     03/23/2019     BMP:  Lab Results   Component Value Date    SODIUM 133 (L) 03/23/2019    K 3 6 03/23/2019    CL 97 (L) 03/23/2019    CO2 22 03/23/2019    BUN 22 03/23/2019    CREATININE 0 74 03/23/2019    GLUC 115 03/23/2019    GLUF 90 09/26/2018    CALCIUM 9 5 03/23/2019    EGFR 104 03/23/2019     LFT:  Lab Results   Component Value Date    AST 18 09/26/2018    ALT 22 09/26/2018    ALKPHOS 50 09/26/2018    TP 6 5 09/26/2018    ALB 3 5 09/26/2018      Lab Results   Component Value Date    XYL9YPWCXPHJ 1 478 03/23/2019     No results found for: HGBA1C  Lipid Profile:   Lab Results   Component Value Date    CHOLESTEROL 157 09/26/2018    HDL 55 09/26/2018    LDLCALC 88 09/26/2018    TRIG 72 09/26/2018     Lab Results   Component Value Date    CHOLESTEROL 157 09/26/2018     Lab Results   Component Value Date    TROPONINI 0 02 03/23/2019     No results found for: NTBNP         Dr Marissa Oakes MD [facility]      "This note has been constructed using a voice recognition system  Therefore there may be syntax, spelling, and/or grammatical errors   Please call if you have any questions  "

## 2019-03-26 NOTE — LETTER
2019     Corwin Manriquez, [address]    Patient: Javier Combs   YOB: 1980   Date of Visit: 3/26/2019       Dear Dr Blanche Vanessa: Thank you for referring Antoinette Hills to me for evaluation  Below are my notes for this consultation  If you have questions, please do not hesitate to call me  I look forward to following your patient along with you  Sincerely,        Shreyas Crain MD        CC: No Recipients  Shreyas Crain MD  3/26/2019  5:30 PM  Incomplete    Consultation - Cardiology Office  [facility]  [place] 42 y o  female MRN: [id number]  : 1980  Unit/Bed#:  Encounter: [id number]      Assessment:     1  Tachycardia    2  Chest pain, unspecified type    3  Essential hypertension    4  Abnormal EKG    5  Scoliosis of thoracic spine, unspecified scoliosis type    6  Panic attacks        Discussion summary and Plan:  1  Atypical chest pain when she had a panic attack  She also was tachycardic at that time  EKG was abnormal   We will schedule her for stress echo  EKG has normalized when heart rate was slower  Tachycardia was sinus tachycardia will check Holter monitor to rule out any arrhythmias  2  Essential hypertension  Patient has longstanding history of essential hypertension  She does not want to get pregnant she understand that she is on lisinopril long-term and it can cause both defects in her child if she become pregnant  Since she is tachycardic I will decrease lisinopril to 5 mg and start on Coreg 3  125 twice a day  She uses protection all the time they are not expecting pregnancy now and in future as she has problem with her 1st pregnancy  She has been on lisinopril for the last for 5 years  3  Tachycardia  Most likely sinus tachycardia  Holter monitor ordered will check echo Doppler for any cardiomyopathy  She does have history of panic attacks    4  History of scoliosis  Management as per primary care doctor and Orthopedics  She has chronic back pain and she uses lot of pain medication including anti-inflammatory which could raise blood pressure  Advised to follow up with primary care doctor or her pain management doctor as she is using lot of pain medications  5  History of panic attacks    Plan  Start her on Coreg 3 125 twice a day  If tolerated well with slowly taper of lisinopril  Echo Doppler  Echo stress test  Advise anti-inflammatory like Naprosyn she almost takes twice a day which can cause kidney issues as well as high blood pressure and even risk of stroke  Holter monitor  All those issues discussed with patient 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  Thank you for your consultation  If he have any question please call me at [phone]  Counseling :  A description of the counseling  Goals and Barriers  Patient's ability to self care: Yes  Medication side effect reviewed with patient in detail and all their questions answered to their satisfaction  Primary Care Physician Requesting Consult: Mckayla Serum, DO    Reason for Consult / Principal Problem:  Tachycardia        HPI :     Kristina Bar is a 42y o  year old female who was referred by primary care doctor for recent episodes of palpitations  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 she was not feeling well because of constipation  She tried to get a bowel movement however it was not successful she start feeling dizzy lightheaded and felt she is going to pass out  She was tachycardic  She does have history of panic attacks she became very anxious and her heart rate was very fast   At that time an EKG was done which shows she had ST depression and prolonged QTC and D-dimer was elevated blood test   She wondered went CT scan which shows no evidence of any  Pulmonary embolism but she was found scoliosis    Later as she got better her blood pressure also improved and heart rate improved by EKG came to baseline  She denies any chest pain  No other issues  She has a past medical history significant for essential hypertension when she was pregnant with her 1st child, atrial bowel syndrome, anxiety, scoliosis, panic attacks  She used to drink 1-2 cups of coffee a day now decreased  She sexually active but she uses protections she has been on lisinopril for some time  No other issues at this time  He may    Review of Systems   Constitutional: Negative for activity change, chills, diaphoresis, fever and unexpected weight change  HENT: Negative for congestion  Eyes: Negative for discharge and redness  Respiratory: Positive for chest tightness  Negative for cough, shortness of breath and wheezing  Cardiovascular: Positive for chest pain  Negative for palpitations and leg swelling  Gastrointestinal: Negative for abdominal pain, diarrhea and nausea  Endocrine: Negative  Genitourinary: Negative for decreased urine volume and urgency  Musculoskeletal: Positive for back pain  Negative for arthralgias and gait problem  Skin: Negative for rash and wound  Allergic/Immunologic: Negative  Neurological: Negative for dizziness, seizures, syncope, weakness, light-headedness and headaches  Hematological: Negative  Psychiatric/Behavioral: Negative for agitation and confusion  The patient is nervous/anxious          Historical Information   Past Medical History:   Diagnosis Date    Chronic fatigue     last assessed 16    Endometriosis     Hypoglycemia     Liver lesion     last assessed 10/2/15    Migraine     Ovarian cyst     Scoliosis     last assessed  10/21/13     Past Surgical History:   Procedure Laterality Date     SECTION      x2    LAPAROSCOPY      exploratory    TUBAL LIGATION       Social History     Substance and Sexual Activity   Alcohol Use Yes    Comment: once a year     Social History Substance and Sexual Activity   Drug Use No     Social History     Tobacco Use   Smoking Status Former Smoker   Smokeless Tobacco Never Used     Family History:   Family History   Problem Relation Age of Onset    Hypertension Mother     Skin cancer Mother     Other Mother         high cholesterol    Other Father         high cholesterol    Brain cancer Other     Lung cancer Other     Lung cancer Maternal Grandfather     Other Maternal Grandfather         oral cancer    Throat cancer Maternal Grandfather     Other Other         exposure to agent orange; tobacco use    Alcohol abuse Other     Lung cancer Other     Prostate cancer Other     Anxiety disorder Family     Diabetes Family     Heart disease Family     Cancer Family         lip, oral cavity, or pharynx       Meds/Allergies     Allergies   Allergen Reactions    Cephalexin      Chest pain    Levofloxacin Diarrhea    Methylprednisolone     Sulfa Antibiotics Hives     With swelling       Current Outpatient Medications:     cholecalciferol (VITAMIN D3) 99427 units capsule, Take 1 capsule (10,000 Units total) by mouth daily, Disp: 90 capsule, Rfl: 0    Dapsone (ACZONE) 5 % topical gel, Bid, Disp: 90 g, Rfl: 0    ferrous sulfate 325 (65 Fe) mg tablet, Take 1 tablet (325 mg total) by mouth daily with breakfast, Disp: 90 tablet, Rfl: 0    hydrOXYzine pamoate (VISTARIL) 25 mg capsule, Take 1 capsule (25 mg total) by mouth 3 (three) times a day as needed for anxiety for up to 5 days, Disp: 15 capsule, Rfl: 0    lisinopril (ZESTRIL) 10 mg tablet, Take 0 5 tablets (5 mg total) by mouth daily, Disp: 90 tablet, Rfl: 0    naproxen (NAPROSYN) 500 mg tablet, Take 1 tablet (500 mg total) by mouth 2 (two) times a day with meals (Patient taking differently: Take 500 mg by mouth 2 (two) times a day with meals ), Disp: 60 tablet, Rfl: 0    oxyCODONE-acetaminophen (ENDOCET) 5-325 mg per tablet, Take 1 tablet by mouth every 4 (four) hours as needed for moderate painMax Daily Amount: 6 tablets, Disp: 120 tablet, Rfl: 0    traMADol (ULTRAM) 50 mg tablet, Take 1 tablet (50 mg total) by mouth every 6 (six) hours, Disp: 90 tablet, Rfl: 4    carvedilol (COREG) 3 125 mg tablet, Take 1 tablet (3 125 mg total) by mouth 2 (two) times a day with meals, Disp: 60 tablet, Rfl: 1    zolpidem (AMBIEN) 5 mg tablet, TAKE 1 TABLET(5 MG) BY MOUTH DAILY (Patient not taking: Reported on 3/26/2019), Disp: 60 tablet, Rfl: 0    Vitals: Blood pressure 108/78, pulse 75, height 5' 3" (1 6 m), weight 58 6 kg (129 lb 3 2 oz), last menstrual period 03/20/2019, SpO2 98 %, not currently breastfeeding  Body mass index is 22 89 kg/m²  Vitals:    03/26/19 1255   Weight: 58 6 kg (129 lb 3 2 oz)     BP Readings from Last 3 Encounters:   03/26/19 108/78   03/23/19 126/91   02/18/19 108/70         Physical Exam    Neurologic:  Alert & oriented x 3, no new focal deficits, Not in any acute distress,  Constitutional:  Well developed, well nourished, non-toxic appearance   Eyes:  Pupil equal and reacting to light, conjunctiva normal, No JVP, No LNP   HENT:  Atraumatic, oropharynx moist, Neck- normal range of motion, no tenderness,  Neck supple   Respiratory:  Bilateral air entry, mostly clear to auscultation  Cardiovascular: S1-S2 regular with a 2/6 ejection systolic murmur and S4 is present  GI:  Soft, nondistended, normal bowel sounds, nontender, no hepatosplenomegaly appreciated  Musculoskeletal:  No edema, no tenderness, no deformities  Skin:  Well hydrated, no rash   Lymphatic:  No lymphadenopathy noted   Extremities:  No edema and distal pulses are present    Diagnostic Studies Review Cardio:    None recent    EKG:     Initial EKG done on March 2019 shows sinus tachycardia heart rate 110 beats per minute  Nonspecific ST changes cannot rule out ischemia  QT was prolonged      Repeat EKG when her heart rate was 80 beats per minute shows normal sinus some ST segment depression is no longer seen       Imaging:  Chest X-Ray:   No Chest XR results available for this patient  CT-scan of the chest:    CTA of the chest done on 2019 shows moderate mild thoracic dextro scoliosis  Lab Review   Lab Results   Component Value Date    WBC 8 81 2019    HGB 11 6 2019    HCT 36 8 2019    MCV 89 2019    RDW 14 4 2019     2019     BMP:  Lab Results   Component Value Date    SODIUM 133 (L) 2019    K 3 6 2019    CL 97 (L) 2019    CO2 22 2019    BUN 22 2019    CREATININE 0 74 2019    GLUC 115 2019    GLUF 90 2018    CALCIUM 9 5 2019    EGFR 104 2019     LFT:  Lab Results   Component Value Date    AST 18 2018    ALT 22 2018    ALKPHOS 50 2018    TP 6 5 2018    ALB 3 5 2018      Lab Results   Component Value Date    [id number] 1 478 2019     No results found for: HGBA1C  Lipid Profile:   Lab Results   Component Value Date    CHOLESTEROL 157 2018    HDL 55 2018    LDLCALC 88 2018    TRIG 72 2018     Lab Results   Component Value Date    CHOLESTEROL 157 2018     Lab Results   Component Value Date    TROPONINI 0 02 2019     No results found for: NTBNP         Dr Ankush Ag MD [facility]      "This note has been constructed using a voice recognition system  Therefore there may be syntax, spelling, and/or grammatical errors  Please call if you have any questions  "    Ankush Ag MD  3/26/2019  1:39 PM  Incomplete    Consultation - Cardiology Office   [facility]  [place] 42 y o  female MRN: [id number]  : 1980  Unit/Bed#:  Encounter: [id number]      Assessment:     1  Essential hypertension    2  Scoliosis of thoracic spine, unspecified scoliosis type    3  Tachycardia    4  Panic attacks    5  Abnormal EKG    6  Chest pain, unspecified type        Discussion summary and Plan:     Thank you for your consultation  If he have any question please call me at [phone]  Counseling :  A description of the counseling  Goals and Barriers  Patient's ability to self care: Yes  Medication side effect reviewed with patient in detail and all their questions answered to their satisfaction  Primary Care Physician Requesting Consult: Arlene Marquez,     Reason for Consult / Principal Problem:  Tachycardia        HPI :     Terrell Nam is a 42y o  year old female who was referred by primary care doctor for recent episodes of palpitations  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 because of constipation  She tried to get a bowel movement however it was not successful she start feeling dizzy lightheaded and felt she is going to pass out  She was tachycardic  She does have history of panic attacks she became very anxious and her heart rate was very fast   At that time an EKG was done which shows she had ST depression and prolonged QTC and D-dimer was elevated blood test   She wondered went CT scan which shows no evidence of any  Pulmonary embolism but she was found scoliosis  Later as she got better her blood pressure also improved and heart rate improved by EKG came to baseline  She denies any chest pain  No other issues  She has a past medical history significant for essential hypertension when she was pregnant with her 1st child, atrial bowel syndrome, anxiety, scoliosis, panic attacks  She used to drink 1-2 cups of coffee a day now decreased  She sexually active but she uses protections she has been on lisinopril for some time  No other issues at this time  He may    Review of Systems   Constitutional: Negative for activity change, chills, diaphoresis, fever and unexpected weight change  HENT: Negative for congestion  Eyes: Negative for discharge and redness  Respiratory: Positive for chest tightness   Negative for cough, shortness of breath and wheezing  Cardiovascular: Positive for chest pain  Negative for palpitations and leg swelling  Gastrointestinal: Negative for abdominal pain, diarrhea and nausea  Endocrine: Negative  Genitourinary: Negative for decreased urine volume and urgency  Musculoskeletal: Positive for back pain  Negative for arthralgias and gait problem  Skin: Negative for rash and wound  Allergic/Immunologic: Negative  Neurological: Negative for dizziness, seizures, syncope, weakness, light-headedness and headaches  Hematological: Negative  Psychiatric/Behavioral: Negative for agitation and confusion  The patient is nervous/anxious          Historical Information   Past Medical History:   Diagnosis Date    Chronic fatigue     last assessed 16    Endometriosis     Hypoglycemia     Liver lesion     last assessed 10/2/15    Migraine     Ovarian cyst     Scoliosis     last assessed  10/21/13     Past Surgical History:   Procedure Laterality Date     SECTION      x2    LAPAROSCOPY      exploratory    TUBAL LIGATION       Social History     Substance and Sexual Activity   Alcohol Use Yes    Comment: once a year     Social History     Substance and Sexual Activity   Drug Use No     Social History     Tobacco Use   Smoking Status Former Smoker   Smokeless Tobacco Never Used     Family History:   Family History   Problem Relation Age of Onset    Hypertension Mother     Skin cancer Mother     Other Mother         high cholesterol    Other Father         high cholesterol    Brain cancer Other     Lung cancer Other     Lung cancer Maternal Grandfather     Other Maternal Grandfather         oral cancer    Throat cancer Maternal Grandfather     Other Other         exposure to agent orange; tobacco use    Alcohol abuse Other     Lung cancer Other     Prostate cancer Other     Anxiety disorder Family     Diabetes Family     Heart disease Family     Cancer Family lip, oral cavity, or pharynx       Meds/Allergies     Allergies   Allergen Reactions    Cephalexin      Chest pain    Levofloxacin Diarrhea    Methylprednisolone     Sulfa Antibiotics Hives     With swelling       Current Outpatient Medications:     cholecalciferol (VITAMIN D3) 25411 units capsule, Take 1 capsule (10,000 Units total) by mouth daily, Disp: 90 capsule, Rfl: 0    Dapsone (ACZONE) 5 % topical gel, Bid, Disp: 90 g, Rfl: 0    ferrous sulfate 325 (65 Fe) mg tablet, Take 1 tablet (325 mg total) by mouth daily with breakfast, Disp: 90 tablet, Rfl: 0    hydrOXYzine pamoate (VISTARIL) 25 mg capsule, Take 1 capsule (25 mg total) by mouth 3 (three) times a day as needed for anxiety for up to 5 days, Disp: 15 capsule, Rfl: 0    lisinopril (ZESTRIL) 10 mg tablet, Take 1 tablet (10 mg total) by mouth daily, Disp: 90 tablet, Rfl: 0    naproxen (NAPROSYN) 500 mg tablet, Take 1 tablet (500 mg total) by mouth 2 (two) times a day with meals (Patient taking differently: Take 500 mg by mouth 2 (two) times a day with meals ), Disp: 60 tablet, Rfl: 0    oxyCODONE-acetaminophen (ENDOCET) 5-325 mg per tablet, Take 1 tablet by mouth every 4 (four) hours as needed for moderate painMax Daily Amount: 6 tablets, Disp: 120 tablet, Rfl: 0    traMADol (ULTRAM) 50 mg tablet, Take 1 tablet (50 mg total) by mouth every 6 (six) hours, Disp: 90 tablet, Rfl: 4    zolpidem (AMBIEN) 5 mg tablet, TAKE 1 TABLET(5 MG) BY MOUTH DAILY (Patient not taking: Reported on 3/26/2019), Disp: 60 tablet, Rfl: 0    Vitals: Blood pressure 108/78, pulse 75, height 5' 3" (1 6 m), weight 58 6 kg (129 lb 3 2 oz), last menstrual period 03/20/2019, SpO2 98 %, not currently breastfeeding  Body mass index is 22 89 kg/m²    Vitals:    03/26/19 1255   Weight: 58 6 kg (129 lb 3 2 oz)     BP Readings from Last 3 Encounters:   03/26/19 108/78   03/23/19 126/91   02/18/19 108/70         Physical Exam    Neurologic:  Alert & oriented x 3, no new focal deficits, Not in any acute distress,  Constitutional:  Well developed, well nourished, non-toxic appearance   Eyes:  Pupil equal and reacting to light, conjunctiva normal, No JVP, No LNP   HENT:  Atraumatic, oropharynx moist, Neck- normal range of motion, no tenderness,  Neck supple   Respiratory:  Bilateral air entry, mostly clear to auscultation  Cardiovascular: S1-S2 regular with a 2/6 ejection systolic murmur and S4 is present  GI:  Soft, nondistended, normal bowel sounds, nontender, no hepatosplenomegaly appreciated  Musculoskeletal:  No edema, no tenderness, no deformities  Skin:  Well hydrated, no rash   Lymphatic:  No lymphadenopathy noted   Extremities:  No edema and distal pulses are present    Diagnostic Studies Review Cardio:    None recent    EKG:     Initial EKG done on March 2019 shows sinus tachycardia heart rate 110 beats per minute  Nonspecific ST changes cannot rule out ischemia  QT was prolonged  Repeat EKG when her heart rate was 80 beats per minute shows normal sinus some ST segment depression is no longer seen  Imaging:  Chest X-Ray:   No Chest XR results available for this patient  CT-scan of the chest:    CTA of the chest done on 03/23/2019 shows moderate mild thoracic dextro scoliosis    Lab Review   Lab Results   Component Value Date    WBC 8 81 03/23/2019    HGB 11 6 03/23/2019    HCT 36 8 03/23/2019    MCV 89 03/23/2019    RDW 14 4 03/23/2019     03/23/2019     BMP:  Lab Results   Component Value Date    SODIUM 133 (L) 03/23/2019    K 3 6 03/23/2019    CL 97 (L) 03/23/2019    CO2 22 03/23/2019    BUN 22 03/23/2019    CREATININE 0 74 03/23/2019    GLUC 115 03/23/2019    GLUF 90 09/26/2018    CALCIUM 9 5 03/23/2019    EGFR 104 03/23/2019     LFT:  Lab Results   Component Value Date    AST 18 09/26/2018    ALT 22 09/26/2018    ALKPHOS 50 09/26/2018    TP 6 5 09/26/2018    ALB 3 5 09/26/2018      Lab Results   Component Value Date    [id number] 1 478 03/23/2019     No results found for: HGBA1C  Lipid Profile:   Lab Results   Component Value Date    CHOLESTEROL 157 09/26/2018    HDL 55 09/26/2018    LDLCALC 88 09/26/2018    TRIG 72 09/26/2018     Lab Results   Component Value Date    CHOLESTEROL 157 09/26/2018     Lab Results   Component Value Date    TROPONINI 0 02 03/23/2019     No results found for: NTBNP         Dr Leopoldo Boas, MD [facility] [place]      "This note has been constructed using a voice recognition system  Therefore there may be syntax, spelling, and/or grammatical errors   Please call if you have any questions  "

## 2019-03-28 ENCOUNTER — PROCEDURE VISIT (OUTPATIENT)
Dept: FAMILY MEDICINE CLINIC | Facility: CLINIC | Age: 39
End: 2019-03-28
Payer: COMMERCIAL

## 2019-03-28 VITALS — DIASTOLIC BLOOD PRESSURE: 80 MMHG | SYSTOLIC BLOOD PRESSURE: 112 MMHG | HEART RATE: 93 BPM | TEMPERATURE: 98.1 F

## 2019-03-28 DIAGNOSIS — M54.6 CHRONIC BILATERAL THORACIC BACK PAIN: ICD-10-CM

## 2019-03-28 DIAGNOSIS — F41.9 ANXIETY: Primary | ICD-10-CM

## 2019-03-28 DIAGNOSIS — G89.29 CHRONIC BILATERAL THORACIC BACK PAIN: ICD-10-CM

## 2019-03-28 DIAGNOSIS — M54.2 CERVICALGIA: ICD-10-CM

## 2019-03-28 PROCEDURE — 98925 OSTEOPATH MANJ 1-2 REGIONS: CPT | Performed by: FAMILY MEDICINE

## 2019-03-28 RX ORDER — HYDROXYZINE HYDROCHLORIDE 25 MG/1
25 TABLET, FILM COATED ORAL EVERY 6 HOURS PRN
Qty: 30 TABLET | Refills: 0 | Status: SHIPPED | OUTPATIENT
Start: 2019-03-28 | End: 2019-04-12 | Stop reason: SDUPTHER

## 2019-03-29 DIAGNOSIS — M54.50 CHRONIC BILATERAL LOW BACK PAIN WITHOUT SCIATICA: ICD-10-CM

## 2019-03-29 DIAGNOSIS — G89.29 CHRONIC BILATERAL LOW BACK PAIN WITHOUT SCIATICA: ICD-10-CM

## 2019-03-29 RX ORDER — OXYCODONE HYDROCHLORIDE AND ACETAMINOPHEN 5; 325 MG/1; MG/1
1 TABLET ORAL EVERY 4 HOURS PRN
Qty: 120 TABLET | Refills: 0 | Status: SHIPPED | OUTPATIENT
Start: 2019-03-29 | End: 2019-04-16 | Stop reason: SDUPTHER

## 2019-04-10 DIAGNOSIS — I10 ESSENTIAL HYPERTENSION: ICD-10-CM

## 2019-04-10 RX ORDER — LISINOPRIL 10 MG/1
TABLET ORAL
Qty: 90 TABLET | Refills: 0 | Status: SHIPPED | OUTPATIENT
Start: 2019-04-10 | End: 2019-05-17 | Stop reason: ALTCHOICE

## 2019-04-12 DIAGNOSIS — F41.9 ANXIETY: ICD-10-CM

## 2019-04-13 RX ORDER — HYDROXYZINE HYDROCHLORIDE 25 MG/1
TABLET, FILM COATED ORAL
Qty: 30 TABLET | Refills: 0 | Status: SHIPPED | OUTPATIENT
Start: 2019-04-13 | End: 2019-04-22 | Stop reason: SDUPTHER

## 2019-04-16 DIAGNOSIS — M54.50 CHRONIC BILATERAL LOW BACK PAIN WITHOUT SCIATICA: ICD-10-CM

## 2019-04-16 DIAGNOSIS — E55.9 VITAMIN D DEFICIENCY: Primary | ICD-10-CM

## 2019-04-16 DIAGNOSIS — G89.29 CHRONIC BILATERAL LOW BACK PAIN WITHOUT SCIATICA: ICD-10-CM

## 2019-04-16 RX ORDER — OXYCODONE HYDROCHLORIDE AND ACETAMINOPHEN 5; 325 MG/1; MG/1
1 TABLET ORAL EVERY 4 HOURS PRN
Qty: 120 TABLET | Refills: 0 | Status: SHIPPED | OUTPATIENT
Start: 2019-04-16 | End: 2019-05-09 | Stop reason: SDUPTHER

## 2019-04-18 ENCOUNTER — TRANSCRIBE ORDERS (OUTPATIENT)
Dept: ADMINISTRATIVE | Facility: HOSPITAL | Age: 39
End: 2019-04-18

## 2019-04-18 ENCOUNTER — APPOINTMENT (OUTPATIENT)
Dept: NON INVASIVE DIAGNOSTICS | Facility: HOSPITAL | Age: 39
End: 2019-04-18
Attending: INTERNAL MEDICINE
Payer: COMMERCIAL

## 2019-04-18 ENCOUNTER — HOSPITAL ENCOUNTER (OUTPATIENT)
Dept: NON INVASIVE DIAGNOSTICS | Facility: HOSPITAL | Age: 39
Discharge: HOME | End: 2019-04-18
Attending: INTERNAL MEDICINE
Payer: COMMERCIAL

## 2019-04-18 DIAGNOSIS — I10 ESSENTIAL HYPERTENSION: ICD-10-CM

## 2019-04-18 DIAGNOSIS — R94.31 ABNORMAL EKG: ICD-10-CM

## 2019-04-18 DIAGNOSIS — R00.0 TACHYCARDIA: ICD-10-CM

## 2019-04-18 DIAGNOSIS — R07.9 CHEST PAIN, UNSPECIFIED TYPE: ICD-10-CM

## 2019-04-18 PROCEDURE — 93306 TTE W/DOPPLER COMPLETE: CPT

## 2019-04-20 PROCEDURE — 93306 TTE W/DOPPLER COMPLETE: CPT | Performed by: INTERNAL MEDICINE

## 2019-04-22 ENCOUNTER — TELEPHONE (OUTPATIENT)
Dept: CARDIOLOGY CLINIC | Facility: CLINIC | Age: 39
End: 2019-04-22

## 2019-04-22 DIAGNOSIS — F41.9 ANXIETY: ICD-10-CM

## 2019-04-22 RX ORDER — ERGOCALCIFEROL 1.25 MG/1
CAPSULE ORAL
Qty: 8 CAPSULE | Refills: 0 | Status: SHIPPED | OUTPATIENT
Start: 2019-04-22 | End: 2019-06-17 | Stop reason: SDUPTHER

## 2019-04-22 RX ORDER — HYDROXYZINE HYDROCHLORIDE 25 MG/1
25 TABLET, FILM COATED ORAL EVERY 6 HOURS PRN
Qty: 60 TABLET | Refills: 0 | Status: SHIPPED | OUTPATIENT
Start: 2019-04-22 | End: 2019-04-23 | Stop reason: SDUPTHER

## 2019-04-23 ENCOUNTER — PROCEDURE VISIT (OUTPATIENT)
Dept: FAMILY MEDICINE CLINIC | Facility: CLINIC | Age: 39
End: 2019-04-23
Payer: COMMERCIAL

## 2019-04-23 VITALS
TEMPERATURE: 98 F | OXYGEN SATURATION: 97 % | WEIGHT: 135.06 LBS | BODY MASS INDEX: 23.93 KG/M2 | DIASTOLIC BLOOD PRESSURE: 68 MMHG | HEART RATE: 99 BPM | SYSTOLIC BLOOD PRESSURE: 110 MMHG

## 2019-04-23 DIAGNOSIS — M54.6 CHRONIC BILATERAL THORACIC BACK PAIN: Primary | ICD-10-CM

## 2019-04-23 DIAGNOSIS — G89.29 CHRONIC BILATERAL THORACIC BACK PAIN: Primary | ICD-10-CM

## 2019-04-23 DIAGNOSIS — F41.9 ANXIETY: ICD-10-CM

## 2019-04-23 DIAGNOSIS — L70.0 ACNE VULGARIS: ICD-10-CM

## 2019-04-23 PROCEDURE — 98925 OSTEOPATH MANJ 1-2 REGIONS: CPT | Performed by: FAMILY MEDICINE

## 2019-04-24 ENCOUNTER — HOSPITAL ENCOUNTER (OUTPATIENT)
Dept: NON INVASIVE DIAGNOSTICS | Facility: HOSPITAL | Age: 39
Discharge: HOME | End: 2019-04-24
Attending: INTERNAL MEDICINE

## 2019-04-24 RX ORDER — DAPSONE 50 MG/G
GEL TOPICAL
Qty: 90 G | Refills: 0 | Status: SHIPPED | OUTPATIENT
Start: 2019-04-24 | End: 2019-05-09 | Stop reason: SDUPTHER

## 2019-04-24 RX ORDER — HYDROXYZINE HYDROCHLORIDE 25 MG/1
TABLET, FILM COATED ORAL
Qty: 60 TABLET | Refills: 0 | Status: SHIPPED | OUTPATIENT
Start: 2019-04-24 | End: 2019-05-17 | Stop reason: SDUPTHER

## 2019-04-26 ENCOUNTER — HOSPITAL ENCOUNTER (OUTPATIENT)
Dept: NON INVASIVE DIAGNOSTICS | Facility: HOSPITAL | Age: 39
Discharge: HOME | End: 2019-04-26
Attending: INTERNAL MEDICINE
Payer: COMMERCIAL

## 2019-04-26 DIAGNOSIS — R07.9 CHEST PAIN, UNSPECIFIED TYPE: ICD-10-CM

## 2019-04-26 DIAGNOSIS — F41.0 PANIC ATTACKS: ICD-10-CM

## 2019-04-26 DIAGNOSIS — R00.0 TACHYCARDIA: ICD-10-CM

## 2019-04-26 PROCEDURE — 93226 XTRNL ECG REC<48 HR SCAN A/R: CPT

## 2019-04-26 PROCEDURE — 93225 XTRNL ECG REC<48 HRS REC: CPT

## 2019-05-06 PROCEDURE — 93227 XTRNL ECG REC<48 HR R&I: CPT | Performed by: INTERNAL MEDICINE

## 2019-05-07 ENCOUNTER — TELEPHONE (OUTPATIENT)
Dept: CARDIOLOGY CLINIC | Facility: CLINIC | Age: 39
End: 2019-05-07

## 2019-05-08 ENCOUNTER — OFFICE VISIT (OUTPATIENT)
Dept: FAMILY MEDICINE CLINIC | Facility: CLINIC | Age: 39
End: 2019-05-08
Payer: COMMERCIAL

## 2019-05-08 VITALS
WEIGHT: 132 LBS | DIASTOLIC BLOOD PRESSURE: 80 MMHG | RESPIRATION RATE: 18 BRPM | OXYGEN SATURATION: 99 % | SYSTOLIC BLOOD PRESSURE: 118 MMHG | HEART RATE: 95 BPM | TEMPERATURE: 98.8 F | BODY MASS INDEX: 23.38 KG/M2

## 2019-05-08 DIAGNOSIS — G89.29 CHRONIC BILATERAL THORACIC BACK PAIN: ICD-10-CM

## 2019-05-08 DIAGNOSIS — M54.6 CHRONIC BILATERAL THORACIC BACK PAIN: ICD-10-CM

## 2019-05-08 DIAGNOSIS — F51.01 PRIMARY INSOMNIA: ICD-10-CM

## 2019-05-08 DIAGNOSIS — M54.2 CERVICALGIA: Primary | ICD-10-CM

## 2019-05-08 DIAGNOSIS — L70.0 ACNE VULGARIS: ICD-10-CM

## 2019-05-08 PROCEDURE — 98925 OSTEOPATH MANJ 1-2 REGIONS: CPT | Performed by: FAMILY MEDICINE

## 2019-05-09 DIAGNOSIS — L70.0 ACNE VULGARIS: ICD-10-CM

## 2019-05-09 DIAGNOSIS — M54.50 CHRONIC BILATERAL LOW BACK PAIN WITHOUT SCIATICA: ICD-10-CM

## 2019-05-09 DIAGNOSIS — F51.01 PRIMARY INSOMNIA: ICD-10-CM

## 2019-05-09 DIAGNOSIS — G89.29 CHRONIC BILATERAL LOW BACK PAIN WITHOUT SCIATICA: ICD-10-CM

## 2019-05-09 RX ORDER — ZOLPIDEM TARTRATE 5 MG/1
5 TABLET ORAL DAILY
Qty: 60 TABLET | Refills: 0 | OUTPATIENT
Start: 2019-05-09

## 2019-05-09 RX ORDER — DAPSONE 50 MG/G
GEL TOPICAL
Qty: 90 G | Refills: 0 | OUTPATIENT
Start: 2019-05-09

## 2019-05-09 RX ORDER — OXYCODONE HYDROCHLORIDE AND ACETAMINOPHEN 5; 325 MG/1; MG/1
1 TABLET ORAL EVERY 4 HOURS PRN
Qty: 120 TABLET | Refills: 0 | Status: SHIPPED | OUTPATIENT
Start: 2019-05-09 | End: 2019-05-29 | Stop reason: SDUPTHER

## 2019-05-09 RX ORDER — ZOLPIDEM TARTRATE 5 MG/1
TABLET ORAL
Qty: 60 TABLET | Refills: 0 | OUTPATIENT
Start: 2019-05-09

## 2019-05-09 RX ORDER — ZOLPIDEM TARTRATE 5 MG/1
5 TABLET ORAL DAILY
Qty: 60 TABLET | Refills: 0 | Status: SHIPPED | OUTPATIENT
Start: 2019-05-09 | End: 2019-07-02 | Stop reason: SDUPTHER

## 2019-05-09 RX ORDER — DAPSONE 50 MG/G
GEL TOPICAL
Qty: 90 G | Refills: 0 | Status: SHIPPED | OUTPATIENT
Start: 2019-05-09 | End: 2019-08-16 | Stop reason: SDUPTHER

## 2019-05-14 ENCOUNTER — TELEPHONE (OUTPATIENT)
Dept: FAMILY MEDICINE CLINIC | Facility: CLINIC | Age: 39
End: 2019-05-14

## 2019-05-15 ENCOUNTER — HOSPITAL ENCOUNTER (OUTPATIENT)
Dept: NON INVASIVE DIAGNOSTICS | Facility: HOSPITAL | Age: 39
Discharge: HOME | End: 2019-05-15
Attending: INTERNAL MEDICINE
Payer: COMMERCIAL

## 2019-05-15 DIAGNOSIS — R94.31 ABNORMAL EKG: ICD-10-CM

## 2019-05-15 DIAGNOSIS — R00.0 TACHYCARDIA: ICD-10-CM

## 2019-05-15 DIAGNOSIS — I10 ESSENTIAL HYPERTENSION: ICD-10-CM

## 2019-05-15 DIAGNOSIS — R07.9 CHEST PAIN, UNSPECIFIED TYPE: ICD-10-CM

## 2019-05-15 LAB
CHEST PAIN STATEMENT: NORMAL
MAX DIASTOLIC BP: 72 MMHG
MAX HEART RATE: 184 BPM
MAX PREDICTED HEART RATE: 183 BPM
MAX. SYSTOLIC BP: 154 MMHG
PROTOCOL NAME: NORMAL
TARGET HR FORMULA: NORMAL
TEST INDICATION: NORMAL
TIME IN EXERCISE PHASE: NORMAL

## 2019-05-15 PROCEDURE — 93350 STRESS TTE ONLY: CPT

## 2019-05-16 PROCEDURE — 93351 STRESS TTE COMPLETE: CPT | Performed by: INTERNAL MEDICINE

## 2019-05-17 ENCOUNTER — OFFICE VISIT (OUTPATIENT)
Dept: FAMILY MEDICINE CLINIC | Facility: CLINIC | Age: 39
End: 2019-05-17
Payer: COMMERCIAL

## 2019-05-17 ENCOUNTER — TELEPHONE (OUTPATIENT)
Dept: CARDIOLOGY CLINIC | Facility: CLINIC | Age: 39
End: 2019-05-17

## 2019-05-17 VITALS
HEART RATE: 81 BPM | BODY MASS INDEX: 23.03 KG/M2 | SYSTOLIC BLOOD PRESSURE: 126 MMHG | RESPIRATION RATE: 18 BRPM | TEMPERATURE: 99 F | DIASTOLIC BLOOD PRESSURE: 82 MMHG | WEIGHT: 130 LBS | OXYGEN SATURATION: 99 %

## 2019-05-17 DIAGNOSIS — M54.50 CHRONIC BILATERAL LOW BACK PAIN WITHOUT SCIATICA: ICD-10-CM

## 2019-05-17 DIAGNOSIS — M54.2 CERVICALGIA: ICD-10-CM

## 2019-05-17 DIAGNOSIS — M54.6 CHRONIC BILATERAL THORACIC BACK PAIN: Primary | ICD-10-CM

## 2019-05-17 DIAGNOSIS — I10 ESSENTIAL HYPERTENSION: ICD-10-CM

## 2019-05-17 DIAGNOSIS — F41.9 ANXIETY: ICD-10-CM

## 2019-05-17 DIAGNOSIS — I10 ESSENTIAL HYPERTENSION: Primary | ICD-10-CM

## 2019-05-17 DIAGNOSIS — G89.29 CHRONIC BILATERAL THORACIC BACK PAIN: Primary | ICD-10-CM

## 2019-05-17 DIAGNOSIS — R00.0 TACHYCARDIA: ICD-10-CM

## 2019-05-17 DIAGNOSIS — G89.29 CHRONIC BILATERAL LOW BACK PAIN WITHOUT SCIATICA: ICD-10-CM

## 2019-05-17 PROCEDURE — 99213 OFFICE O/P EST LOW 20 MIN: CPT | Performed by: FAMILY MEDICINE

## 2019-05-17 RX ORDER — TRAMADOL HYDROCHLORIDE 50 MG/1
50 TABLET ORAL EVERY 6 HOURS PRN
Qty: 90 TABLET | Refills: 0 | Status: SHIPPED | OUTPATIENT
Start: 2019-05-17 | End: 2019-06-05 | Stop reason: SDUPTHER

## 2019-05-17 RX ORDER — LISINOPRIL 5 MG/1
5 TABLET ORAL DAILY
Qty: 30 TABLET | Refills: 5 | Status: SHIPPED | OUTPATIENT
Start: 2019-05-17 | End: 2019-06-17

## 2019-05-17 RX ORDER — CARVEDILOL 3.12 MG/1
3.12 TABLET ORAL 2 TIMES DAILY WITH MEALS
Qty: 60 TABLET | Refills: 11 | Status: SHIPPED | OUTPATIENT
Start: 2019-05-17 | End: 2019-06-17 | Stop reason: SDUPTHER

## 2019-05-17 RX ORDER — NAPROXEN 500 MG/1
500 TABLET ORAL 2 TIMES DAILY WITH MEALS
Qty: 60 TABLET | Refills: 0 | Status: SHIPPED | OUTPATIENT
Start: 2019-05-17 | End: 2019-06-17 | Stop reason: SDUPTHER

## 2019-05-17 RX ORDER — HYDROXYZINE HYDROCHLORIDE 25 MG/1
25 TABLET, FILM COATED ORAL EVERY 6 HOURS PRN
Qty: 60 TABLET | Refills: 3 | OUTPATIENT
Start: 2019-05-17 | End: 2019-06-17

## 2019-05-19 RX ORDER — HYDROXYZINE HYDROCHLORIDE 25 MG/1
TABLET, FILM COATED ORAL
Qty: 30 TABLET | Refills: 0 | OUTPATIENT
Start: 2019-05-19

## 2019-05-19 RX ORDER — TRAMADOL HYDROCHLORIDE 50 MG/1
TABLET ORAL
Qty: 90 TABLET | Refills: 0 | OUTPATIENT
Start: 2019-05-19

## 2019-05-21 ENCOUNTER — TELEPHONE (OUTPATIENT)
Dept: FAMILY MEDICINE CLINIC | Facility: CLINIC | Age: 39
End: 2019-05-21

## 2019-05-29 DIAGNOSIS — G89.29 CHRONIC BILATERAL LOW BACK PAIN WITHOUT SCIATICA: ICD-10-CM

## 2019-05-29 DIAGNOSIS — M54.50 CHRONIC BILATERAL LOW BACK PAIN WITHOUT SCIATICA: ICD-10-CM

## 2019-05-29 RX ORDER — OXYCODONE HYDROCHLORIDE AND ACETAMINOPHEN 5; 325 MG/1; MG/1
1 TABLET ORAL EVERY 4 HOURS PRN
Qty: 120 TABLET | Refills: 0 | Status: SHIPPED | OUTPATIENT
Start: 2019-05-29 | End: 2019-06-17 | Stop reason: SDUPTHER

## 2019-05-31 NOTE — PROGRESS NOTES
Assessment/Plan:    No problem-specific Assessment & Plan notes found for this encounter  There are no diagnoses linked to this encounter  Subjective:      Patient ID: Jamison Carlin is a 42 y o  female      Here for her regular omt tx  Back pain is not bad today  No radiculopathy        The following portions of the patient's history were reviewed and updated as appropriate: current medications, past family history, past medical history, past social history, past surgical history and problem list     Review of Systems      Objective:      /80 (BP Location: Left arm, Patient Position: Sitting, Cuff Size: Adult)   Pulse 95   Temp 98 8 °F (37 1 °C) (Tympanic)   Resp 18   Wt 59 9 kg (132 lb)   LMP 04/19/2019   SpO2 99%   BMI 23 38 kg/m²          Physical Exam          Group curve t3-5 rlsl  Slightly corrected with HVLA  Tolerated procedure well

## 2019-06-05 DIAGNOSIS — M54.2 CERVICALGIA: ICD-10-CM

## 2019-06-06 DIAGNOSIS — M54.2 CERVICALGIA: ICD-10-CM

## 2019-06-06 DIAGNOSIS — F41.9 ANXIETY: ICD-10-CM

## 2019-06-06 RX ORDER — TRAMADOL HYDROCHLORIDE 50 MG/1
50 TABLET ORAL EVERY 6 HOURS PRN
Qty: 90 TABLET | Refills: 0 | OUTPATIENT
Start: 2019-06-06

## 2019-06-06 RX ORDER — HYDROXYZINE HYDROCHLORIDE 25 MG/1
TABLET, FILM COATED ORAL
Qty: 60 TABLET | Refills: 0 | Status: SHIPPED | OUTPATIENT
Start: 2019-06-06 | End: 2019-06-18 | Stop reason: SDUPTHER

## 2019-06-06 RX ORDER — TRAMADOL HYDROCHLORIDE 50 MG/1
TABLET ORAL
Qty: 90 TABLET | Refills: 0 | Status: SHIPPED | OUTPATIENT
Start: 2019-06-06 | End: 2019-06-28 | Stop reason: SDUPTHER

## 2019-06-06 NOTE — TELEPHONE ENCOUNTER
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

## 2019-06-17 ENCOUNTER — OFFICE VISIT (OUTPATIENT)
Dept: CARDIOLOGY CLINIC | Facility: CLINIC | Age: 39
End: 2019-06-17
Payer: COMMERCIAL

## 2019-06-17 VITALS
DIASTOLIC BLOOD PRESSURE: 76 MMHG | OXYGEN SATURATION: 98 % | SYSTOLIC BLOOD PRESSURE: 120 MMHG | WEIGHT: 139.7 LBS | HEIGHT: 63 IN | BODY MASS INDEX: 24.75 KG/M2 | HEART RATE: 79 BPM

## 2019-06-17 DIAGNOSIS — R00.2 PALPITATION: ICD-10-CM

## 2019-06-17 DIAGNOSIS — M41.9 SCOLIOSIS OF THORACIC SPINE, UNSPECIFIED SCOLIOSIS TYPE: ICD-10-CM

## 2019-06-17 DIAGNOSIS — M54.50 CHRONIC BILATERAL LOW BACK PAIN WITHOUT SCIATICA: ICD-10-CM

## 2019-06-17 DIAGNOSIS — G89.29 CHRONIC BILATERAL LOW BACK PAIN WITHOUT SCIATICA: ICD-10-CM

## 2019-06-17 DIAGNOSIS — F41.0 PANIC ATTACKS: ICD-10-CM

## 2019-06-17 DIAGNOSIS — E55.9 VITAMIN D DEFICIENCY: ICD-10-CM

## 2019-06-17 DIAGNOSIS — R07.9 CHEST PAIN, UNSPECIFIED TYPE: ICD-10-CM

## 2019-06-17 DIAGNOSIS — I10 ESSENTIAL HYPERTENSION: ICD-10-CM

## 2019-06-17 DIAGNOSIS — R00.0 TACHYCARDIA: ICD-10-CM

## 2019-06-17 PROCEDURE — 99214 OFFICE O/P EST MOD 30 MIN: CPT | Performed by: INTERNAL MEDICINE

## 2019-06-17 PROCEDURE — 93000 ELECTROCARDIOGRAM COMPLETE: CPT | Performed by: INTERNAL MEDICINE

## 2019-06-17 RX ORDER — OXYCODONE HYDROCHLORIDE AND ACETAMINOPHEN 5; 325 MG/1; MG/1
1 TABLET ORAL EVERY 4 HOURS PRN
Qty: 120 TABLET | Refills: 0 | Status: SHIPPED | OUTPATIENT
Start: 2019-06-17 | End: 2019-07-08 | Stop reason: SDUPTHER

## 2019-06-17 RX ORDER — CARVEDILOL 6.25 MG/1
6.25 TABLET ORAL 2 TIMES DAILY WITH MEALS
Qty: 60 TABLET | Refills: 5 | Status: SHIPPED | OUTPATIENT
Start: 2019-06-17 | End: 2019-09-04

## 2019-06-17 RX ORDER — ERGOCALCIFEROL 1.25 MG/1
50000 CAPSULE ORAL WEEKLY
Qty: 8 CAPSULE | Refills: 2 | Status: SHIPPED | OUTPATIENT
Start: 2019-06-17 | End: 2019-10-09 | Stop reason: SDUPTHER

## 2019-06-17 RX ORDER — NAPROXEN 500 MG/1
500 TABLET ORAL 2 TIMES DAILY WITH MEALS
Qty: 60 TABLET | Refills: 5 | Status: SHIPPED | OUTPATIENT
Start: 2019-06-17 | End: 2019-09-10

## 2019-06-17 RX ORDER — OXYCODONE HYDROCHLORIDE AND ACETAMINOPHEN 5; 325 MG/1; MG/1
1 TABLET ORAL EVERY 4 HOURS PRN
Qty: 120 TABLET | Refills: 0 | OUTPATIENT
Start: 2019-06-17

## 2019-06-18 DIAGNOSIS — F41.9 ANXIETY: ICD-10-CM

## 2019-06-20 RX ORDER — HYDROXYZINE HYDROCHLORIDE 25 MG/1
TABLET, FILM COATED ORAL
Qty: 60 TABLET | Refills: 0 | Status: SHIPPED | OUTPATIENT
Start: 2019-06-20 | End: 2019-07-17 | Stop reason: SDUPTHER

## 2019-06-26 DIAGNOSIS — M54.2 CERVICALGIA: ICD-10-CM

## 2019-06-26 RX ORDER — NAPROXEN 500 MG/1
TABLET ORAL
Qty: 60 TABLET | Refills: 0 | Status: SHIPPED | OUTPATIENT
Start: 2019-06-26 | End: 2019-09-10

## 2019-06-26 RX ORDER — ERGOCALCIFEROL 1.25 MG/1
CAPSULE ORAL
Qty: 8 CAPSULE | Refills: 0 | Status: SHIPPED | OUTPATIENT
Start: 2019-06-26 | End: 2019-08-21

## 2019-06-28 DIAGNOSIS — M54.2 CERVICALGIA: ICD-10-CM

## 2019-06-28 RX ORDER — TRAMADOL HYDROCHLORIDE 50 MG/1
50 TABLET ORAL EVERY 6 HOURS PRN
Qty: 90 TABLET | Refills: 0 | Status: SHIPPED | OUTPATIENT
Start: 2019-06-28 | End: 2019-09-11

## 2019-07-02 DIAGNOSIS — G89.29 CHRONIC BILATERAL LOW BACK PAIN WITHOUT SCIATICA: ICD-10-CM

## 2019-07-02 DIAGNOSIS — M54.50 CHRONIC BILATERAL LOW BACK PAIN WITHOUT SCIATICA: ICD-10-CM

## 2019-07-02 DIAGNOSIS — F51.01 PRIMARY INSOMNIA: ICD-10-CM

## 2019-07-08 RX ORDER — ZOLPIDEM TARTRATE 5 MG/1
TABLET ORAL
Qty: 60 TABLET | Refills: 0 | Status: SHIPPED | OUTPATIENT
Start: 2019-07-08 | End: 2019-08-27 | Stop reason: SDUPTHER

## 2019-07-08 RX ORDER — OXYCODONE HYDROCHLORIDE AND ACETAMINOPHEN 5; 325 MG/1; MG/1
1 TABLET ORAL EVERY 4 HOURS PRN
Qty: 120 TABLET | Refills: 0 | Status: SHIPPED | OUTPATIENT
Start: 2019-07-08 | End: 2019-07-26 | Stop reason: SDUPTHER

## 2019-07-09 RX ORDER — TRAMADOL HYDROCHLORIDE 50 MG/1
50 TABLET ORAL EVERY 6 HOURS PRN
Qty: 90 TABLET | Refills: 0 | Status: SHIPPED | OUTPATIENT
Start: 2019-07-09 | End: 2019-08-04 | Stop reason: SDUPTHER

## 2019-07-09 RX ORDER — TRAMADOL HYDROCHLORIDE 50 MG/1
TABLET ORAL
Qty: 90 TABLET | Refills: 0 | OUTPATIENT
Start: 2019-07-09

## 2019-07-10 RX ORDER — CARVEDILOL 3.12 MG/1
TABLET ORAL
COMMUNITY
Start: 2019-07-02 | End: 2019-09-04

## 2019-07-17 DIAGNOSIS — F41.9 ANXIETY: ICD-10-CM

## 2019-07-19 RX ORDER — HYDROXYZINE HYDROCHLORIDE 25 MG/1
25 TABLET, FILM COATED ORAL EVERY 6 HOURS PRN
Qty: 60 TABLET | Refills: 4 | Status: SHIPPED | OUTPATIENT
Start: 2019-07-19 | End: 2019-09-11

## 2019-07-26 DIAGNOSIS — M54.50 CHRONIC BILATERAL LOW BACK PAIN WITHOUT SCIATICA: ICD-10-CM

## 2019-07-26 DIAGNOSIS — G89.29 CHRONIC BILATERAL LOW BACK PAIN WITHOUT SCIATICA: ICD-10-CM

## 2019-07-26 DIAGNOSIS — I10 ESSENTIAL HYPERTENSION: Primary | ICD-10-CM

## 2019-07-26 DIAGNOSIS — D50.8 IRON DEFICIENCY ANEMIA SECONDARY TO INADEQUATE DIETARY IRON INTAKE: ICD-10-CM

## 2019-07-26 DIAGNOSIS — Z00.00 PHYSICAL EXAM: ICD-10-CM

## 2019-07-26 RX ORDER — OXYCODONE HYDROCHLORIDE AND ACETAMINOPHEN 5; 325 MG/1; MG/1
1 TABLET ORAL EVERY 4 HOURS PRN
Qty: 120 TABLET | Refills: 0 | Status: SHIPPED | OUTPATIENT
Start: 2019-07-26 | End: 2019-08-16 | Stop reason: SDUPTHER

## 2019-07-27 ENCOUNTER — APPOINTMENT (OUTPATIENT)
Dept: LAB | Age: 39
End: 2019-07-27
Attending: PREVENTIVE MEDICINE

## 2019-07-27 ENCOUNTER — TRANSCRIBE ORDERS (OUTPATIENT)
Dept: ADMINISTRATIVE | Age: 39
End: 2019-07-27

## 2019-07-27 DIAGNOSIS — Z02.1 PHYSICAL EXAM, PRE-EMPLOYMENT: Primary | ICD-10-CM

## 2019-07-27 DIAGNOSIS — Z02.1 PHYSICAL EXAM, PRE-EMPLOYMENT: ICD-10-CM

## 2019-07-27 LAB — RUBV IGG SERPL IA-ACNC: 8 IU/ML

## 2019-07-27 PROCEDURE — 86480 TB TEST CELL IMMUN MEASURE: CPT

## 2019-07-27 PROCEDURE — 86762 RUBELLA ANTIBODY: CPT

## 2019-07-27 PROCEDURE — 86735 MUMPS ANTIBODY: CPT

## 2019-07-27 PROCEDURE — 86765 RUBEOLA ANTIBODY: CPT

## 2019-07-27 PROCEDURE — 86787 VARICELLA-ZOSTER ANTIBODY: CPT

## 2019-07-27 PROCEDURE — 36415 COLL VENOUS BLD VENIPUNCTURE: CPT

## 2019-07-28 LAB — VZV IGG SER IA-ACNC: NORMAL

## 2019-07-29 LAB
GAMMA INTERFERON BACKGROUND BLD IA-ACNC: 0.03 IU/ML
M TB IFN-G BLD-IMP: NEGATIVE
M TB IFN-G CD4+ BCKGRND COR BLD-ACNC: 0 IU/ML
M TB IFN-G CD4+ BCKGRND COR BLD-ACNC: 0 IU/ML
MITOGEN IGNF BCKGRD COR BLD-ACNC: >10 IU/ML

## 2019-07-30 LAB
MEV IGG SER QL: NORMAL
MUV IGG SER QL: NORMAL

## 2019-07-31 ENCOUNTER — TELEPHONE (OUTPATIENT)
Dept: FAMILY MEDICINE CLINIC | Facility: CLINIC | Age: 39
End: 2019-07-31

## 2019-08-04 DIAGNOSIS — M54.2 CERVICALGIA: ICD-10-CM

## 2019-08-05 RX ORDER — TRAMADOL HYDROCHLORIDE 50 MG/1
TABLET ORAL
Qty: 90 TABLET | Refills: 0 | Status: SHIPPED | OUTPATIENT
Start: 2019-08-05 | End: 2019-08-21

## 2019-08-16 DIAGNOSIS — L70.0 ACNE VULGARIS: ICD-10-CM

## 2019-08-16 DIAGNOSIS — G89.29 CHRONIC BILATERAL LOW BACK PAIN WITHOUT SCIATICA: ICD-10-CM

## 2019-08-16 DIAGNOSIS — M54.50 CHRONIC BILATERAL LOW BACK PAIN WITHOUT SCIATICA: ICD-10-CM

## 2019-08-17 RX ORDER — OXYCODONE HYDROCHLORIDE AND ACETAMINOPHEN 5; 325 MG/1; MG/1
1 TABLET ORAL EVERY 4 HOURS PRN
Qty: 120 TABLET | Refills: 0 | Status: SHIPPED | OUTPATIENT
Start: 2019-08-17 | End: 2019-09-11

## 2019-08-17 RX ORDER — DAPSONE 50 MG/G
GEL TOPICAL
Qty: 90 G | Refills: 0 | Status: SHIPPED | OUTPATIENT
Start: 2019-08-17 | End: 2019-10-08 | Stop reason: SDUPTHER

## 2019-08-20 RX ORDER — LISINOPRIL 5 MG/1
TABLET ORAL
COMMUNITY
Start: 2019-08-11 | End: 2019-09-11

## 2019-08-21 ENCOUNTER — OFFICE VISIT (OUTPATIENT)
Dept: FAMILY MEDICINE CLINIC | Facility: CLINIC | Age: 39
End: 2019-08-21
Payer: COMMERCIAL

## 2019-08-21 VITALS
TEMPERATURE: 98.6 F | HEART RATE: 100 BPM | WEIGHT: 137 LBS | OXYGEN SATURATION: 99 % | DIASTOLIC BLOOD PRESSURE: 82 MMHG | SYSTOLIC BLOOD PRESSURE: 124 MMHG | BODY MASS INDEX: 24.27 KG/M2 | RESPIRATION RATE: 18 BRPM

## 2019-08-21 DIAGNOSIS — M54.50 CHRONIC BILATERAL LOW BACK PAIN WITHOUT SCIATICA: ICD-10-CM

## 2019-08-21 DIAGNOSIS — G89.29 CHRONIC BILATERAL LOW BACK PAIN WITHOUT SCIATICA: ICD-10-CM

## 2019-08-21 DIAGNOSIS — G89.29 CHRONIC BILATERAL THORACIC BACK PAIN: ICD-10-CM

## 2019-08-21 DIAGNOSIS — F11.20 OPIOID TYPE DEPENDENCE, CONTINUOUS (HCC): Primary | ICD-10-CM

## 2019-08-21 DIAGNOSIS — M54.6 CHRONIC BILATERAL THORACIC BACK PAIN: ICD-10-CM

## 2019-08-21 PROCEDURE — 99213 OFFICE O/P EST LOW 20 MIN: CPT | Performed by: FAMILY MEDICINE

## 2019-08-21 NOTE — PROGRESS NOTES
Assessment/Plan:    No problem-specific Assessment & Plan notes found for this encounter  Diagnoses and all orders for this visit:    Opioid type dependence, continuous ([address] )  -     Pain Management, Profile 6 With Confirmation    Chronic bilateral low back pain without sciatica  -     Pain Management, Profile 6 With Confirmation    Chronic bilateral thoracic back pain  -     Pain Management, Profile 6 With Confirmation          Subjective:      Patient ID: Aram Shanks is a 42 y o  female      Ramluana Alicia was originally here for physical but she found out yesterday that she was pregnant approximately 6 weeks so she is here to discuss her medications and what is safe to continue to take    This is not exactly an intentional pregnancy they were being careful but obviously she said it happened    Percocet-she is currently minimizing the use of it anyway as she did have a suspicion last week of pregnancy we did discuss that it does not have fetal anomalies related with it but it would have to be weaned down if she is taking it for any consistency prior to pregnancy due to respiratory   suppression of the  and possible addiction    Tramadol-same thing as recommended Percocet    Hydroxyzine same thing as recommended for the above two    Dapsone gel-there is no particular evidence based on topical and amounts on oral would require 250 times what was studied in rats    Coreg-that was given by her cardiologist  that it is considered safe in pregnancy I told her that I believed the preferred is labetalol but again it is considered to be the major control of hypertension in pregnancy we did look that up as well as an it does not seem to have any concerns with fetus but not Well studied    Zolpidem the same as the 1st 3    Iron supplements are not of a concern as we do often supplement with iron during pregnancy  Vitamin-D is not a concern as well      Lisinopril and Naprosyn were told to stay top immediately    She has been taking them inconsistently as well due to her suspicion  She did call her obstetrician's office yesterday to make an appointment the front staff person told her to continue with her meds until she speaks to her primary care    Sources for this were [employer]  We will arrange a physical for her next week and check her blood pressure same time    Her appointment with her Ob is on September 5th and I told her that she can discuss the other medications with her Ob but to stop lisinopril and Naprosyn immediately    She is very tearful as her 1st pregnancy was an abruption due to uncontrolled BP  Delivered at [facility]  Perinatology wanted her admitted at 35 week and delivered at 39  Her private ob overrode and sent her home  She was never on any meds            The following portions of the patient's history were reviewed and updated as appropriate: current medications, past family history, past medical history, past social history, past surgical history and problem list     Review of Systems      Objective:      /82   Pulse 100   Temp 98 6 °F (37 °C)   Resp 18   Wt 62 1 kg (137 lb)   SpO2 99%   BMI 24 27 kg/m²          Physical Exam

## 2019-08-27 DIAGNOSIS — M54.2 CERVICALGIA: ICD-10-CM

## 2019-08-27 DIAGNOSIS — F51.01 PRIMARY INSOMNIA: ICD-10-CM

## 2019-08-27 RX ORDER — TRAMADOL HYDROCHLORIDE 50 MG/1
50 TABLET ORAL EVERY 8 HOURS PRN
Qty: 90 TABLET | Refills: 0 | Status: SHIPPED | OUTPATIENT
Start: 2019-08-27 | End: 2019-09-18 | Stop reason: SDUPTHER

## 2019-08-27 RX ORDER — ZOLPIDEM TARTRATE 5 MG/1
5 TABLET ORAL DAILY
Qty: 60 TABLET | Refills: 0 | Status: SHIPPED | OUTPATIENT
Start: 2019-08-27 | End: 2019-09-11

## 2019-08-27 NOTE — TELEPHONE ENCOUNTER
Pt is calling about zolpidem and asking if she can cut it in half and try taking 2 5mg instead of 5mg when needed   Would like a call back from a nurse or dr Avinash Pisano

## 2019-08-29 ENCOUNTER — ULTRASOUND (OUTPATIENT)
Dept: OBGYN CLINIC | Facility: CLINIC | Age: 39
End: 2019-08-29

## 2019-08-29 VITALS
SYSTOLIC BLOOD PRESSURE: 122 MMHG | WEIGHT: 135.4 LBS | DIASTOLIC BLOOD PRESSURE: 80 MMHG | BODY MASS INDEX: 23.99 KG/M2 | HEIGHT: 63 IN

## 2019-08-29 DIAGNOSIS — Z34.81 PRENATAL CARE, SUBSEQUENT PREGNANCY, FIRST TRIMESTER: Primary | ICD-10-CM

## 2019-08-29 PROCEDURE — PNV: Performed by: NURSE PRACTITIONER

## 2019-08-29 NOTE — PROGRESS NOTES
Viability Scan:    Pt presents for viability scan due to hx of SAB, ectopic, and extremely anxious due to medications she was taking when she conceived  Unplanned pregnancy but happy  She works as a nurse at [employer]  She has since seen her PCP and has switched medications to those safe in pregnancy  Previously on Lisinopril, Naproxen, Ibuprofen, Excedrin  Has since discontinued all  Current Medications: Prenatal Vitamin with DHA, Endocet, Tramadol (currently weaning), Carvedilol, Zolpidem PRN, Atarax PRN    OB HISTORY:  Pregnancy was unplanned  LMP: 19 pt took Plan B 19 had positive home UPT 19  Pt very anxious about pregnancy  She is a G 6 P 1 0 4 1  Previous hx of ectopic pregnancy 2010  SAB 2013  2-VIP's  9/3/11  37wks, Female, Abruption    TVUS: [facility] IUP at 6w4d based on CRL of 0 72cm  S=D (+)  BPM    Rx for initial prenatal labs as well as referral for MFM  Pt to follow up for New OB appt which she has scheduled in two weeks per patient

## 2019-08-30 ENCOUNTER — CLINICAL SUPPORT (OUTPATIENT)
Dept: FAMILY MEDICINE CLINIC | Facility: CLINIC | Age: 39
End: 2019-08-30

## 2019-08-30 ENCOUNTER — TELEPHONE (OUTPATIENT)
Dept: OBGYN CLINIC | Facility: CLINIC | Age: 39
End: 2019-08-30

## 2019-08-30 ENCOUNTER — TELEPHONE (OUTPATIENT)
Dept: FAMILY MEDICINE CLINIC | Facility: CLINIC | Age: 39
End: 2019-08-30

## 2019-08-30 DIAGNOSIS — M54.50 CHRONIC BILATERAL LOW BACK PAIN WITHOUT SCIATICA: ICD-10-CM

## 2019-08-30 DIAGNOSIS — F11.20 OPIOID TYPE DEPENDENCE, CONTINUOUS (HCC): Primary | ICD-10-CM

## 2019-08-30 DIAGNOSIS — G89.29 CHRONIC BILATERAL LOW BACK PAIN WITHOUT SCIATICA: ICD-10-CM

## 2019-08-30 NOTE — TELEPHONE ENCOUNTER
Spoke with Dr Jessie Mendoza she said that she should call ob/gyn   Patient verbalized understanding

## 2019-08-30 NOTE — TELEPHONE ENCOUNTER
CANDELARIO - PT WOULD LIKE TO SPEAK TO YOU   SHE HAS A QUESTION REGARDING MEDICATION  PT IS THROWING UP  AND SHE CAN'T KEEP ANYTHING DOWN   SHE REQUESTED TO SPEAK TO YOU  SHE WANTS ZOFRAN OR SOMETHING

## 2019-08-30 NOTE — TELEPHONE ENCOUNTER
T/c from patient c/o vomiting an nausea and looking for recommendations , advised patient to start a VIT B supplement and also to try Unisom, advised patient to only take half of one tablet to see how she feels as this medication can make her drowsy and we advise to take in the evening when not operating any heavy machinery or driving , she asked if she could take during the day I informed her yes , but please try it for the first time when she will be at home and not out and about   Patient express understanding    Also advised patient if SX worse and she is not able to eat and keep food or liquids down for more than 24 Hrs she should call us should it be after hours she can contact the provider on call or seek ED for eval

## 2019-08-31 LAB
6MAM UR QL: NEGATIVE NG/ML
AMPHETAMINES UR QL: NEGATIVE NG/ML
BARBITURATES UR QL: NEGATIVE NG/ML
BENZODIAZ UR QL: NEGATIVE NG/ML
BENZODIAZ UR SCN-MCNC: NORMAL NG/ML
BZE UR QL: NEGATIVE NG/ML
CREAT UR-MCNC: 89.1 MG/DL
ETHANOL UR QL: NEGATIVE NG/ML
METHADONE UR QL: NEGATIVE NG/ML
OPIATES UR QL: NEGATIVE NG/ML
OXIDANTS UR QL: NEGATIVE MCG/ML
OXYCODONE UR QL: NEGATIVE NG/ML
PCP UR QL: NEGATIVE NG/ML
PH UR: 6.89 [PH] (ref 4.5–9)
SL AMB MEDMATCH 6 ACETYLMORPHINE: NORMAL
SL AMB MEDMATCH ALCOHOL METAB: NORMAL
SL AMB MEDMATCH AMPTHETAMINES: NORMAL
SL AMB MEDMATCH BARBITUATES: NORMAL
SL AMB MEDMATCH COCAINE METABOLITE: NORMAL
SL AMB MEDMATCH MARIJUANA METABOLITE: NORMAL
SL AMB MEDMATCH METHADONE METABOLITE: NORMAL
SL AMB MEDMATCH OPIATES: NORMAL
SL AMB MEDMATCH OXYCODONE: NORMAL
SL AMB MEDMATCH PHENCYCLIDINE: NORMAL
THC UR QL: NEGATIVE NG/ML

## 2019-09-03 ENCOUNTER — TELEPHONE (OUTPATIENT)
Dept: CARDIOLOGY CLINIC | Facility: CLINIC | Age: 39
End: 2019-09-03

## 2019-09-03 NOTE — TELEPHONE ENCOUNTER
I hope she should have stop taking lisinopril  She need to see high risk pregnancy assured history of hypertension  She should see Dr Fareed Montanez  He has a high risk pregnancy specialist he had office in [facility]

## 2019-09-03 NOTE — TELEPHONE ENCOUNTER
She should also talk to her primary care doctor    Her gyn doctor we should see her  Certainly these medications can affect none of them are safe in pregnancy but she need some of those medication high risk pregnancy doctors can manage that

## 2019-09-03 NOTE — TELEPHONE ENCOUNTER
Patient called to notify her cardiologist that  is now pregnant and needs to know if that changes her situation with her medications  Please call her to advise her at [phone]

## 2019-09-04 DIAGNOSIS — I10 ESSENTIAL HYPERTENSION: Primary | ICD-10-CM

## 2019-09-04 RX ORDER — LABETALOL 100 MG/1
100 TABLET, FILM COATED ORAL 2 TIMES DAILY
Qty: 60 TABLET | Refills: 2 | Status: SHIPPED | OUTPATIENT
Start: 2019-09-04 | End: 2019-12-02 | Stop reason: SDUPTHER

## 2019-09-05 NOTE — MISCELLANEOUS
Message  I called patient re pos ecoli shiga toxin identified in stool culture she was doing better temps only to 100 diarrhea had decreased markedly she was taking cipro which i stopped will fe labs and urine due to pos of hemolytic uremic sequalae instructions      Signatures   Electronically signed by : SERVANDO Rudolph ; Aug 10 2017 10:47AM EST                       (Author) Abdomen soft, nontender, nondistended, bowel sounds present in all 4 quadrants.

## 2019-09-10 PROBLEM — Z34.81 MULTIGRAVIDA IN FIRST TRIMESTER: Status: ACTIVE | Noted: 2019-09-10

## 2019-09-11 ENCOUNTER — INITIAL PRENATAL (OUTPATIENT)
Dept: OBGYN CLINIC | Facility: CLINIC | Age: 39
End: 2019-09-11

## 2019-09-11 ENCOUNTER — PROCEDURE VISIT (OUTPATIENT)
Dept: FAMILY MEDICINE CLINIC | Facility: CLINIC | Age: 39
End: 2019-09-11
Payer: COMMERCIAL

## 2019-09-11 VITALS
HEIGHT: 63 IN | SYSTOLIC BLOOD PRESSURE: 128 MMHG | DIASTOLIC BLOOD PRESSURE: 84 MMHG | WEIGHT: 133.2 LBS | BODY MASS INDEX: 23.6 KG/M2

## 2019-09-11 VITALS
TEMPERATURE: 98.9 F | RESPIRATION RATE: 18 BRPM | HEART RATE: 102 BPM | OXYGEN SATURATION: 100 % | SYSTOLIC BLOOD PRESSURE: 112 MMHG | DIASTOLIC BLOOD PRESSURE: 68 MMHG | BODY MASS INDEX: 23.56 KG/M2 | WEIGHT: 133 LBS

## 2019-09-11 DIAGNOSIS — Z34.81 MULTIGRAVIDA IN FIRST TRIMESTER: Primary | ICD-10-CM

## 2019-09-11 DIAGNOSIS — R30.0 DYSURIA: ICD-10-CM

## 2019-09-11 DIAGNOSIS — N30.01 ACUTE CYSTITIS WITH HEMATURIA: Primary | ICD-10-CM

## 2019-09-11 LAB
SL AMB  POCT GLUCOSE, UA: NORMAL
SL AMB LEUKOCYTE ESTERASE,UA: NORMAL
SL AMB POCT BILIRUBIN,UA: NORMAL
SL AMB POCT BLOOD,UA: 50
SL AMB POCT CLARITY,UA: CLEAR
SL AMB POCT COLOR,UA: YELLOW
SL AMB POCT KETONES,UA: NORMAL
SL AMB POCT NITRITE,UA: NORMAL
SL AMB POCT PH,UA: 5
SL AMB POCT SPECIFIC GRAVITY,UA: 1.02
SL AMB POCT URINE PROTEIN: NORMAL
SL AMB POCT UROBILINOGEN: NORMAL

## 2019-09-11 PROCEDURE — 81003 URINALYSIS AUTO W/O SCOPE: CPT | Performed by: FAMILY MEDICINE

## 2019-09-11 PROCEDURE — PNV: Performed by: PHYSICIAN ASSISTANT

## 2019-09-11 RX ORDER — OXYCODONE HYDROCHLORIDE AND ACETAMINOPHEN 5; 325 MG/1; MG/1
1 TABLET ORAL EVERY 4 HOURS PRN
COMMUNITY
End: 2019-09-12 | Stop reason: SDUPTHER

## 2019-09-11 NOTE — PROGRESS NOTES
Pt for NOB pap and cx's done  TVUS + FHR CRL c/w dates  Pt has slip for BW from [facility]  Pt had episode a few mths ago w CP and SOB where she then had neg CT + d-dimer  Saw cards but no hypercoag worked up  Pt is anxious that she may have a clotting d/o  Pt anxious about the pregnancy in general due to med use in early pregnancy, AMA risk and previous delivery  Pt does plan to see [facility] and desires NIPS as well  Pt is still taking daily Tramadol but has decreased it from q 6 hours to now q 8-12 as needed  Plans to continue to wemiky as able to  BP today looks good

## 2019-09-12 DIAGNOSIS — M54.2 CERVICALGIA: Primary | ICD-10-CM

## 2019-09-12 RX ORDER — OXYCODONE HYDROCHLORIDE AND ACETAMINOPHEN 5; 325 MG/1; MG/1
1 TABLET ORAL 2 TIMES DAILY
Qty: 60 TABLET | Refills: 0 | Status: SHIPPED | OUTPATIENT
Start: 2019-09-12 | End: 2019-10-09 | Stop reason: SDUPTHER

## 2019-09-14 LAB — SL AMB GENITAL CULTURE: NORMAL

## 2019-09-16 LAB
DEPRECATED C TRACH RRNA XXX QL PRB: NOT DETECTED
HPV HR 12 DNA CVX QL NAA+PROBE: NOT DETECTED
HPV16 DNA SPEC QL NAA+PROBE: NOT DETECTED
HPV18 DNA SPEC QL NAA+PROBE: NOT DETECTED
N GONORRHOEA DNA UR QL NAA+PROBE: NOT DETECTED
T VAGINALIS RRNA SPEC QL NAA+PROBE: NOT DETECTED
THIN PREP CVX: NORMAL

## 2019-09-18 DIAGNOSIS — M54.2 CERVICALGIA: ICD-10-CM

## 2019-09-18 RX ORDER — TRAMADOL HYDROCHLORIDE 50 MG/1
50 TABLET ORAL EVERY 8 HOURS PRN
Qty: 90 TABLET | Refills: 0 | Status: SHIPPED | OUTPATIENT
Start: 2019-09-18 | End: 2019-10-15 | Stop reason: SDUPTHER

## 2019-09-23 NOTE — PROGRESS NOTES
Genetic Counseling Note        [place]     Appointment Date:  9/23/2019  Referred By: NIKI Fernandez  YOB: 1980  Partner:  Joe lElis    Pregnancy History: [id number]  Estimated Date of Delivery: 4/14/20  Estimated Gestational Age: 5 weeks 2 days     Genetic Counseling:advanced maternal age, history of prenatal exposure and personal and/or family history of genetic disorder:  FOB sickle cell carrier    Re Pina is a(n) 42 y o  female who is here to discuss maternal age related risk for aneuploidy    Issues Discussed:  Average population risk: 3-4% in the average pregnancy of serious condition or birth defect  2-3% risk of mental retardation  Not all detected by prenatal testing  Chromosomal: duplication and/or deletion  Mode of inheritance/mechanism: autosomal recessive inheritance of sickle cell anemia and other hemoglobinopathies  Teratogens: tramadol  Oxycodone, lisinopril, carvedilol and Ambien  Maternal age  Risk of aneuploidy    Options Discussed:  Amniocentesis: risks and limitations discussed  CVS: risks and limitations discussed  Ethnic screening discussed: clinical and genetic basis of CF, SMA, Fragile, hemoglobinopathies and expanded carrier screening  Level II ultrasound to screen for structural anomalies  Nuchal translucency/1st trimester serum screen: goals and limitations discussed  Serum AFP screen recommended at 15-17 weeks to check for open neural tube defects  Cell free fetal DNA testing  Fetal echocardiogram    Additional Information / Impression / Plan / Tests Ordered:  Re Pina presents for genetic counseling to discuss maternal age related risk for aneuploidy as indicated above  She is accompanied by her   I reviewed with the patient the options regarding prenatal diagnosis for chromosome abnormalities including CVS and amniocentesis   Chorionic villus sampling(CVS) is generally performed between 10 and 12 weeks of pregnancy and amniocentesis is generally performed at 16 weeks or later  Recent literature supports that CVS and amniocentsis both have an associated  procedure related risk of miscarriage of less than 1/300  Chromosome results from CVS or amniocentesis are greater than 99 9% accurate  Occasionally a repeat procedure may be necessary due to cell culture failure  Approximately 1% of the time, a mosaic chromosome result from CVS will necessitate a followup amniocentesis  Measurement of AFP from amniotic fluid is able to detect approximately 95% of open neural tube defects  Maternal serum AFP is recommended for patients who elect CVS     We also reviewed the availability and limitations of sequential screening, NIPS, and level II ultrasound evaluation  Sequential screening consisting of first trimester measurement of nuchal translucency combined with first trimester biochemical analysis as well as second trimester biochemical analysis is able to detect approximately 90% of pregnancies in which the fetus has Down syndrome, 90% of pregnancies in which the fetus has trisomy 25 and 80% of pregnancies which the fetus has an open neural tube defect  NIPS involves assessment of fragments of fetal DNA in maternal blood  This test has varying detection rates depending on the laboratory used though the quoted detection rate for Down syndrome is generally greater than 99% and detection rate for trisomy 18 is 98% or better  NIPS has a low false positive rate however consideration of prenatal diagnostic testing is always recommended following a positive NIPS  Level II ultrasound evaluation is able to detect  many major physical birth defects and variations in fetal development that may be associated with chromosome abnormalities  Level II ultrasound evaluation is not able to detect all birth defects or health problems          After discussing the available prenatal diagnostic and screening procedures this couple elected to decline prenatal diagnostic testing  They did opt to pursue cell free fetal DNA testing  Blood will be drawn following the genetic counseling session and sent to [facility]  They were made aware of expected out-of-pocket cost as calculated by the online   In addition, Leah Garland is planning to pursue nuchal translucency ultrasound, level 2 ultrasound and MSAFP screening at the appropriate times  An appointment for nuchal translucency ultrasound was scheduled  During our counseling session histories were taken on the patient's family and her 's family  Quincy Sharp reports having sickle cell trait  This was identified at the time of  screening performed under 8year-old daughter who also has sickle cell trait  We reviewed the autosomal recessive inheritance of sickle cell and other hemoglobinopathies and discussed the importance of the patient being screened  Historically, she has normal MCV, MCH and hemoglobin levels however hemoglobin electrophoresis has not been performed  This testing was ordered today and will be drawn following counseling session  In reviewing the patient's family history, she reports having a history of scoliosis, hypertension and tachycardia  Her mother also has a history of hypertension and heart issues  Her sister also has hypertension  The patient denies any known genetic diagnosis  In the absence of a known genetic diagnosis we reviewed the multifactorial inheritance of all of these conditions and discussed the increased risk to first degree relatives including this pregnancy  Specific recurrence for scoliosis is estimated to be approximately 5-7% though it may be higher in first-degree relatives of individuals diagnosed in adolescents as was the case with this patient  She was encouraged to make her pediatrician aware of this history   Management of the history of hypertension and tachycardia in the patient as it applies to the pregnancy will be discussed in more detail with the perinatologist at the time of the consult following nuchal translucency ultrasound  The patient reports being of [place] descent while her  reports being of  descent  They both deny having any known Rastafari ancestry  Additional carrier screening including CF, SMA, fragile X expanded carrier screening was discussed  Bj Bee elected to perform the expanded carrier screening panel offered by [employer] which includes screening for approximately 144 conditions  They were made aware of out-of-pocket expense for this testing as well  The patient reports taking multiple medications for management of hypertension and chronic neck pain  Some of these medications pose potential risks to the pregnancy  Those medications include lisinopril, carvedilol, tramadol, oxycodone and Ambien  We discussed some general rule information regarding potential tried a genetic exposures during pregnancy  First, there is a population risk for all birth defects  In order to be considered teratogenic an substance/medication has to be associated with an increased risk for a particular birth defect or pattern of birth defects over what you would expect to see in the general population  Second, data regarding exposures related to pregnancy come from 2 sources  Animal studies which may or may not be reliably applied deemed experience an anecdotal reports of woman who happened to be exposed to particular medications during pregnancy  Third, it is not possible to determine potential interactive affects from taking multiple medications during pregnancy nor is it possible to clarify risks given that it is unlikely there is another patient exposed to the same dosage and combination of medications to a given individual     According to the reproductive toxicity review, lisinopril and other Ace inhibitors can cause fetal damage in death during the 2nd and 3rd trimesters of pregnancy  First-trimester affects have not been confirmed  Given these risks, Angela Edgar reports that her physicians switched her to labetalol which is a preferred hypertensive agent during pregnancy  The information on carvedilol which is an alpha and beta sympathetic blocker is more limited  Experimental animal studies do not suggest an increased risk for birth defects and a pharmacy database did not find an increased risk for malformations  The patient also takes tramadol and oxycodone for pain management  Both of these are opioid analgesics  The biggest concern is  withdrawal   Once study found an increased risk for pulmonary valve stenosis associated with oxycodone  Experimental animal studies did not find an increased risk for birth defects with use of tramadol during pregnancy  The patient reports that her physician is planning on weaning her off of these medications hopefully by the early 3rd trimester of her pregnancy  We discussed the availability limitations of ultrasound evaluation including nuchal translucency ultrasound, level 2 ultrasound of fetal echocardiography for detecting any potential associated birth defects prenatally  The patient is interested in having all these ultrasound evaluations performed  Lastly, we discussed the fact that it is important to keep in mind that everyone in the general population regardless of age, family history, or medical background has approximately a 3% risk of having a child with some type of her defect, genetic disease or intellectual disability  Currently there are no tests available to rule out all birth defects or health problems              Time spent with Genetic Counselor: 60 minutes

## 2019-09-25 ENCOUNTER — APPOINTMENT (OUTPATIENT)
Dept: LAB | Facility: HOSPITAL | Age: 39
End: 2019-09-25
Payer: COMMERCIAL

## 2019-09-25 ENCOUNTER — TRANSCRIBE ORDERS (OUTPATIENT)
Dept: LAB | Facility: HOSPITAL | Age: 39
End: 2019-09-25

## 2019-09-25 ENCOUNTER — CONSULT (OUTPATIENT)
Dept: PERINATAL CARE | Facility: CLINIC | Age: 39
End: 2019-09-25

## 2019-09-25 VITALS
BODY MASS INDEX: 23.71 KG/M2 | SYSTOLIC BLOOD PRESSURE: 117 MMHG | WEIGHT: 133.8 LBS | HEIGHT: 63 IN | DIASTOLIC BLOOD PRESSURE: 84 MMHG

## 2019-09-25 DIAGNOSIS — O09.521 MATERNAL AGE 35+, MULTIGRAVIDA, ANTEPARTUM, FIRST TRIMESTER: Primary | ICD-10-CM

## 2019-09-25 DIAGNOSIS — O35.2XX0 HEREDITARY DISEASE IN FAMILY POSSIBLY AFFECTING FETUS, AFFECTING MANAGEMENT OF MOTHER IN PREGNANCY, SINGLE OR UNSPECIFIED FETUS: ICD-10-CM

## 2019-09-25 DIAGNOSIS — Z31.430 ENCOUNTER OF FEMALE FOR TESTING FOR GENETIC DISEASE CARRIER STATUS FOR PROCREATIVE MANAGEMENT: ICD-10-CM

## 2019-09-25 DIAGNOSIS — Z34.81 PRENATAL CARE, SUBSEQUENT PREGNANCY, FIRST TRIMESTER: ICD-10-CM

## 2019-09-25 LAB
ABO GROUP BLD: NORMAL
ALBUMIN SERPL BCP-MCNC: 3.7 G/DL (ref 3.5–5)
ALP SERPL-CCNC: 40 U/L (ref 46–116)
ALT SERPL W P-5'-P-CCNC: 15 U/L (ref 12–78)
ANION GAP SERPL CALCULATED.3IONS-SCNC: 8 MMOL/L (ref 4–13)
AST SERPL W P-5'-P-CCNC: 13 U/L (ref 5–45)
BASOPHILS # BLD AUTO: 0.03 THOUSANDS/ΜL (ref 0–0.1)
BASOPHILS NFR BLD AUTO: 1 % (ref 0–1)
BILIRUB SERPL-MCNC: 0.43 MG/DL (ref 0.2–1)
BLD GP AB SCN SERPL QL: NEGATIVE
BUN SERPL-MCNC: 13 MG/DL (ref 5–25)
CALCIUM SERPL-MCNC: 9.1 MG/DL (ref 8.3–10.1)
CHLORIDE SERPL-SCNC: 106 MMOL/L (ref 100–108)
CO2 SERPL-SCNC: 21 MMOL/L (ref 21–32)
CREAT SERPL-MCNC: 0.45 MG/DL (ref 0.6–1.3)
EOSINOPHIL # BLD AUTO: 0.03 THOUSAND/ΜL (ref 0–0.61)
EOSINOPHIL NFR BLD AUTO: 1 % (ref 0–6)
ERYTHROCYTE [DISTWIDTH] IN BLOOD BY AUTOMATED COUNT: 15.7 % (ref 11.6–15.1)
GFR SERPL CREATININE-BSD FRML MDRD: 128 ML/MIN/1.73SQ M
GLUCOSE SERPL-MCNC: 79 MG/DL (ref 65–140)
HBV SURFACE AG SER QL: NORMAL
HCT VFR BLD AUTO: 32.9 % (ref 34.8–46.1)
HCV AB SER QL: NORMAL
HGB BLD-MCNC: 10.1 G/DL (ref 11.5–15.4)
IMM GRANULOCYTES # BLD AUTO: 0.01 THOUSAND/UL (ref 0–0.2)
IMM GRANULOCYTES NFR BLD AUTO: 0 % (ref 0–2)
LYMPHOCYTES # BLD AUTO: 1.39 THOUSANDS/ΜL (ref 0.6–4.47)
LYMPHOCYTES NFR BLD AUTO: 21 % (ref 14–44)
MCH RBC QN AUTO: 26.2 PG (ref 26.8–34.3)
MCHC RBC AUTO-ENTMCNC: 30.7 G/DL (ref 31.4–37.4)
MCV RBC AUTO: 85 FL (ref 82–98)
MONOCYTES # BLD AUTO: 0.47 THOUSAND/ΜL (ref 0.17–1.22)
MONOCYTES NFR BLD AUTO: 7 % (ref 4–12)
NEUTROPHILS # BLD AUTO: 4.67 THOUSANDS/ΜL (ref 1.85–7.62)
NEUTS SEG NFR BLD AUTO: 70 % (ref 43–75)
NRBC BLD AUTO-RTO: 0 /100 WBCS
PLATELET # BLD AUTO: 384 THOUSANDS/UL (ref 149–390)
PMV BLD AUTO: 9.3 FL (ref 8.9–12.7)
POTASSIUM SERPL-SCNC: 3.6 MMOL/L (ref 3.5–5.3)
PROT SERPL-MCNC: 7 G/DL (ref 6.4–8.2)
RBC # BLD AUTO: 3.86 MILLION/UL (ref 3.81–5.12)
RH BLD: POSITIVE
RPR SER QL: NORMAL
RUBV IGG SERPL IA-ACNC: 11.8 IU/ML
SODIUM SERPL-SCNC: 135 MMOL/L (ref 136–145)
URATE SERPL-MCNC: 2.2 MG/DL (ref 2–6.8)
WBC # BLD AUTO: 6.6 THOUSAND/UL (ref 4.31–10.16)

## 2019-09-25 PROCEDURE — 86787 VARICELLA-ZOSTER ANTIBODY: CPT

## 2019-09-25 PROCEDURE — 87086 URINE CULTURE/COLONY COUNT: CPT

## 2019-09-25 PROCEDURE — 83020 HEMOGLOBIN ELECTROPHORESIS: CPT

## 2019-09-25 PROCEDURE — 84550 ASSAY OF BLOOD/URIC ACID: CPT

## 2019-09-25 PROCEDURE — 80053 COMPREHEN METABOLIC PANEL: CPT

## 2019-09-25 PROCEDURE — 36415 COLL VENOUS BLD VENIPUNCTURE: CPT | Performed by: NURSE PRACTITIONER

## 2019-09-25 PROCEDURE — 86803 HEPATITIS C AB TEST: CPT | Performed by: NURSE PRACTITIONER

## 2019-09-25 PROCEDURE — 80081 OBSTETRIC PANEL INC HIV TSTG: CPT | Performed by: NURSE PRACTITIONER

## 2019-09-26 LAB
BACTERIA UR CULT: NORMAL
VZV IGG SER IA-ACNC: NORMAL

## 2019-09-27 LAB
DEPRECATED HGB OTHER BLD-IMP: 0 %
HGB A MFR BLD: 1.9 % (ref 1.8–3.2)
HGB A MFR BLD: 98.1 % (ref 96.4–98.8)
HGB C MFR BLD: 0 %
HGB F MFR BLD: 0 % (ref 0–2)
HGB FRACT BLD-IMP: NORMAL
HGB S BLD QL SOLY: NEGATIVE
HGB S MFR BLD: 0 %
HIV 1+2 AB+HIV1 P24 AG SERPL QL IA: NORMAL

## 2019-09-30 ENCOUNTER — TELEPHONE (OUTPATIENT)
Dept: PERINATAL CARE | Facility: CLINIC | Age: 39
End: 2019-09-30

## 2019-09-30 NOTE — TELEPHONE ENCOUNTER
Telephone call to patient  Confirmed date of birth  Patient informed that her cell free fetal DNA test results were screen negative for trisomy 21, 18 and 13  Patient informed of fetal gender (female) at her request   Also informed patient of her normal hemoglobin electrophoresis result  Patient advised to expect paperwork for MSAFP screening  Patient expressed understanding is no additional questions at this time

## 2019-10-02 ENCOUNTER — DOCUMENTATION (OUTPATIENT)
Dept: PERINATAL CARE | Facility: CLINIC | Age: 39
End: 2019-10-02

## 2019-10-04 ENCOUNTER — ROUTINE PRENATAL (OUTPATIENT)
Dept: OBGYN CLINIC | Facility: CLINIC | Age: 39
End: 2019-10-04

## 2019-10-04 VITALS
SYSTOLIC BLOOD PRESSURE: 124 MMHG | WEIGHT: 137.8 LBS | HEIGHT: 63 IN | BODY MASS INDEX: 24.41 KG/M2 | DIASTOLIC BLOOD PRESSURE: 80 MMHG

## 2019-10-04 DIAGNOSIS — Z3A.12 PREGNANCY WITH 12 COMPLETED WEEKS GESTATION: Primary | ICD-10-CM

## 2019-10-04 PROCEDURE — PNV: Performed by: OBSTETRICS & GYNECOLOGY
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
## 2019-10-07 DIAGNOSIS — M54.2 CERVICALGIA: ICD-10-CM

## 2019-10-07 DIAGNOSIS — O21.9 PREGNANCY RELATED NAUSEA AND VOMITING, ANTEPARTUM: Primary | ICD-10-CM

## 2019-10-07 RX ORDER — ONDANSETRON HYDROCHLORIDE 8 MG/1
8 TABLET, FILM COATED ORAL EVERY 12 HOURS PRN
Qty: 60 TABLET | Refills: 1 | Status: SHIPPED | OUTPATIENT
Start: 2019-10-07 | End: 2019-12-02 | Stop reason: SDUPTHER

## 2019-10-07 NOTE — TELEPHONE ENCOUNTER
Pt following up from her apt on 10/4/19 would like to have a medication ordered for N/V  Dr Saranya Ha advises Zofran 8mg q 12 prn

## 2019-10-08 DIAGNOSIS — L70.0 ACNE VULGARIS: ICD-10-CM

## 2019-10-08 RX ORDER — DAPSONE 50 MG/G
GEL TOPICAL
Qty: 90 G | Refills: 0 | Status: SHIPPED | OUTPATIENT
Start: 2019-10-08 | End: 2019-10-21 | Stop reason: SDUPTHER

## 2019-10-09 ENCOUNTER — ROUTINE PRENATAL (OUTPATIENT)
Dept: PERINATAL CARE | Facility: CLINIC | Age: 39
End: 2019-10-09
Payer: COMMERCIAL

## 2019-10-09 VITALS
HEIGHT: 63 IN | SYSTOLIC BLOOD PRESSURE: 122 MMHG | BODY MASS INDEX: 24.49 KG/M2 | DIASTOLIC BLOOD PRESSURE: 84 MMHG | HEART RATE: 85 BPM | WEIGHT: 138.2 LBS

## 2019-10-09 DIAGNOSIS — Z98.891 HISTORY OF CESAREAN DELIVERY: ICD-10-CM

## 2019-10-09 DIAGNOSIS — E55.9 VITAMIN D DEFICIENCY: ICD-10-CM

## 2019-10-09 DIAGNOSIS — I10 ESSENTIAL HYPERTENSION: ICD-10-CM

## 2019-10-09 DIAGNOSIS — Z36.82 NUCHAL TRANSLUCENCY OF FETUS ON PRENATAL ULTRASOUND: ICD-10-CM

## 2019-10-09 DIAGNOSIS — Z20.828 PARVOVIRUS EXPOSURE: Primary | ICD-10-CM

## 2019-10-09 DIAGNOSIS — M54.2 CERVICALGIA: ICD-10-CM

## 2019-10-09 PROBLEM — Z83.2 FAMILY HISTORY OF HEMOGLOBINOPATHY: Status: ACTIVE | Noted: 2019-10-09

## 2019-10-09 PROBLEM — O09.529 ADVANCED MATERNAL AGE IN MULTIGRAVIDA: Status: ACTIVE | Noted: 2019-09-10

## 2019-10-09 PROBLEM — R07.9 CHEST PAIN: Status: RESOLVED | Noted: 2019-03-26 | Resolved: 2019-10-09

## 2019-10-09 PROBLEM — R00.2 PALPITATION: Status: RESOLVED | Noted: 2019-06-17 | Resolved: 2019-10-09

## 2019-10-09 PROCEDURE — 76801 OB US < 14 WKS SINGLE FETUS: CPT | Performed by: OBSTETRICS & GYNECOLOGY

## 2019-10-09 PROCEDURE — 76813 OB US NUCHAL MEAS 1 GEST: CPT | Performed by: OBSTETRICS & GYNECOLOGY

## 2019-10-09 PROCEDURE — 99214 OFFICE O/P EST MOD 30 MIN: CPT | Performed by: OBSTETRICS & GYNECOLOGY

## 2019-10-09 RX ORDER — ASPIRIN 81 MG/1
162 TABLET ORAL DAILY
Qty: 60 TABLET | Refills: 1 | Status: SHIPPED | OUTPATIENT
Start: 2019-10-09 | End: 2019-11-30 | Stop reason: SDUPTHER

## 2019-10-09 RX ORDER — OXYCODONE HYDROCHLORIDE AND ACETAMINOPHEN 5; 325 MG/1; MG/1
1 TABLET ORAL 2 TIMES DAILY
Qty: 60 TABLET | Refills: 0 | Status: SHIPPED | OUTPATIENT
Start: 2019-10-09 | End: 2019-11-04 | Stop reason: SDUPTHER

## 2019-10-09 NOTE — LETTER
October 9, 2019     Lisa Coughlin MD  [facility] [place] [address]    Patient: Nunu Eddy   YOB: 1980   Date of Visit: 10/9/2019       Dear Dr Chilo Woods: Thank you for referring Wilber Perez to me for evaluation  Below are my notes for this consultation  If you have questions, please do not hesitate to call me  I look forward to following your patient along with you  Sincerely,        Abby Gutierrez MD        CC: DO Abby Emerson MD  10/9/2019  8:18 PM  Sign at close encounter  [address]: Ms Stacey Richardson was seen today at 13w1d for nuchal translucency ultrasound  See ultrasound report under "OB Procedures" tab  My recommendations are as follows:    1  We reviewed the availability of genetic screening, as well as diagnostic testing, which are available to all pregnant women  We reviewed limitations, risks, and benefits of screening and testing  She had low risk NIPT performed this pregnancy  She does not wish to pursue diagnostic testing at this time  MSAFP screening is recommended as well  Zenia Best completed genetic counseling previously in our office, and was informed of the results of her normal hemoglobin electrophoresis  She inquired about the results of her additional carrier testing and expanded carrier screening, which I do not have today  I have asked our genetic counselor, Pratik Vora to follow-up on the results and reach out to Mrs [place] with the results when available  2   A detailed anatomic survey as well as transvaginal cervical length screening are recommended between 18-22 weeks gestation  3    Advanced Maternal Age (AMA) is defined as maternal age 28 or greater at [facility]  Advanced maternal age is associated with an increased risk of several pregnancy outcomes, including aneuploidy/genetic syndromes, poor fetal growth, stillbirth, maternal hypertensive disorders, and gestational diabetes  Despite these increased risks, many women of advanced maternal age have normal, healthy pregnancy outcomes, particularly if they have no co-existing medical conditions  Genetic counseling is available to further discuss genetic screening and testing options available in pregnancy  Risk of adverse outcomes is proportional to patient age  For women age 36 and older, we recommend serial ultrasounds for fetal growth (at 29 and 34 weeks), as well as initiating antepartum fetal surveillance weekly at 36 weeks gestation  4   Ms Samson Caballero has a history of one prior emergent  delivery in the setting of placental abruption  I do not have her operative note available for review today  Most women with one or two prior low-transverse uterine incisions are candidates for trial of labor after  (TOLAC)  The majority of women attempting a trial of labor after  will succeed, but individual chance of success varies based on individual patient factors, including the circumstances of prior  delivery/deliveries  The most significant, but fortunately rare, risk of TOLAC is uterine rupture, which occurs in 0 5-0 9% of women attempting a TOLAC, and is associated with risks of maternal and fetal morbidity and mortality  Decision regarding mode of delivery (planned repeat  versus TOLAC) should be made after counseling by the patient and her obstetric care provider  Individual chance of success can be estimated using a predictive tool ( calculator), available at [email or web address] [facility]/MU/[email or web address]/vagbirth html  In light of her history of placental abruption, there is an increased risk of placental abruption this pregnancy  She is advised to seek obstetric evaluation for any signs/symptoms of placental abruption such as severe pain or significant vaginal bleeding   There are no specific preventive measures to reduce recurrence risk, aside from optimizing her own health in pregnancy and continuing to keep blood pressure under control  5  Scoliosis/herniated disc: Yakelin has significant scoliosis and cervical disc disease  Her epidural in her previous pregnancy was ineffective, hence the need for general anesthesia for her   She also has significant pain as a result  We reviewed that there current medications (acetaminophen, tramadol, percocet) do not have risk of malformations in pregnancy  Opioid pain medications do have a risk of  withdrawal syndrome for neonates exposed in utero  The decision to continue versus taper these medications in pregnancy should be individualized  Ciara Allison is motivated to taper her medications prior to delivery to minimize risk of  abstinence syndrome  She was advised that abrupt discontinuation may precipitate withdrawal in herself, and was advised that abruptly stopping would not be advisable for her or the fetus  Given her scoliosis, I recommend consultation with obstetric anesthesiology in the third trimester prior to delivery  6  Chronic hypertension: We reviewed her history of chronic hypertension, which is associated with pregnancy risks including preeclampsia, poor fetal growth, and a slight increase in risk of stillbirth  Control of blood pressure in pregnancy helps to minimize these risks  Unless contraindicated, baby aspirin (81-162mg/day) is recommended to reduce risk of preeclampsia  She is already taking baby aspirin daily  Baseline evaluation of hepatic and renal function, as well as quantification of proteinuria is reasonable to consider  Serial evaluation of fetal growth every 4-6 weeks is recomended beginning at 28 weeks gestation  As well, twice weekly antepartum fetal surveillance should be initiated at 32 weeks gestation  Delivery timing suggested is between 37 0/7 and 39 6/7 weeks gestation  As well, a home blood pressure cuff is suggested for monitoring     7  Parvovirus exposure: Ciara Allison was notified by her child's  of a Parvovirus outbreak 2 weeks ago  Her son did have some viral symptoms, but did not have a confirmed diagnosis of parvovirus  Given the obstetric implications of parvovirus infection, I did order parvovirus IgM and IgG  If an acute infection is suspected (positive IgM), surveillance for fetal anemia would be indicated       Please don't hesitate to contact our office with any concerns or questions     -Susana Aviles MD

## 2019-10-09 NOTE — PROGRESS NOTES
[address]: Ms Alo Meyer was seen today at 13w1d for nuchal translucency ultrasound  See ultrasound report under "OB Procedures" tab  My recommendations are as follows:    1  We reviewed the availability of genetic screening, as well as diagnostic testing, which are available to all pregnant women  We reviewed limitations, risks, and benefits of screening and testing  She had low risk NIPT performed this pregnancy  She does not wish to pursue diagnostic testing at this time  MSAFP screening is recommended as well  Tess Markusindio completed genetic counseling previously in our office, and was informed of the results of her normal hemoglobin electrophoresis  She inquired about the results of her additional carrier testing and expanded carrier screening, which I do not have today  I have asked our genetic counselor, Marcia Sin to follow-up on the results and reach out to [place] with the results when available  2   A detailed anatomic survey as well as transvaginal cervical length screening are recommended between 18-22 weeks gestation  3    Advanced Maternal Age (AMA) is defined as maternal age 28 or greater at [facility]  Advanced maternal age is associated with an increased risk of several pregnancy outcomes, including aneuploidy/genetic syndromes, poor fetal growth, stillbirth, maternal hypertensive disorders, and gestational diabetes  Despite these increased risks, many women of advanced maternal age have normal, healthy pregnancy outcomes, particularly if they have no co-existing medical conditions  Genetic counseling is available to further discuss genetic screening and testing options available in pregnancy  Risk of adverse outcomes is proportional to patient age  For women age 36 and older, we recommend serial ultrasounds for fetal growth (at 29 and 34 weeks), as well as initiating antepartum fetal surveillance weekly at 36 weeks gestation     4   Ms Alo Meyer has a history of one prior emergent  delivery in the setting of placental abruption  I do not have her operative note available for review today  Most women with one or two prior low-transverse uterine incisions are candidates for trial of labor after  (TOLAC)  The majority of women attempting a trial of labor after  will succeed, but individual chance of success varies based on individual patient factors, including the circumstances of prior  delivery/deliveries  The most significant, but fortunately rare, risk of TOLAC is uterine rupture, which occurs in 0 5-0 9% of women attempting a TOLAC, and is associated with risks of maternal and fetal morbidity and mortality  Decision regarding mode of delivery (planned repeat  versus TOLAC) should be made after counseling by the patient and her obstetric care provider  Individual chance of success can be estimated using a predictive tool ( calculator), available at [email or web address] [facility]/VGBirthCalc/vagbirth html  In light of her history of placental abruption, there is an increased risk of placental abruption this pregnancy  She is advised to seek obstetric evaluation for any signs/symptoms of placental abruption such as severe pain or significant vaginal bleeding  There are no specific preventive measures to reduce recurrence risk, aside from optimizing her own health in pregnancy and continuing to keep blood pressure under control  5  Scoliosis/herniated disc: Yakelin has significant scoliosis and cervical disc disease  Her epidural in her previous pregnancy was ineffective, hence the need for general anesthesia for her   She also has significant pain as a result  We reviewed that there current medications (acetaminophen, tramadol, percocet) do not have risk of malformations in pregnancy  Opioid pain medications do have a risk of  withdrawal syndrome for neonates exposed in utero   The decision to continue versus taper these medications in pregnancy should be individualized  Yordan Lockwood is motivated to taper her medications prior to delivery to minimize risk of  abstinence syndrome  She was advised that abrupt discontinuation may precipitate withdrawal in herself, and was advised that abruptly stopping would not be advisable for her or the fetus  Given her scoliosis, I recommend consultation with obstetric anesthesiology in the third trimester prior to delivery  6  Chronic hypertension: We reviewed her history of chronic hypertension, which is associated with pregnancy risks including preeclampsia, poor fetal growth, and a slight increase in risk of stillbirth  Control of blood pressure in pregnancy helps to minimize these risks  Unless contraindicated, baby aspirin (81-162mg/day) is recommended to reduce risk of preeclampsia  She is already taking baby aspirin daily  Baseline evaluation of hepatic and renal function, as well as quantification of proteinuria is reasonable to consider  Serial evaluation of fetal growth every 4-6 weeks is recomended beginning at 28 weeks gestation  As well, twice weekly antepartum fetal surveillance should be initiated at 32 weeks gestation  Delivery timing suggested is between 37 0/7 and 39 6/7 weeks gestation  As well, a home blood pressure cuff is suggested for monitoring  7  Parvovirus exposure: Yordan Lockwood was notified by her child's  of a Parvovirus outbreak 2 weeks ago  Her son did have some viral symptoms, but did not have a confirmed diagnosis of parvovirus  Given the obstetric implications of parvovirus infection, I did order parvovirus IgM and IgG  If an acute infection is suspected (positive IgM), surveillance for fetal anemia would be indicated       Please don't hesitate to contact our office with any concerns or questions     -Ion Garrido MD

## 2019-10-14 ENCOUNTER — TELEPHONE (OUTPATIENT)
Dept: LABOR AND DELIVERY | Facility: HOSPITAL | Age: 39
End: 2019-10-14

## 2019-10-14 DIAGNOSIS — M54.2 CERVICALGIA: ICD-10-CM

## 2019-10-14 DIAGNOSIS — F51.01 PRIMARY INSOMNIA: ICD-10-CM

## 2019-10-14 RX ORDER — OXYCODONE HYDROCHLORIDE AND ACETAMINOPHEN 5; 325 MG/1; MG/1
1 TABLET ORAL 2 TIMES DAILY
Qty: 60 TABLET | Refills: 0 | OUTPATIENT
Start: 2019-10-14

## 2019-10-14 RX ORDER — ERGOCALCIFEROL 1.25 MG/1
50000 CAPSULE ORAL WEEKLY
Qty: 8 CAPSULE | Refills: 0 | Status: SHIPPED | OUTPATIENT
Start: 2019-10-14 | End: 2019-12-02 | Stop reason: SDUPTHER

## 2019-10-14 NOTE — TELEPHONE ENCOUNTER
I called Mo Beth and left a voice mail to find if she is interested in enrolling in the [facility]  Several call back numbers were given to Mo Beth

## 2019-10-15 DIAGNOSIS — M54.2 CERVICALGIA: ICD-10-CM

## 2019-10-15 NOTE — TELEPHONE ENCOUNTER
Ralph;l be out thusay so would like to  tomorrow night, also would like to drop dose down to 2 times per dayhrd

## 2019-10-16 ENCOUNTER — TELEPHONE (OUTPATIENT)
Dept: PERINATAL CARE | Facility: CLINIC | Age: 39
End: 2019-10-16

## 2019-10-16 DIAGNOSIS — M54.2 CERVICALGIA: ICD-10-CM

## 2019-10-16 PROBLEM — Z14.8 GENETIC CARRIER OF OTHER DISEASE: Status: ACTIVE | Noted: 2019-10-16

## 2019-10-16 RX ORDER — TRAMADOL HYDROCHLORIDE 50 MG/1
TABLET ORAL
Qty: 90 TABLET | Refills: 0 | Status: SHIPPED | OUTPATIENT
Start: 2019-10-16 | End: 2019-11-01 | Stop reason: SDUPTHER

## 2019-10-16 RX ORDER — TRAMADOL HYDROCHLORIDE 50 MG/1
50 TABLET ORAL EVERY 8 HOURS PRN
Qty: 90 TABLET | Refills: 0 | Status: SHIPPED | OUTPATIENT
Start: 2019-10-16 | End: 2019-12-08 | Stop reason: SDUPTHER

## 2019-10-16 NOTE — TELEPHONE ENCOUNTER
Telephone call to patient regarding her carrier screening test results which were screen positive for elect to see me a  Reviewed the clinical characteristics of this condition and discussed autosomal recessive inheritance in the importance of her partner being tested  Patient gave verbal permission to discuss her results with her partner  Telephone call to partner to discuss testing  His insurance is the same as the patient's  Will submit for cost estimate    Instructed FOB to get back to me to confirm moving forward once he has conversation about cost      [id number]

## 2019-10-17 ENCOUNTER — TELEPHONE (OUTPATIENT)
Dept: FAMILY MEDICINE CLINIC | Facility: CLINIC | Age: 39
End: 2019-10-17

## 2019-10-21 ENCOUNTER — TELEPHONE (OUTPATIENT)
Dept: PERINATAL CARE | Facility: CLINIC | Age: 39
End: 2019-10-21

## 2019-10-21 DIAGNOSIS — F51.01 PRIMARY INSOMNIA: Primary | ICD-10-CM

## 2019-10-21 DIAGNOSIS — L70.0 ACNE VULGARIS: ICD-10-CM

## 2019-10-21 RX ORDER — ZOLPIDEM TARTRATE 5 MG/1
TABLET ORAL
Qty: 30 TABLET | Refills: 0 | Status: CANCELLED | OUTPATIENT
Start: 2019-10-21

## 2019-10-21 RX ORDER — ZOLPIDEM TARTRATE 5 MG/1
TABLET ORAL
Qty: 60 TABLET | Refills: 0 | OUTPATIENT
Start: 2019-10-21

## 2019-10-21 RX ORDER — TRAMADOL HYDROCHLORIDE 50 MG/1
TABLET ORAL
Qty: 90 TABLET | Refills: 0 | OUTPATIENT
Start: 2019-10-21

## 2019-10-21 RX ORDER — ZOLPIDEM TARTRATE 5 MG/1
5 TABLET ORAL
Qty: 30 TABLET | Refills: 0 | Status: SHIPPED | OUTPATIENT
Start: 2019-10-21 | End: 2019-11-07

## 2019-10-21 RX ORDER — DAPSONE 50 MG/G
GEL TOPICAL
Qty: 90 G | Refills: 0 | Status: SHIPPED | OUTPATIENT
Start: 2019-10-21 | End: 2020-01-09 | Stop reason: ALTCHOICE

## 2019-10-21 NOTE — TELEPHONE ENCOUNTER
Requesting refill on Ambien, although I just added this medication under the reconciliation and I'm not sure if you can prescribe this  Not sure who initially prescribed this

## 2019-10-21 NOTE — TELEPHONE ENCOUNTER
FOB called to confirm he would like to go forward with testing  Confirmed his   Completed TRF and mailed to home address

## 2019-11-01 ENCOUNTER — ROUTINE PRENATAL (OUTPATIENT)
Dept: OBGYN CLINIC | Facility: CLINIC | Age: 39
End: 2019-11-01

## 2019-11-01 VITALS — WEIGHT: 140.6 LBS | BODY MASS INDEX: 24.91 KG/M2 | DIASTOLIC BLOOD PRESSURE: 74 MMHG | SYSTOLIC BLOOD PRESSURE: 102 MMHG

## 2019-11-01 DIAGNOSIS — Z34.82 PRENATAL CARE, SUBSEQUENT PREGNANCY, SECOND TRIMESTER: Primary | ICD-10-CM

## 2019-11-01 PROCEDURE — PNV: Performed by: NURSE PRACTITIONER

## 2019-11-01 NOTE — PROGRESS NOTES
OFFICE VISIT: Denies any vomiting, Cramping, VB, LOF, Edema, Domestic Violence, Smoking  + FM  Tolerating PNV  Has anatomy scan scheduled  Was started on iron supplementation due to low hgb, will recheck CBC  Pt complaining of dizziness, may be due to dehydration or low blood pressure  Recommend continuing to monitor BP at home, if BP consistently low to call office as may D/C Labetalol  Advised to stay well hydrated  Recommend increasing hydration to help with headaches as well as Magnesium and Riboflavin  Currently taking Zofran PRN for nausea, denies any vomiting  RTO 4 weeks or sooner as needed

## 2019-11-04 DIAGNOSIS — M54.2 CERVICALGIA: ICD-10-CM

## 2019-11-05 ENCOUNTER — TELEPHONE (OUTPATIENT)
Dept: FAMILY MEDICINE CLINIC | Facility: CLINIC | Age: 39
End: 2019-11-05

## 2019-11-05 DIAGNOSIS — R51.9 PREGNANCY HEADACHE IN SECOND TRIMESTER: Primary | ICD-10-CM

## 2019-11-05 DIAGNOSIS — O26.892 PREGNANCY HEADACHE IN SECOND TRIMESTER: Primary | ICD-10-CM

## 2019-11-05 DIAGNOSIS — I10 ESSENTIAL HYPERTENSION: ICD-10-CM

## 2019-11-05 RX ORDER — BUTALBITAL, ACETAMINOPHEN AND CAFFEINE 50; 325; 40 MG/1; MG/1; MG/1
1 TABLET ORAL EVERY 12 HOURS PRN
Qty: 2 TABLET | Refills: 0 | Status: SHIPPED | OUTPATIENT
Start: 2019-11-05 | End: 2019-11-22 | Stop reason: ALTCHOICE

## 2019-11-05 RX ORDER — OXYCODONE HYDROCHLORIDE AND ACETAMINOPHEN 5; 325 MG/1; MG/1
1 TABLET ORAL 2 TIMES DAILY
Qty: 60 TABLET | Refills: 0 | Status: SHIPPED | OUTPATIENT
Start: 2019-11-05 | End: 2019-12-02 | Stop reason: SDUPTHER

## 2019-11-05 NOTE — TELEPHONE ENCOUNTER
Pt called in with concern of 3 day headache  Pt is weaning off BP meds, called PCP with c/o of elevated BP  Pt took BP and called back with reading of 134/88  Spoke with Eleanor Jha, we will call in [address]  Pt has requested note for work  Discussed that she was not informed to d/c labetolol and to start again  She is also aware that a lower BP (as discussed at appt) happens in 2nd trimester of pregnancy and she is to monitor at home  Pt is aware if this medication does not discontinue headache then she is to be seen in office for follow up  Rx sent to Eleanor Jha to sign

## 2019-11-05 NOTE — TELEPHONE ENCOUNTER
Pt asking if you can lower labetalol  Took 1/2 pill- 50mg and BP still too low  But without taking for 3 days its creeping back up 140/90  Is there an alternative? She said you can message her through Sw[employer]

## 2019-11-07 ENCOUNTER — TRANSCRIBE ORDERS (OUTPATIENT)
Dept: LAB | Facility: HOSPITAL | Age: 39
End: 2019-11-07

## 2019-11-07 ENCOUNTER — ROUTINE PRENATAL (OUTPATIENT)
Dept: OBGYN CLINIC | Facility: CLINIC | Age: 39
End: 2019-11-07

## 2019-11-07 ENCOUNTER — APPOINTMENT (OUTPATIENT)
Dept: LAB | Facility: HOSPITAL | Age: 39
End: 2019-11-07
Payer: COMMERCIAL

## 2019-11-07 VITALS
SYSTOLIC BLOOD PRESSURE: 120 MMHG | BODY MASS INDEX: 25.69 KG/M2 | DIASTOLIC BLOOD PRESSURE: 86 MMHG | WEIGHT: 145 LBS | HEIGHT: 63 IN

## 2019-11-07 DIAGNOSIS — Z34.82 PRENATAL CARE, SUBSEQUENT PREGNANCY, SECOND TRIMESTER: ICD-10-CM

## 2019-11-07 DIAGNOSIS — R51.9 PREGNANCY HEADACHE IN SECOND TRIMESTER: ICD-10-CM

## 2019-11-07 DIAGNOSIS — Z20.828 PARVOVIRUS EXPOSURE: ICD-10-CM

## 2019-11-07 DIAGNOSIS — Z34.82 PRENATAL CARE, SUBSEQUENT PREGNANCY, SECOND TRIMESTER: Primary | ICD-10-CM

## 2019-11-07 DIAGNOSIS — O26.892 PREGNANCY HEADACHE IN SECOND TRIMESTER: ICD-10-CM

## 2019-11-07 PROBLEM — O99.012 ANEMIA DURING PREGNANCY IN SECOND TRIMESTER: Status: ACTIVE | Noted: 2019-11-07

## 2019-11-07 LAB
BASOPHILS # BLD AUTO: 0.02 THOUSANDS/ΜL (ref 0–0.1)
BASOPHILS NFR BLD AUTO: 0 % (ref 0–1)
EOSINOPHIL # BLD AUTO: 0.15 THOUSAND/ΜL (ref 0–0.61)
EOSINOPHIL NFR BLD AUTO: 2 % (ref 0–6)
ERYTHROCYTE [DISTWIDTH] IN BLOOD BY AUTOMATED COUNT: 16.8 % (ref 11.6–15.1)
HCT VFR BLD AUTO: 30.5 % (ref 34.8–46.1)
HGB BLD-MCNC: 9.3 G/DL (ref 11.5–15.4)
IMM GRANULOCYTES # BLD AUTO: 0.02 THOUSAND/UL (ref 0–0.2)
IMM GRANULOCYTES NFR BLD AUTO: 0 % (ref 0–2)
LYMPHOCYTES # BLD AUTO: 1.33 THOUSANDS/ΜL (ref 0.6–4.47)
LYMPHOCYTES NFR BLD AUTO: 20 % (ref 14–44)
MCH RBC QN AUTO: 26.3 PG (ref 26.8–34.3)
MCHC RBC AUTO-ENTMCNC: 30.5 G/DL (ref 31.4–37.4)
MCV RBC AUTO: 86 FL (ref 82–98)
MONOCYTES # BLD AUTO: 0.53 THOUSAND/ΜL (ref 0.17–1.22)
MONOCYTES NFR BLD AUTO: 8 % (ref 4–12)
NEUTROPHILS # BLD AUTO: 4.5 THOUSANDS/ΜL (ref 1.85–7.62)
NEUTS SEG NFR BLD AUTO: 70 % (ref 43–75)
NRBC BLD AUTO-RTO: 0 /100 WBCS
PLATELET # BLD AUTO: 304 THOUSANDS/UL (ref 149–390)
PMV BLD AUTO: 9.2 FL (ref 8.9–12.7)
RBC # BLD AUTO: 3.54 MILLION/UL (ref 3.81–5.12)
T4 FREE SERPL-MCNC: 0.96 NG/DL (ref 0.76–1.46)
TSH SERPL DL<=0.05 MIU/L-ACNC: 1.29 UIU/ML (ref 0.36–3.74)
WBC # BLD AUTO: 6.55 THOUSAND/UL (ref 4.31–10.16)

## 2019-11-07 PROCEDURE — 36415 COLL VENOUS BLD VENIPUNCTURE: CPT | Performed by: NURSE PRACTITIONER

## 2019-11-07 PROCEDURE — 85025 COMPLETE CBC W/AUTO DIFF WBC: CPT | Performed by: NURSE PRACTITIONER

## 2019-11-07 PROCEDURE — 84443 ASSAY THYROID STIM HORMONE: CPT

## 2019-11-07 PROCEDURE — 84439 ASSAY OF FREE THYROXINE: CPT

## 2019-11-07 PROCEDURE — PNV: Performed by: PHYSICIAN ASSISTANT

## 2019-11-07 PROCEDURE — 86747 PARVOVIRUS ANTIBODY: CPT

## 2019-11-07 RX ORDER — SODIUM CHLORIDE 9 MG/ML
20 INJECTION, SOLUTION INTRAVENOUS ONCE
Status: CANCELLED | OUTPATIENT
Start: 2019-11-15

## 2019-11-07 NOTE — PROGRESS NOTES
Visit: Patient reports headaches worsening  Frontal headache that spans to upper right side of head as well as left side down to neck  Currently 3/10 but has been up to 10/10  Has had for several days, waxes and wanes  Currently taking Magnesium, Riboflavin  Rober Li yesterday was ok for a few hours and then came back again  Took Fioricet again and did not help at all  Denies visual changes, paresthesias or weakness  Prior to pregnancy was drinking 3-4 coffees a day now just having occasional soda which does not seem to help  Tylenol doesn't touch the headache and does not improve with rest    Patient currently on Percocet 1-2 x's a day secondary to chronic back pain as well as Tramadol trying to wean down  Chronic HTN currently on Labetalol, blood pressures have been normal for the most part, a few days ago had 1 BP that was 144/88  Taking Zofran as needed for nausea  Reports when headache was at its worst a few days ago was having tightening pain in uterus, did not last long and not currently having  Reviewed s/s of  labor, reviewed hydration  Mild swelling in lower legs  No FM, vomiting, VB, LOF, domestic violence, or smoking  Tolerating PNV  Reviewed case with Dr Froilan Irwin in length  At this time there is nothing else we can prescribe her for her headaches, sound to be tension type headaches recommend considering chiropractor or massage therapist  Can also consider following up with Dr You Olivia headaches specialist, referral entered  Patient will also get TFTs done as well as CBC and Parvo (secondary to possible exposure)  Reports still feeling very fatigued difficult to get out of bed  Patient requests work note, unable to work secondary to headaches Tuesday and today  Note given  Continue routine prenatal care  Return to office in 4 weeks for ob check

## 2019-11-07 NOTE — LETTER
November 7, 2019     Patient: Jaelyn Burton   YOB: 1980   Date of Visit: 11/7/2019       To Whom it May Concern:    Silvio Jameson is under my professional care  She was seen in my office on 11/7/2019  Please excuse patient from work on Tuesday November 5th, Thursday November 7th and Friday November 8th  She may return to work on 11/11/2019  If you have any questions or concerns, please don't hesitate to call           Sincerely,          Cherry Son PA-C

## 2019-11-08 LAB
B19V IGG SER IA-ACNC: 6.5 INDEX (ref 0–0.8)
B19V IGM SER IA-ACNC: 0.1 INDEX (ref 0–0.8)

## 2019-11-12 ENCOUNTER — TELEPHONE (OUTPATIENT)
Dept: OBGYN CLINIC | Facility: CLINIC | Age: 39
End: 2019-11-12

## 2019-11-12 NOTE — TELEPHONE ENCOUNTER
11/07/2019Criss FINCH PA-C per provider patient will require Venofer Infusion  For Hemoglobin of9 3 on 11/07/2019, patient has appt this week [facility]

## 2019-11-15 ENCOUNTER — HOSPITAL ENCOUNTER (OUTPATIENT)
Dept: INFUSION CENTER | Facility: HOSPITAL | Age: 39
Discharge: HOME | End: 2019-11-15
Attending: OBSTETRICS & GYNECOLOGY

## 2019-11-19 ENCOUNTER — HOSPITAL ENCOUNTER (OUTPATIENT)
Dept: INFUSION CENTER | Facility: HOSPITAL | Age: 39
Discharge: HOME | End: 2019-11-19
Payer: COMMERCIAL

## 2019-11-19 VITALS
HEART RATE: 79 BPM | OXYGEN SATURATION: 100 % | TEMPERATURE: 97.6 F | RESPIRATION RATE: 20 BRPM | SYSTOLIC BLOOD PRESSURE: 136 MMHG | DIASTOLIC BLOOD PRESSURE: 80 MMHG

## 2019-11-19 DIAGNOSIS — O99.012 ANEMIA DURING PREGNANCY IN SECOND TRIMESTER: Primary | ICD-10-CM

## 2019-11-19 PROCEDURE — 96365 THER/PROPH/DIAG IV INF INIT: CPT

## 2019-11-19 RX ORDER — SODIUM CHLORIDE 9 MG/ML
20 INJECTION, SOLUTION INTRAVENOUS ONCE
Status: COMPLETED | OUTPATIENT
Start: 2019-11-19 | End: 2019-11-19

## 2019-11-19 RX ORDER — SODIUM CHLORIDE 9 MG/ML
20 INJECTION, SOLUTION INTRAVENOUS ONCE
Status: CANCELLED | OUTPATIENT
Start: 2019-11-26

## 2019-11-19 RX ADMIN — SODIUM CHLORIDE 20 ML/HR: 0.9 INJECTION, SOLUTION INTRAVENOUS at 14:52

## 2019-11-19 RX ADMIN — IRON SUCROSE 200 MG: 20 INJECTION, SOLUTION INTRAVENOUS at 14:53

## 2019-11-22 ENCOUNTER — PROCEDURE VISIT (OUTPATIENT)
Dept: FAMILY MEDICINE CLINIC | Facility: CLINIC | Age: 39
End: 2019-11-22
Payer: COMMERCIAL

## 2019-11-22 VITALS
WEIGHT: 146 LBS | TEMPERATURE: 98.2 F | HEART RATE: 78 BPM | RESPIRATION RATE: 18 BRPM | DIASTOLIC BLOOD PRESSURE: 78 MMHG | SYSTOLIC BLOOD PRESSURE: 106 MMHG | BODY MASS INDEX: 25.86 KG/M2 | OXYGEN SATURATION: 98 %

## 2019-11-22 DIAGNOSIS — M54.2 CERVICALGIA: Primary | ICD-10-CM

## 2019-11-22 PROCEDURE — 98925 OSTEOPATH MANJ 1-2 REGIONS: CPT | Performed by: FAMILY MEDICINE

## 2019-11-26 ENCOUNTER — HOSPITAL ENCOUNTER (OUTPATIENT)
Dept: INFUSION CENTER | Facility: HOSPITAL | Age: 39
Discharge: HOME | End: 2019-11-26
Attending: OBSTETRICS & GYNECOLOGY

## 2019-11-27 ENCOUNTER — TELEPHONE (OUTPATIENT)
Dept: PERINATAL CARE | Facility: CLINIC | Age: 39
End: 2019-11-27

## 2019-11-27 DIAGNOSIS — Z3A.20 20 WEEKS GESTATION OF PREGNANCY: Primary | ICD-10-CM

## 2019-11-27 NOTE — TELEPHONE ENCOUNTER
I called to rescsteffanieule Gordon Patrick for her detailed level 2 scan in [facility]  I left her a message the soonest appointment at any office I could offer her if 12/5/19 at 1 PM in our [facility] office  I gave her detailed directions regarding the time, date, prep and location of the appointment  I also gave her my direct number to call if she cannot make that time and we will find another time that works

## 2019-11-29 ENCOUNTER — TELEPHONE (OUTPATIENT)
Dept: OBGYN CLINIC | Facility: CLINIC | Age: 39
End: 2019-11-29

## 2019-11-29 ENCOUNTER — TELEPHONE (OUTPATIENT)
Dept: HEMATOLOGY ONCOLOGY | Facility: CLINIC | Age: 39
End: 2019-11-29

## 2019-11-29 ENCOUNTER — HOSPITAL ENCOUNTER (OUTPATIENT)
Dept: INFUSION CENTER | Facility: HOSPITAL | Age: 39
Discharge: HOME | End: 2019-11-29
Attending: OBSTETRICS & GYNECOLOGY

## 2019-11-29 NOTE — TELEPHONE ENCOUNTER
Spoke with Sima Homans at [facility]   Jean-Pierre Paige states she has offered patient several different times and informed that Saturday are not an available day   If patient would like a Saturday appt she will need to be seen at the [address] [facility] during the week for her first appt and than as long as she does not have a reaction she can be seen on a Saturday   Jean-Pierre Paige just wanted to keep us informed    Will await call from patient  JORGE LUIS ZUNIGA

## 2019-11-29 NOTE — TELEPHONE ENCOUNTER
Patient called in to change her infusion appointments to [facility], went to conference patient in and patient had hung up  Lucretia at [facility] will call her back

## 2019-11-30 DIAGNOSIS — I10 ESSENTIAL HYPERTENSION: ICD-10-CM

## 2019-12-02 DIAGNOSIS — O21.9 PREGNANCY RELATED NAUSEA AND VOMITING, ANTEPARTUM: ICD-10-CM

## 2019-12-02 DIAGNOSIS — M54.2 CERVICALGIA: ICD-10-CM

## 2019-12-02 DIAGNOSIS — I10 ESSENTIAL HYPERTENSION: ICD-10-CM

## 2019-12-02 DIAGNOSIS — E55.9 VITAMIN D DEFICIENCY: ICD-10-CM

## 2019-12-02 LAB
MISCELLANEOUS LAB TEST RESULT: NORMAL
MISCELLANEOUS LAB TEST RESULT: NORMAL

## 2019-12-02 RX ORDER — ONDANSETRON HYDROCHLORIDE 8 MG/1
TABLET, FILM COATED ORAL
Qty: 60 TABLET | Refills: 0 | Status: SHIPPED | OUTPATIENT
Start: 2019-12-02 | End: 2019-12-30 | Stop reason: SDUPTHER

## 2019-12-02 RX ORDER — ASPIRIN 81 MG/1
TABLET, COATED ORAL
Qty: 60 TABLET | Refills: 0 | Status: SHIPPED | OUTPATIENT
Start: 2019-12-02 | End: 2019-12-31 | Stop reason: SDUPTHER

## 2019-12-03 DIAGNOSIS — Z87.59 H/O MIGRAINE DURING PREGNANCY: ICD-10-CM

## 2019-12-03 DIAGNOSIS — Z87.59 H/O MIGRAINE DURING PREGNANCY: Primary | ICD-10-CM

## 2019-12-03 DIAGNOSIS — Z86.69 H/O MIGRAINE DURING PREGNANCY: ICD-10-CM

## 2019-12-03 DIAGNOSIS — Z86.69 H/O MIGRAINE DURING PREGNANCY: Primary | ICD-10-CM

## 2019-12-03 RX ORDER — LABETALOL 100 MG/1
TABLET, FILM COATED ORAL
Qty: 60 TABLET | Refills: 0 | Status: SHIPPED | OUTPATIENT
Start: 2019-12-03 | End: 2019-12-11

## 2019-12-03 RX ORDER — OXYCODONE HYDROCHLORIDE AND ACETAMINOPHEN 5; 325 MG/1; MG/1
1 TABLET ORAL 2 TIMES DAILY
Qty: 60 TABLET | Refills: 0 | Status: SHIPPED | OUTPATIENT
Start: 2019-12-03 | End: 2019-12-31 | Stop reason: SDUPTHER

## 2019-12-03 RX ORDER — BUTALBITAL, ACETAMINOPHEN AND CAFFEINE 50; 325; 40 MG/1; MG/1; MG/1
1 TABLET ORAL EVERY 4 HOURS PRN
Qty: 15 TABLET | Refills: 0 | Status: SHIPPED | OUTPATIENT
Start: 2019-12-03 | End: 2019-12-03 | Stop reason: SDUPTHER

## 2019-12-03 RX ORDER — BUTALBITAL, ACETAMINOPHEN AND CAFFEINE 50; 325; 40 MG/1; MG/1; MG/1
1 TABLET ORAL EVERY 4 HOURS PRN
Qty: 15 TABLET | Refills: 0 | Status: SHIPPED | OUTPATIENT
Start: 2019-12-03 | End: 2020-01-09 | Stop reason: ALTCHOICE

## 2019-12-03 RX ORDER — LABETALOL 100 MG/1
100 TABLET, FILM COATED ORAL 2 TIMES DAILY
Qty: 60 TABLET | Refills: 0 | Status: SHIPPED | OUTPATIENT
Start: 2019-12-03 | End: 2019-12-30 | Stop reason: SDUPTHER

## 2019-12-03 RX ORDER — ERGOCALCIFEROL 1.25 MG/1
50000 CAPSULE ORAL WEEKLY
Qty: 8 CAPSULE | Refills: 0 | Status: SHIPPED | OUTPATIENT
Start: 2019-12-03 | End: 2020-01-29 | Stop reason: SDUPTHER

## 2019-12-08 DIAGNOSIS — G47.09 OTHER INSOMNIA: Primary | ICD-10-CM

## 2019-12-08 DIAGNOSIS — M54.2 CERVICALGIA: ICD-10-CM

## 2019-12-09 ENCOUNTER — ROUTINE PRENATAL (OUTPATIENT)
Dept: OBGYN CLINIC | Facility: CLINIC | Age: 39
End: 2019-12-09

## 2019-12-09 VITALS — BODY MASS INDEX: 26.57 KG/M2 | SYSTOLIC BLOOD PRESSURE: 108 MMHG | DIASTOLIC BLOOD PRESSURE: 68 MMHG | WEIGHT: 150 LBS

## 2019-12-09 DIAGNOSIS — Z34.82 PRENATAL CARE, SUBSEQUENT PREGNANCY, SECOND TRIMESTER: Primary | ICD-10-CM

## 2019-12-09 PROCEDURE — PNV: Performed by: NURSE PRACTITIONER

## 2019-12-09 RX ORDER — ZOLPIDEM TARTRATE 10 MG/1
10 TABLET ORAL
Qty: 30 TABLET | Refills: 0 | Status: SHIPPED | OUTPATIENT
Start: 2019-12-09 | End: 2020-01-14

## 2019-12-09 RX ORDER — TRAMADOL HYDROCHLORIDE 50 MG/1
TABLET ORAL
Qty: 90 TABLET | Refills: 0 | OUTPATIENT
Start: 2019-12-09

## 2019-12-09 RX ORDER — TRAMADOL HYDROCHLORIDE 50 MG/1
50 TABLET ORAL EVERY 8 HOURS PRN
Qty: 90 TABLET | Refills: 0 | Status: SHIPPED | OUTPATIENT
Start: 2019-12-09 | End: 2020-01-06 | Stop reason: SDUPTHER

## 2019-12-09 NOTE — TELEPHONE ENCOUNTER
Patient has been unable to sleep   She would like her ambien back a shirt course she has not slept in 4 nights

## 2019-12-09 NOTE — PROGRESS NOTES
OFFICE VISIT: Still having nausea and vomiting on occasion  Will take Zofran as needed, seems to help  Still complaining of headaches  Has tried chiropractor and Fioricet with no relief  Does take Magnesium and Riboflavin  Still needs to schedule appt with neurologist  Recently started having insomnia  Has tried Benedryl, Unisom, and Melatonin with no relief  Denies any  Cramping, VB, LOF, Edema, Domestic Violence, Smoking  + FM  Had anatomy scan at [facility]  Has follow up at 28 weeks scheduled  BP WNL  Tolerating PNV  Urine neg/neg  RTO 4 weeks or sooner as needed

## 2019-12-11 ENCOUNTER — PROCEDURE VISIT (OUTPATIENT)
Dept: FAMILY MEDICINE CLINIC | Facility: CLINIC | Age: 39
End: 2019-12-11

## 2019-12-11 VITALS
SYSTOLIC BLOOD PRESSURE: 112 MMHG | WEIGHT: 153 LBS | BODY MASS INDEX: 27.1 KG/M2 | HEART RATE: 84 BPM | OXYGEN SATURATION: 99 % | DIASTOLIC BLOOD PRESSURE: 70 MMHG | TEMPERATURE: 98.1 F | RESPIRATION RATE: 18 BRPM

## 2019-12-11 DIAGNOSIS — M54.2 CERVICALGIA: Primary | ICD-10-CM

## 2019-12-11 RX ORDER — SODIUM CHLORIDE 9 MG/ML
20 INJECTION, SOLUTION INTRAVENOUS ONCE
Status: CANCELLED | OUTPATIENT
Start: 2019-12-14

## 2019-12-11 RX ORDER — BUTALBITAL/ASPIRIN/CAFFEINE 50-325-40
1 CAPSULE ORAL EVERY 4 HOURS PRN
COMMUNITY
End: 2020-02-06 | Stop reason: SDUPTHER

## 2019-12-11 RX ORDER — VITAMIN A, VITAMIN C, VITAMIN D-3, VITAMIN E, VITAMIN B-1, VITAMIN B-2, NIACIN, VITAMIN B-6, CALCIUM, IRON, ZINC, COPPER 4000; 120; 400; 22; 1.84; 3; 20; 10; 1; 12; 200; 27; 25; 2 [IU]/1; MG/1; [IU]/1; MG/1; MG/1; MG/1; MG/1; MG/1; MG/1; UG/1; MG/1; MG/1; MG/1; MG/1
1 TABLET ORAL DAILY
COMMUNITY
End: 2022-05-12 | Stop reason: ALTCHOICE

## 2019-12-14 ENCOUNTER — TELEPHONE (OUTPATIENT)
Dept: INFUSION CENTER | Facility: CLINIC | Age: 39
End: 2019-12-14

## 2019-12-14 ENCOUNTER — HOSPITAL ENCOUNTER (OUTPATIENT)
Dept: INFUSION CENTER | Facility: CLINIC | Age: 39
End: 2019-12-14

## 2019-12-16 ENCOUNTER — TELEPHONE (OUTPATIENT)
Dept: OBGYN CLINIC | Facility: CLINIC | Age: 39
End: 2019-12-16

## 2019-12-16 NOTE — TELEPHONE ENCOUNTER
Pt called in asking what could happen if she does not have iron infusions  Spoke with Fran Breen, as per [facility] [place], there could be issues to the baby, or needing blood transfusion during delivery  Pt then expressed concerned about work schedule and trying to schedule infusions  I then asked if there was any possible way she could speak with work or have her, per autumn schedule, accommodate her doctor's appointments  Patient is taking OTC iron medication, I offered any further assistance that we could give her  She will keep us updated

## 2019-12-17 ENCOUNTER — TELEPHONE (OUTPATIENT)
Dept: CARDIOLOGY CLINIC | Facility: CLINIC | Age: 39
End: 2019-12-17

## 2019-12-21 ENCOUNTER — HOSPITAL ENCOUNTER (OUTPATIENT)
Dept: INFUSION CENTER | Facility: CLINIC | Age: 39
Discharge: HOME | End: 2019-12-21
Payer: COMMERCIAL

## 2019-12-21 VITALS
TEMPERATURE: 98.7 F | SYSTOLIC BLOOD PRESSURE: 112 MMHG | DIASTOLIC BLOOD PRESSURE: 76 MMHG | HEART RATE: 70 BPM | RESPIRATION RATE: 16 BRPM | OXYGEN SATURATION: 99 %

## 2019-12-21 DIAGNOSIS — O99.012 ANEMIA DURING PREGNANCY IN SECOND TRIMESTER: Primary | ICD-10-CM

## 2019-12-21 PROCEDURE — 96365 THER/PROPH/DIAG IV INF INIT: CPT

## 2019-12-21 RX ORDER — SODIUM CHLORIDE 9 MG/ML
20 INJECTION, SOLUTION INTRAVENOUS ONCE
Status: COMPLETED | OUTPATIENT
Start: 2019-12-21 | End: 2019-12-21

## 2019-12-21 RX ORDER — SODIUM CHLORIDE 9 MG/ML
20 INJECTION, SOLUTION INTRAVENOUS ONCE
Status: CANCELLED | OUTPATIENT
Start: 2019-12-28

## 2019-12-21 RX ADMIN — SODIUM CHLORIDE 20 ML/HR: 0.9 INJECTION, SOLUTION INTRAVENOUS at 14:49

## 2019-12-21 RX ADMIN — IRON SUCROSE 200 MG: 20 INJECTION, SOLUTION INTRAVENOUS at 14:50

## 2019-12-23 ENCOUNTER — IMMUNIZATIONS (OUTPATIENT)
Dept: FAMILY MEDICINE CLINIC | Facility: CLINIC | Age: 39
End: 2019-12-23
Payer: COMMERCIAL

## 2019-12-23 DIAGNOSIS — Z23 ENCOUNTER FOR IMMUNIZATION: ICD-10-CM

## 2019-12-23 PROCEDURE — 90471 IMMUNIZATION ADMIN: CPT

## 2019-12-23 PROCEDURE — 90686 IIV4 VACC NO PRSV 0.5 ML IM: CPT

## 2019-12-28 ENCOUNTER — HOSPITAL ENCOUNTER (OUTPATIENT)
Dept: INFUSION CENTER | Facility: CLINIC | Age: 39
Discharge: HOME | End: 2019-12-28
Payer: COMMERCIAL

## 2019-12-28 VITALS
DIASTOLIC BLOOD PRESSURE: 78 MMHG | TEMPERATURE: 99.1 F | RESPIRATION RATE: 14 BRPM | SYSTOLIC BLOOD PRESSURE: 110 MMHG | OXYGEN SATURATION: 98 % | HEART RATE: 99 BPM

## 2019-12-28 DIAGNOSIS — O99.012 ANEMIA DURING PREGNANCY IN SECOND TRIMESTER: Primary | ICD-10-CM

## 2019-12-28 PROCEDURE — 96365 THER/PROPH/DIAG IV INF INIT: CPT

## 2019-12-28 RX ORDER — SODIUM CHLORIDE 9 MG/ML
20 INJECTION, SOLUTION INTRAVENOUS ONCE
Status: CANCELLED | OUTPATIENT
Start: 2020-01-03

## 2019-12-28 RX ORDER — SODIUM CHLORIDE 9 MG/ML
20 INJECTION, SOLUTION INTRAVENOUS ONCE
Status: COMPLETED | OUTPATIENT
Start: 2019-12-28 | End: 2019-12-28

## 2019-12-28 RX ADMIN — IRON SUCROSE 200 MG: 20 INJECTION, SOLUTION INTRAVENOUS at 10:38

## 2019-12-28 RX ADMIN — SODIUM CHLORIDE 20 ML/HR: 0.9 INJECTION, SOLUTION INTRAVENOUS at 10:38

## 2019-12-28 NOTE — PROGRESS NOTES
Patient tolerated treatment today without issues  Patient will call to reschedule her next appointment as she has a schedule conflict  AVS declined

## 2019-12-29 DIAGNOSIS — M54.2 CERVICALGIA: ICD-10-CM

## 2019-12-30 DIAGNOSIS — I10 ESSENTIAL HYPERTENSION: ICD-10-CM

## 2019-12-30 DIAGNOSIS — O21.9 PREGNANCY RELATED NAUSEA AND VOMITING, ANTEPARTUM: ICD-10-CM

## 2019-12-30 RX ORDER — LABETALOL 100 MG/1
TABLET, FILM COATED ORAL
Qty: 120 TABLET | Refills: 0 | Status: SHIPPED | OUTPATIENT
Start: 2019-12-30 | End: 2020-02-06 | Stop reason: ALTCHOICE

## 2019-12-30 RX ORDER — LABETALOL 100 MG/1
TABLET, FILM COATED ORAL
Qty: 60 TABLET | Refills: 0 | Status: SHIPPED | OUTPATIENT
Start: 2019-12-30 | End: 2020-01-09 | Stop reason: ALTCHOICE

## 2019-12-30 RX ORDER — ONDANSETRON HYDROCHLORIDE 8 MG/1
TABLET, FILM COATED ORAL
Qty: 60 TABLET | Refills: 0 | Status: SHIPPED | OUTPATIENT
Start: 2019-12-30 | End: 2020-04-10 | Stop reason: ALTCHOICE

## 2019-12-31 DIAGNOSIS — M54.2 CERVICALGIA: ICD-10-CM

## 2019-12-31 DIAGNOSIS — I10 ESSENTIAL HYPERTENSION: ICD-10-CM

## 2019-12-31 RX ORDER — OXYCODONE HYDROCHLORIDE AND ACETAMINOPHEN 5; 325 MG/1; MG/1
1 TABLET ORAL 2 TIMES DAILY
Qty: 60 TABLET | Refills: 0 | Status: SHIPPED | OUTPATIENT
Start: 2019-12-31 | End: 2020-01-29 | Stop reason: SDUPTHER

## 2019-12-31 RX ORDER — ASPIRIN 81 MG/1
162 TABLET ORAL DAILY
Qty: 60 TABLET | Refills: 0 | Status: SHIPPED | OUTPATIENT
Start: 2019-12-31 | End: 2020-02-06 | Stop reason: SDUPTHER

## 2019-12-31 RX ORDER — OXYCODONE HYDROCHLORIDE AND ACETAMINOPHEN 5; 325 MG/1; MG/1
1 TABLET ORAL 2 TIMES DAILY
Qty: 60 TABLET | Refills: 0 | OUTPATIENT
Start: 2019-12-31

## 2019-12-31 NOTE — TELEPHONE ENCOUNTER
DR Dasia Mckeon - PT IS CALLING FOR HER REFILLS  SHE WOULD LIKE THEM TODAY  SHE WILL BE GOING OUT OF TOWN THURS  NITE  SHE SAID HER OXYCODONE IS DUE TO BE REFILLED TOMORROW

## 2020-01-03 ENCOUNTER — HOSPITAL ENCOUNTER (OUTPATIENT)
Dept: INFUSION CENTER | Facility: CLINIC | Age: 40
Discharge: HOME | End: 2020-01-03
Payer: COMMERCIAL

## 2020-01-03 VITALS
RESPIRATION RATE: 16 BRPM | DIASTOLIC BLOOD PRESSURE: 70 MMHG | TEMPERATURE: 97.9 F | OXYGEN SATURATION: 98 % | HEART RATE: 78 BPM | SYSTOLIC BLOOD PRESSURE: 106 MMHG

## 2020-01-03 DIAGNOSIS — O99.012 ANEMIA DURING PREGNANCY IN SECOND TRIMESTER: Primary | ICD-10-CM

## 2020-01-03 PROCEDURE — 96365 THER/PROPH/DIAG IV INF INIT: CPT

## 2020-01-03 RX ORDER — SODIUM CHLORIDE 9 MG/ML
20 INJECTION, SOLUTION INTRAVENOUS ONCE
Status: COMPLETED | OUTPATIENT
Start: 2020-01-03 | End: 2020-01-03

## 2020-01-03 RX ORDER — SODIUM CHLORIDE 9 MG/ML
20 INJECTION, SOLUTION INTRAVENOUS ONCE
Status: CANCELLED | OUTPATIENT
Start: 2020-01-11

## 2020-01-03 RX ADMIN — IRON SUCROSE 200 MG: 20 INJECTION, SOLUTION INTRAVENOUS at 11:00

## 2020-01-03 RX ADMIN — SODIUM CHLORIDE 20 ML/HR: 0.9 INJECTION, SOLUTION INTRAVENOUS at 11:02

## 2020-01-03 NOTE — PROGRESS NOTES
Patient tolerated treatment without complications  Patient given AVS  Reviewed upcoming appointments with patient

## 2020-01-03 NOTE — PROGRESS NOTES
Patient here for venofer infusion  Patient resting with no complaints  Vitals stable  Call bell within reach  Will continue to monitor

## 2020-01-04 ENCOUNTER — HOSPITAL ENCOUNTER (OUTPATIENT)
Dept: INFUSION CENTER | Facility: CLINIC | Age: 40
End: 2020-01-04

## 2020-01-05 DIAGNOSIS — M54.2 CERVICALGIA: ICD-10-CM

## 2020-01-06 DIAGNOSIS — M54.2 CERVICALGIA: ICD-10-CM

## 2020-01-06 RX ORDER — TRAMADOL HYDROCHLORIDE 50 MG/1
50 TABLET ORAL EVERY 8 HOURS PRN
Qty: 90 TABLET | Refills: 0 | Status: SHIPPED | OUTPATIENT
Start: 2020-01-06 | End: 2020-02-07

## 2020-01-06 RX ORDER — TRAMADOL HYDROCHLORIDE 50 MG/1
TABLET ORAL
Qty: 90 TABLET | Refills: 0 | OUTPATIENT
Start: 2020-01-06

## 2020-01-09 ENCOUNTER — ROUTINE PRENATAL (OUTPATIENT)
Dept: OBGYN CLINIC | Facility: CLINIC | Age: 40
End: 2020-01-09

## 2020-01-09 VITALS — DIASTOLIC BLOOD PRESSURE: 70 MMHG | BODY MASS INDEX: 27.81 KG/M2 | SYSTOLIC BLOOD PRESSURE: 110 MMHG | WEIGHT: 157 LBS

## 2020-01-09 DIAGNOSIS — O10.919 CHRONIC HYPERTENSION IN PREGNANCY: ICD-10-CM

## 2020-01-09 DIAGNOSIS — Z34.82 PRENATAL CARE, SUBSEQUENT PREGNANCY, SECOND TRIMESTER: Primary | ICD-10-CM

## 2020-01-09 PROCEDURE — PNV: Performed by: NURSE PRACTITIONER

## 2020-01-09 NOTE — LETTER
To Whom it May Concern,    Yakelin Cruz is under our care for pregnancy  Due to complications in pregnancy we request a closer parking spot to limit her walking  Any questions or concerns do not hesitate to call      Sincerely,         NIKI Sanabria

## 2020-01-09 NOTE — PROGRESS NOTES
Assessment/Plan:    No problem-specific Assessment & Plan notes found for this encounter  Diagnoses and all orders for this visit:    Cervicalgia    Other orders  -     Prenatal Vit-Fe Fumarate-FA (PRENATAL VITAMIN PLUS LOW IRON) 27-1 MG TABS; Take 1 tablet by mouth daily  -     butalbital-acetaminophen-caffeine-codeine (FIORICET WITH CODEINE) -69-30 MG per capsule; Take 1 capsule by mouth every 4 (four) hours as needed          Subjective:      Patient ID: Katharine Otto is a 42 y o  female  Ciara Ringer is here for her OMT on her neck she is no longer having it done on her back due to her advancing pregnancy      The following portions of the patient's history were reviewed and updated as appropriate: current medications, past family history, past medical history, past social history, past surgical history and problem list     Review of Systems   Musculoskeletal: Positive for neck pain          Chronic no radicular symptoms         Objective:      /70   Pulse 84   Temp 98 1 °F (36 7 °C)   Resp 18   Wt 69 4 kg (153 lb)   LMP 07/09/2019   SpO2 99%   BMI 27 10 kg/m²          Physical Exam      Significant pair muscle tenderness and spasm muscle energy techniques used and massage techniques used to help loosen the muscles around the cervical spine  Patient declined HVLA

## 2020-01-09 NOTE — PROGRESS NOTES
OFFICE VISIT: Denies any Vomiting, Cramping, VB, LOF, Domestic Violence, Smoking  + FM  Tolerating PNV  Still having headaches, usually resolved with Tylenol and occasional Fioricet  Also getting OMT at PCP to help with headaches  Has not been able to see neurologist yet due to her schedule  Complaining of bilateral lower leg edema after working all day, request letter for closer parking spot at work, letter given  Recommend compression tights or leggings, rest, elevation  Still having nausea, mild  Unable to tolerate water  Recommend vitamin water, Gatorade, powerade  Need to stay well hydrated as can be cause of headaches in pregnancy  Rx for 28 week labs given plus 24 hr urine as recommended by MFM  Has follow up with MFM in 2 weeks  Has one more infusion left this Saturday  Recommend waiting until completes her last infusion before repeating lab work  RTO 4 weeks or sooner as needed

## 2020-01-11 ENCOUNTER — HOSPITAL ENCOUNTER (OUTPATIENT)
Dept: INFUSION CENTER | Facility: CLINIC | Age: 40
End: 2020-01-11

## 2020-01-13 ENCOUNTER — OFFICE VISIT (OUTPATIENT)
Dept: FAMILY MEDICINE CLINIC | Facility: CLINIC | Age: 40
End: 2020-01-13
Payer: COMMERCIAL

## 2020-01-13 VITALS
BODY MASS INDEX: 27.35 KG/M2 | WEIGHT: 154.4 LBS | SYSTOLIC BLOOD PRESSURE: 106 MMHG | DIASTOLIC BLOOD PRESSURE: 70 MMHG | HEART RATE: 84 BPM | TEMPERATURE: 98.2 F | OXYGEN SATURATION: 99 %

## 2020-01-13 DIAGNOSIS — M54.2 CERVICALGIA: Primary | ICD-10-CM

## 2020-01-13 DIAGNOSIS — G47.09 OTHER INSOMNIA: ICD-10-CM

## 2020-01-13 PROCEDURE — 98925 OSTEOPATH MANJ 1-2 REGIONS: CPT | Performed by: FAMILY MEDICINE

## 2020-01-14 ENCOUNTER — HOSPITAL ENCOUNTER (OUTPATIENT)
Dept: INFUSION CENTER | Facility: CLINIC | Age: 40
End: 2020-01-14

## 2020-01-14 RX ORDER — ZOLPIDEM TARTRATE 10 MG/1
TABLET ORAL
Qty: 30 TABLET | Refills: 0 | Status: SHIPPED | OUTPATIENT
Start: 2020-01-14 | End: 2020-02-06 | Stop reason: ALTCHOICE

## 2020-01-18 ENCOUNTER — HOSPITAL ENCOUNTER (OUTPATIENT)
Dept: INFUSION CENTER | Facility: CLINIC | Age: 40
Discharge: HOME | End: 2020-01-18
Payer: COMMERCIAL

## 2020-01-18 VITALS
TEMPERATURE: 98.7 F | SYSTOLIC BLOOD PRESSURE: 123 MMHG | HEART RATE: 83 BPM | OXYGEN SATURATION: 99 % | DIASTOLIC BLOOD PRESSURE: 78 MMHG | RESPIRATION RATE: 14 BRPM

## 2020-01-18 DIAGNOSIS — O99.012 ANEMIA DURING PREGNANCY IN SECOND TRIMESTER: Primary | ICD-10-CM

## 2020-01-18 PROCEDURE — 96365 THER/PROPH/DIAG IV INF INIT: CPT

## 2020-01-18 RX ORDER — SODIUM CHLORIDE 9 MG/ML
20 INJECTION, SOLUTION INTRAVENOUS ONCE
Status: CANCELLED | OUTPATIENT
Start: 2020-01-24

## 2020-01-18 RX ORDER — SODIUM CHLORIDE 9 MG/ML
20 INJECTION, SOLUTION INTRAVENOUS ONCE
Status: COMPLETED | OUTPATIENT
Start: 2020-01-18 | End: 2020-01-18

## 2020-01-18 RX ADMIN — IRON SUCROSE 200 MG: 20 INJECTION, SOLUTION INTRAVENOUS at 12:29

## 2020-01-18 RX ADMIN — SODIUM CHLORIDE 20 ML/HR: 0.9 INJECTION, SOLUTION INTRAVENOUS at 12:35

## 2020-01-20 ENCOUNTER — APPOINTMENT (OUTPATIENT)
Dept: LAB | Facility: HOSPITAL | Age: 40
End: 2020-01-20
Payer: COMMERCIAL

## 2020-01-20 ENCOUNTER — TRANSCRIBE ORDERS (OUTPATIENT)
Dept: ADMINISTRATIVE | Facility: HOSPITAL | Age: 40
End: 2020-01-20

## 2020-01-20 DIAGNOSIS — O10.919 CHRONIC HYPERTENSION IN PREGNANCY: ICD-10-CM

## 2020-01-20 DIAGNOSIS — Z34.82 PRENATAL CARE, SUBSEQUENT PREGNANCY, SECOND TRIMESTER: ICD-10-CM

## 2020-01-20 LAB
PROT 24H UR-MCNC: 128.25 MG/24 HRS (ref 40–150)
SPECIMEN VOL UR: 1425 ML

## 2020-01-20 PROCEDURE — 84156 ASSAY OF PROTEIN URINE: CPT

## 2020-01-21 ENCOUNTER — HOSPITAL ENCOUNTER (OUTPATIENT)
Facility: HOSPITAL | Age: 40
Discharge: HOME | End: 2020-01-21
Attending: OBSTETRICS & GYNECOLOGY | Admitting: OBSTETRICS & GYNECOLOGY
Payer: COMMERCIAL

## 2020-01-21 ENCOUNTER — TELEPHONE (OUTPATIENT)
Dept: OBGYN CLINIC | Facility: CLINIC | Age: 40
End: 2020-01-21

## 2020-01-21 VITALS
HEIGHT: 63 IN | OXYGEN SATURATION: 100 % | DIASTOLIC BLOOD PRESSURE: 91 MMHG | BODY MASS INDEX: 27.82 KG/M2 | SYSTOLIC BLOOD PRESSURE: 122 MMHG | HEART RATE: 86 BPM | WEIGHT: 157 LBS | RESPIRATION RATE: 18 BRPM | TEMPERATURE: 98.6 F

## 2020-01-21 PROBLEM — Z3A.28 28 WEEKS GESTATION OF PREGNANCY: Status: ACTIVE | Noted: 2020-01-21

## 2020-01-21 LAB
ALBUMIN SERPL BCP-MCNC: 2.6 G/DL (ref 3.5–5)
ALP SERPL-CCNC: 71 U/L (ref 46–116)
ALT SERPL W P-5'-P-CCNC: 13 U/L (ref 12–78)
ANION GAP SERPL CALCULATED.3IONS-SCNC: 11 MMOL/L (ref 4–13)
AST SERPL W P-5'-P-CCNC: 8 U/L (ref 5–45)
BASOPHILS # BLD AUTO: 0.02 THOUSANDS/ΜL (ref 0–0.1)
BASOPHILS NFR BLD AUTO: 0 % (ref 0–1)
BILIRUB SERPL-MCNC: 0.14 MG/DL (ref 0.2–1)
BUN SERPL-MCNC: 16 MG/DL (ref 5–25)
CALCIUM SERPL-MCNC: 8.8 MG/DL (ref 8.3–10.1)
CHLORIDE SERPL-SCNC: 104 MMOL/L (ref 100–108)
CO2 SERPL-SCNC: 22 MMOL/L (ref 21–32)
CREAT SERPL-MCNC: 0.71 MG/DL (ref 0.6–1.3)
EOSINOPHIL # BLD AUTO: 0.07 THOUSAND/ΜL (ref 0–0.61)
EOSINOPHIL NFR BLD AUTO: 1 % (ref 0–6)
ERYTHROCYTE [DISTWIDTH] IN BLOOD BY AUTOMATED COUNT: 20.7 % (ref 11.6–15.1)
GFR SERPL CREATININE-BSD FRML MDRD: 109 ML/MIN/1.73SQ M
GLUCOSE SERPL-MCNC: 93 MG/DL (ref 65–140)
HCT VFR BLD AUTO: 33.7 % (ref 34.8–46.1)
HGB BLD-MCNC: 10.8 G/DL (ref 11.5–15.4)
IMM GRANULOCYTES # BLD AUTO: 0.03 THOUSAND/UL (ref 0–0.2)
IMM GRANULOCYTES NFR BLD AUTO: 0 % (ref 0–2)
LYMPHOCYTES # BLD AUTO: 1.7 THOUSANDS/ΜL (ref 0.6–4.47)
LYMPHOCYTES NFR BLD AUTO: 25 % (ref 14–44)
MCH RBC QN AUTO: 30.3 PG (ref 26.8–34.3)
MCHC RBC AUTO-ENTMCNC: 32 G/DL (ref 31.4–37.4)
MCV RBC AUTO: 94 FL (ref 82–98)
MONOCYTES # BLD AUTO: 0.65 THOUSAND/ΜL (ref 0.17–1.22)
MONOCYTES NFR BLD AUTO: 9 % (ref 4–12)
NEUTROPHILS # BLD AUTO: 4.47 THOUSANDS/ΜL (ref 1.85–7.62)
NEUTS SEG NFR BLD AUTO: 65 % (ref 43–75)
NRBC BLD AUTO-RTO: 0 /100 WBCS
PLATELET # BLD AUTO: 237 THOUSANDS/UL (ref 149–390)
PMV BLD AUTO: 9.2 FL (ref 8.9–12.7)
POTASSIUM SERPL-SCNC: 3.5 MMOL/L (ref 3.5–5.3)
PROT SERPL-MCNC: 6.1 G/DL (ref 6.4–8.2)
RBC # BLD AUTO: 3.57 MILLION/UL (ref 3.81–5.12)
SODIUM SERPL-SCNC: 137 MMOL/L (ref 136–145)
WBC # BLD AUTO: 6.94 THOUSAND/UL (ref 4.31–10.16)

## 2020-01-21 PROCEDURE — 80053 COMPREHEN METABOLIC PANEL: CPT | Performed by: FAMILY MEDICINE

## 2020-01-21 PROCEDURE — NC001 PR NO CHARGE: Performed by: FAMILY MEDICINE

## 2020-01-21 PROCEDURE — 85025 COMPLETE CBC W/AUTO DIFF WBC: CPT | Performed by: FAMILY MEDICINE

## 2020-01-21 PROCEDURE — 99214 OFFICE O/P EST MOD 30 MIN: CPT

## 2020-01-21 NOTE — LETTER
[address] AND DELIVERY  [address] [address] [id number]  Dept: [phone]    January 21, 2020     Patient: Jaelyn Burton   YOB: 1980   Date of Visit: 1/21/2020       To Whom it May Concern:    Silvio Jameson is under my professional care  She was seen in the hospital from 1am-3am 1/21/2020   to 01/21/20  She may return to work on 1/21 without limitations  If you have any questions or concerns, please don't hesitate to call           Sincerely,          Chong Machado, DO

## 2020-01-21 NOTE — DISCHARGE INSTRUCTIONS
Pregnancy at 27 to 30 [address]:   You may notice new symptoms such as shortness of breath, heartburn, or swelling of your ankles and feet  You may also have trouble sleeping or contractions  DISCHARGE INSTRUCTIONS:   Seek care immediately if:   · You develop a severe headache that does not go away  · You have new or increased vision changes, such as blurred or spotted vision  · You have new or increased swelling in your face or hands  · You have vaginal spotting or bleeding  · Your water broke or you feel warm water gushing or trickling from your vagina  Contact your healthcare provider if:   · You have more than 5 contractions in 1 hour  · You notice any changes in your baby's movements  · You have abdominal cramps, pressure, or tightening  · You have a change in vaginal discharge  · You have chills or a fever  · You have vaginal itching, burning, or pain  · You have yellow, green, white, or foul-smelling vaginal discharge  · You have pain or burning when you urinate, less urine than usual, or pink or bloody urine  · You have questions or concerns about your condition or care  How to care for yourself at this stage of your pregnancy:   · Eat a variety of healthy foods  Healthy foods include fruits, vegetables, whole-grain breads, low-fat dairy foods, beans, lean meats, and fish  Drink liquids as directed  Ask how much liquid to drink each day and which liquids are best for you  Limit caffeine to less than 200 milligrams each day  Limit your intake of fish to 2 servings each week  Choose fish low in mercury such as canned light tuna, shrimp, salmon, cod, or tilapia  Do not  eat fish high in mercury such as swordfish, tilefish, mariaelena mackerel, and shark  · Manage heartburn  by eating 4 or 5 small meals each day instead of large meals  Avoid spicy food  · Manage swelling  by lying down and putting your feet up       · Take prenatal vitamins as directed  Your need for certain vitamins and minerals, such as folic acid, increases during pregnancy  Prenatal vitamins provide some of the extra vitamins and minerals you need  Prenatal vitamins may also help to decrease the risk of certain birth defects  · Talk to your healthcare provider about exercise  Moderate exercise can help you stay fit  Your healthcare provider will help you plan an exercise program that is safe for you during pregnancy  · Do not smoke  If you smoke, it is never too late to quit  Smoking increases your risk of a miscarriage and other health problems during your pregnancy  Smoking can cause your baby to be born too early or weigh less at birth  Ask your healthcare provider for information if you need help quitting  · Do not drink alcohol  Alcohol passes from your body to your baby through the placenta  It can affect your baby's brain development and cause fetal alcohol syndrome (FAS)  FAS is a group of conditions that causes mental, behavior, and growth problems  · Talk to your healthcare provider before you take any medicines  Many medicines may harm your baby if you take them when you are pregnant  Do not take any medicines, vitamins, herbs, or supplements without first talking to your healthcare provider  Never use illegal or street drugs (such as marijuana or cocaine) while you are pregnant  Safety tips during pregnancy:   · Avoid hot tubs and saunas  Do not use a hot tub or sauna while you are pregnant, especially during your first trimester  Hot tubs and saunas may raise your baby's temperature and increase the risk of birth defects  · Avoid toxoplasmosis  This is an infection caused by eating raw meat or being around infected cat feces  It can cause birth defects, miscarriages, and other problems  Wash your hands after you touch raw meat  Make sure any meat is well-cooked before you eat it  Avoid raw eggs and unpasteurized milk   Use gloves or ask someone else to clean your cat's litter box while you are pregnant  Changes that are happening with your baby:  By 30 weeks, your baby may weigh more than 3 pounds  Your baby may be about 11 inches long from the top of the head to the rump (baby's bottom)  Your baby's eyes open and close now  Your baby's kicks and movements are more forceful at this time  What you need to know about prenatal care: Your healthcare provider will check your blood pressure and weight  You may also need the following:  · Blood tests  may be done to check for anemia or blood type  · A urine test  may also be done to check for sugar and protein  These can be signs of gestational diabetes or infection  Protein in your urine may also be a sign of preeclampsia  Preeclampsia is a condition that can develop during week 20 or later of your pregnancy  It causes high blood pressure, and it can cause problems with your kidneys and other organs  · A Tdap vaccine and flu vaccine  may be recommended by your healthcare provider  · A gestational diabetes screen  will be done using an oral glucose tolerance test (OGTT)  An OGTT starts with a blood sugar level check after you have not eaten for 8 hours  You are then given a glucose drink  Your blood sugar level is checked after 1 hour, 2 hours, and sometimes 3 hours  Healthcare providers look at how much your blood sugar level increases from the first check  · Fundal height  is a measurement of your uterus to check your baby's growth  This number is usually the same as the number of weeks that you have been pregnant  Your healthcare provider may also check your baby's position  · Your baby's heart rate  will be checked  © 2017 [facility] Information is for End User's use only and may not be sold, redistributed or otherwise used for commercial purposes   All illustrations and images included in CareNotes® are the copyrighted property of [employer]  or [facility] [employer]  The above information is an  only  It is not intended as medical advice for individual conditions or treatments  Talk to your doctor, nurse or pharmacist before following any medical regimen to see if it is safe and effective for you

## 2020-01-21 NOTE — PROGRESS NOTES
Triage Note - OB  Yakelin Bay 42 y o  female MRN: [id number]  Unit/Bed#:  TRIAGE 4-01 Encounter: [id number]    OB TRIAGE NOTE  Yakelin Bay  [id number]  1/21/2020  3:20 AM  LD TRIAGE 4/LD TRIAGE 4-*    ASSESS:  42 y o  [id number] 28w0d with elevated blood pressure    PLAN  #1  Chronic HTN   · Blood pressures here have been in the 826'H systolic: 594/36, 835/93, 120/81  · Denies associated symptoms concerning for pre-eclampsia with severe features, including: headache, vision changes, abdominal pain  · 24 hour urine collection done yesterday was wnl  · CBC and CMP unremarkable  · Stable for discharge    #2  Discharge instructions  · Patient instructed to call if experiencing worsening contractions, vaginal bleeding, loss of fluid or decreased fetal movement  · Will follow up with OBGYN on 2/6/2020  D/w Dr Kevin Payton  ______________    SUBJECTIVE    BEATRICE: Estimated Date of Delivery: 4/14/20    HPI Chronology:  42 y o  [id number] 28w0d presents with complaint of elevated blood pressures at home  This started 2 days ago  She had 2 episodes of elevated blood pressures in the 898R systolic, but this resolved with rest  Then earlier this evening she had another episode at work, which was associated with mild intermittent pelvic cramping  She does admit to having poor oral intake recently  She called her OB/GYN given her concern for elevated blood pressures and wanted to make sure nothing else was wrong  She was sent here to be evaluated  No associated fevers, chills, headaches, vision changes, abdominal pain, nausea, or vomiting  No recent illness  She has a history of hypertension, which she was treated with labetalol during this pregnancy  However a few months ago, this was discontinued since she had blood pressures in the 843L systolic  She is supposed to see her OB/GYN today       Contractions: none  Leakage: none  Bleeding: none  Fetal Movement: yes  Pelvic pain: mild intermittent groin pain    Vitals:   BP 122/91   Pulse 86   Temp 98 6 °F (37 °C) (Temporal)   Resp 18   Ht 5' 3" (1 6 m)   Wt 71 2 kg (157 lb)   LMP 07/09/2019   SpO2 100%   BMI 27 81 kg/m²   Body mass index is 27 81 kg/m²  Review of Systems   Constitutional: Negative for fatigue and fever  Denies headaches   HENT: Negative for rhinorrhea and sore throat  Eyes: Negative for photophobia, pain and visual disturbance  Respiratory: Negative for cough and shortness of breath  Cardiovascular: Negative for chest pain  Gastrointestinal: Negative for abdominal pain, diarrhea, nausea and vomiting  Endocrine: Negative for polyuria  Genitourinary: Positive for pelvic pain  Negative for dysuria, flank pain, hematuria, vaginal bleeding, vaginal discharge and vaginal pain  Skin: Negative for rash  Neurological: Negative for weakness  Physical Exam   Constitutional: She appears well-developed and well-nourished  No distress  HENT:   Head: Normocephalic and atraumatic  Right Ear: External ear normal    Left Ear: External ear normal    Eyes: Conjunctivae are normal    Abdominal: Soft  There is no tenderness  Neurological: She is alert  Skin: Skin is warm  She is not diaphoretic  Psychiatric: She has a normal mood and affect  Her behavior is normal    Nursing note and vitals reviewed        FHT:  Baseline Rate: 145 bpm  Variability: Moderate 6-25 bpm  Accelerations: 15 x 15 or greater  Decelerations: None  FHR Category: Category I  TOCO:   Contraction Frequency (minutes): 0  Contraction Duration (seconds): 0  Contraction Quality: Not applicable    Labs:   Recent Results (from the past 24 hour(s))   Protein, urine, 24 hour    Collection Time: 01/20/20 11:56 AM   Result Value Ref Range    24H Urine Volume 1,425 mL    Protein, 24H Urine 128 25 40 - 150 mg/24 hrs   CBC and differential    Collection Time: 01/21/20  2:39 AM   Result Value Ref Range    WBC 6 94 4 31 - 10 16 Thousand/uL    RBC 3 57 (L) 3 81 - 5 12 Million/uL Hemoglobin 10 8 (L) 11 5 - 15 4 g/dL    Hematocrit 33 7 (L) 34 8 - 46 1 %    MCV 94 82 - 98 fL    MCH 30 3 26 8 - 34 3 pg    MCHC 32 0 31 4 - 37 4 g/dL    RDW 20 7 (H) 11 6 - 15 1 %    MPV 9 2 8 9 - 12 7 fL    Platelets 026 082 - 333 Thousands/uL    nRBC 0 /100 WBCs    Neutrophils Relative 65 43 - 75 %    Immat GRANS % 0 0 - 2 %    Lymphocytes Relative 25 14 - 44 %    Monocytes Relative 9 4 - 12 %    Eosinophils Relative 1 0 - 6 %    Basophils Relative 0 0 - 1 %    Neutrophils Absolute 4 47 1 85 - 7 62 Thousands/µL    Immature Grans Absolute 0 03 0 00 - 0 20 Thousand/uL    Lymphocytes Absolute 1 70 0 60 - 4 47 Thousands/µL    Monocytes Absolute 0 65 0 17 - 1 22 Thousand/µL    Eosinophils Absolute 0 07 0 00 - 0 61 Thousand/µL    Basophils Absolute 0 02 0 00 - 0 10 Thousands/µL   Comprehensive metabolic panel    Collection Time: 01/21/20  2:39 AM   Result Value Ref Range    Sodium 137 136 - 145 mmol/L    Potassium 3 5 3 5 - 5 3 mmol/L    Chloride 104 100 - 108 mmol/L    CO2 22 21 - 32 mmol/L    ANION GAP 11 4 - 13 mmol/L    BUN 16 5 - 25 mg/dL    Creatinine 0 71 0 60 - 1 30 mg/dL    Glucose 93 65 - 140 mg/dL    Calcium 8 8 8 3 - 10 1 mg/dL    AST 8 5 - 45 U/L    ALT 13 12 - 78 U/L    Alkaline Phosphatase 71 46 - 116 U/L    Total Protein 6 1 (L) 6 4 - 8 2 g/dL    Albumin 2 6 (L) 3 5 - 5 0 g/dL    Total Bilirubin 0 14 (L) 0 20 - 1 00 mg/dL    eGFR 109 ml/min/1 73sq m       Lab, Imaging and other studies: I have personally reviewed pertinent reports          Luis Velarde DO  1/21/2020  3:20 AM

## 2020-01-24 ENCOUNTER — PROCEDURE VISIT (OUTPATIENT)
Dept: FAMILY MEDICINE CLINIC | Facility: CLINIC | Age: 40
End: 2020-01-24
Payer: COMMERCIAL

## 2020-01-24 ENCOUNTER — HOSPITAL ENCOUNTER (OUTPATIENT)
Dept: INFUSION CENTER | Facility: CLINIC | Age: 40
End: 2020-01-24

## 2020-01-24 VITALS
OXYGEN SATURATION: 99 % | SYSTOLIC BLOOD PRESSURE: 102 MMHG | HEART RATE: 77 BPM | WEIGHT: 156.7 LBS | HEIGHT: 63 IN | BODY MASS INDEX: 27.77 KG/M2 | RESPIRATION RATE: 16 BRPM | DIASTOLIC BLOOD PRESSURE: 80 MMHG

## 2020-01-24 DIAGNOSIS — M54.2 CERVICALGIA: Primary | ICD-10-CM

## 2020-01-24 PROCEDURE — 98925 OSTEOPATH MANJ 1-2 REGIONS: CPT | Performed by: FAMILY MEDICINE

## 2020-01-29 DIAGNOSIS — M54.2 CERVICALGIA: ICD-10-CM

## 2020-01-29 DIAGNOSIS — E55.9 VITAMIN D DEFICIENCY: ICD-10-CM

## 2020-01-30 RX ORDER — ERGOCALCIFEROL 1.25 MG/1
50000 CAPSULE ORAL WEEKLY
Qty: 8 CAPSULE | Refills: 0 | Status: SHIPPED | OUTPATIENT
Start: 2020-01-30 | End: 2020-04-20

## 2020-01-30 RX ORDER — OXYCODONE HYDROCHLORIDE AND ACETAMINOPHEN 5; 325 MG/1; MG/1
1 TABLET ORAL 2 TIMES DAILY
Qty: 60 TABLET | Refills: 0 | Status: SHIPPED | OUTPATIENT
Start: 2020-01-30 | End: 2020-03-23

## 2020-01-31 ENCOUNTER — HOSPITAL ENCOUNTER (OUTPATIENT)
Dept: INFUSION CENTER | Facility: CLINIC | Age: 40
Discharge: HOME | End: 2020-01-31
Payer: COMMERCIAL

## 2020-01-31 VITALS
TEMPERATURE: 98.2 F | SYSTOLIC BLOOD PRESSURE: 115 MMHG | HEART RATE: 73 BPM | DIASTOLIC BLOOD PRESSURE: 75 MMHG | RESPIRATION RATE: 16 BRPM

## 2020-01-31 DIAGNOSIS — O99.012 ANEMIA DURING PREGNANCY IN SECOND TRIMESTER: Primary | ICD-10-CM

## 2020-01-31 PROCEDURE — 96365 THER/PROPH/DIAG IV INF INIT: CPT

## 2020-01-31 RX ORDER — SODIUM CHLORIDE 9 MG/ML
20 INJECTION, SOLUTION INTRAVENOUS ONCE
Status: CANCELLED | OUTPATIENT
Start: 2020-02-06

## 2020-01-31 RX ORDER — SODIUM CHLORIDE 9 MG/ML
20 INJECTION, SOLUTION INTRAVENOUS ONCE
Status: COMPLETED | OUTPATIENT
Start: 2020-01-31 | End: 2020-01-31

## 2020-01-31 RX ADMIN — SODIUM CHLORIDE 20 ML/HR: 0.9 INJECTION, SOLUTION INTRAVENOUS at 11:34

## 2020-01-31 RX ADMIN — IRON SUCROSE 200 MG: 20 INJECTION, SOLUTION INTRAVENOUS at 11:35
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
## 2020-01-31 NOTE — PROGRESS NOTES
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 there is no new issues with there is no issues with her chronic back her chronic and neck back and neck pain nothing new has changed pain thing new has changed      The following portions of the patient's history were reviewed and updated as appropriate: current medications, past family history, past medical history, past social history, past surgical history and problem list     Review of Systems   Musculoskeletal:        Chronic chronic back and neck pain          Objective:      /82 (BP Location: Left arm, Patient Position: Sitting, Cuff Size: Adult)   Pulse 81   Temp 99 °F (37 2 °C) (Tympanic)   Resp 18   Wt 59 kg (130 lb)   LMP 04/19/2019   SpO2 99%   BMI 23 03 kg/m²          Physical Exam        Paravertebral her vertebral cervical muscle cervical muscle spasm spasm this this was wear to help with was worked muscle energy up muscle energy techniques and some nuchal release      T3 is rotated T3 is rotated right right and side bent and side bent left left it that was corrected that was corrected with each with each meal eating patient tolerated procedure well really take the patient tolerated procedure well

## 2020-01-31 NOTE — PROGRESS NOTES
Pt here for weekly venofer offers no complaints, resting comfortably  Vitals stable  Treatment tolerated without complication  Scheduled new appointments upon discharge   Declines AVS

## 2020-01-31 NOTE — PROGRESS NOTES
Assessment/Plan:    No problem-specific Assessment & Plan notes found for this encounter  Diagnoses and all orders for this visit:    Cervicalgia          Subjective:      Patient ID: Ml Bledsoe is a 42 y o  female      Patient is here for cervicalgia she has chronic intermittent neck pain from cervical disc disease and she comes in for OMT treatments we will longer do OMT on her thorax thoracic spine in her lumbar spine because of her advancing pregnancy    The following portions of the patient's history were reviewed and updated as appropriate: current medications, past family history, past medical history, past social history, past surgical history and problem list     Review of Systems   Musculoskeletal:        Chronic intermittent neck pain no radicular symptoms         Objective:      /80 (BP Location: Left arm, Patient Position: Standing, Cuff Size: Large)   Pulse 77   Resp 16   Ht 5' 3" (1 6 m)   Wt 71 1 kg (156 lb 11 2 oz)   LMP 07/09/2019   SpO2 99%   BMI 27 76 kg/m²     She has very ropey paravertebral cervical muscles tone she has decreased range of motion in flexion extension and side bending the techniques performed where muscle energy techniques and massage with nuchal release technique she tolerated the procedure well and she had better range of motion

## 2020-02-01 NOTE — PROGRESS NOTES
Assessment/Plan:    No problem-specific Assessment & Plan notes found for this encounter  Diagnoses and all orders for this visit:    Acute cystitis with hematuria    Dysuria  -     POCT urine dip auto non-scope    Other orders  -     Discontinue: oxyCODONE-acetaminophen (PERCOCET) 5-325 mg per tablet; Take 1 tablet by mouth every 4 (four) hours as needed for moderate pain          Subjective:  Patient is here for dysuria she thinks she may have urinary tract infection and she is also here for her refill of her oxycodone for her chronic disc disease     Patient ID: Carolee Officer is a 42 y o  female  Here for her OMT   She has cut down her tramadol and oxy to twice a day  She is sore this week in thoracic spine  No lifting recently        The following portions of the patient's history were reviewed and updated as appropriate: allergies, current medications, past family history, past medical history, past social history, past surgical history and problem list     Review of Systems   Constitutional: Negative  Eyes: Negative  Respiratory: Negative  Cardiovascular: Negative  Gastrointestinal: Negative for abdominal pain         Exam   No CVA tenderness   Mild suprapubic tenderness  Objective:      /68   Pulse 102   Temp 98 9 °F (37 2 °C)   Resp 18   Wt 60 3 kg (133 lb)   LMP 07/09/2019   SpO2 100%   BMI 23 56 kg/m²          Physical Exam

## 2020-02-02 NOTE — PROGRESS NOTES
Assessment/Plan:    No problem-specific Assessment & Plan notes found for this encounter  Diagnoses and all orders for this visit:    Cervicalgia          Subjective:      Patient ID: Pamella Gunderson is a 42 y o  female  Patient is here for her routine OMT visit we are only doing her neck as her pregnancy has advanced and we are not going to be doing anything on her back due to the advancing pregnancy      The following portions of the patient's history were reviewed and updated as appropriate: current medications, past family history, past medical history, past social history, past surgical history and problem list     Review of Systems   Eyes: Negative  Respiratory: Negative  Cardiovascular: Negative  Musculoskeletal:        No radicular symptoms         Objective:      /78 (BP Location: Left arm, Patient Position: Sitting)   Pulse 78   Temp 98 2 °F (36 8 °C) (Tympanic)   Resp 18   Wt 66 2 kg (146 lb)   LMP 07/09/2019   SpO2 98%   BMI 25 86 kg/m²          Physical Exam  Assessment/Plan:    No problem-specific Assessment & Plan notes found for this encounter  Diagnoses and all orders for this visit:    Cervicalgia          Subjective:      Patient ID: Pamella Gunderson is a 42 y o  female      Here for OMT on her neck as now that she is advancing in her gestational age we cannot due her back do to pressure on the uterus      The following portions of the patient's history were reviewed and updated as appropriate: current medications, past family history, past medical history, past social history, past surgical history and problem list     Review of Systems   Musculoskeletal:        Chronic neck spasm and tightness known disc per pmhx           Objective:      /78 (BP Location: Left arm, Patient Position: Sitting)   Pulse 78   Temp 98 2 °F (36 8 °C) (Tympanic)   Resp 18   Wt 66 2 kg (146 lb)   LMP 07/09/2019   SpO2 98%   BMI 25 86 kg/m²          Physical Exam Muscle spasms along cervicalspine  Muscle energy and nuchal release with improved ROM  OMT cervical  region

## 2020-02-03 NOTE — PROGRESS NOTES
Assessment/Plan:    No problem-specific Assessment & Plan notes found for this encounter  Diagnoses and all orders for this visit:    Cervicalgia          Subjective:      Patient ID: Carolee Officer is a 42 y o  female  Here for her OMT on her neck  No new complaint other than neck is tight no radicular symptoms        The following portions of the patient's history were reviewed and updated as appropriate: current medications, past family history, past medical history, past social history, past surgical history and problem list     Review of Systems   Constitutional: Negative  Eyes: Negative  Respiratory: Negative  Cardiovascular: Negative  Gastrointestinal: Negative            Objective:      /70   Pulse 84   Temp 98 2 °F (36 8 °C)   Wt 70 kg (154 lb 6 4 oz)   LMP 07/09/2019   SpO2 99%   BMI 27 35 kg/m²          Physical Exam   Musculoskeletal:   Muscle pain and tenderness cervical spine    NO HVLA  Muscle energy and nuchal release done     1 one region of OMT done

## 2020-02-05 DIAGNOSIS — M54.2 CERVICALGIA: ICD-10-CM

## 2020-02-06 ENCOUNTER — ROUTINE PRENATAL (OUTPATIENT)
Dept: OBGYN CLINIC | Facility: CLINIC | Age: 40
End: 2020-02-06

## 2020-02-06 ENCOUNTER — HOSPITAL ENCOUNTER (OUTPATIENT)
Dept: INFUSION CENTER | Facility: CLINIC | Age: 40
End: 2020-02-06

## 2020-02-06 VITALS
HEIGHT: 63 IN | SYSTOLIC BLOOD PRESSURE: 120 MMHG | WEIGHT: 162.8 LBS | DIASTOLIC BLOOD PRESSURE: 84 MMHG | BODY MASS INDEX: 28.84 KG/M2

## 2020-02-06 DIAGNOSIS — M54.2 CERVICALGIA: ICD-10-CM

## 2020-02-06 DIAGNOSIS — Z3A.30 30 WEEKS GESTATION OF PREGNANCY: ICD-10-CM

## 2020-02-06 DIAGNOSIS — Z87.59 H/O MIGRAINE DURING PREGNANCY: Primary | ICD-10-CM

## 2020-02-06 DIAGNOSIS — Z86.69 H/O MIGRAINE DURING PREGNANCY: Primary | ICD-10-CM

## 2020-02-06 DIAGNOSIS — I10 ESSENTIAL HYPERTENSION: ICD-10-CM

## 2020-02-06 DIAGNOSIS — O99.012 ANEMIA DURING PREGNANCY IN SECOND TRIMESTER: ICD-10-CM

## 2020-02-06 PROCEDURE — PNV: Performed by: OBSTETRICS & GYNECOLOGY

## 2020-02-06 RX ORDER — BUTALBITAL/ASPIRIN/CAFFEINE 50-325-40
1 CAPSULE ORAL EVERY 6 HOURS PRN
Qty: 30 CAPSULE | Refills: 0 | Status: SHIPPED | OUTPATIENT
Start: 2020-02-06 | End: 2020-03-02 | Stop reason: SDUPTHER

## 2020-02-06 RX ORDER — ASPIRIN 81 MG/1
162 TABLET ORAL DAILY
Qty: 60 TABLET | Refills: 2 | Status: SHIPPED | OUTPATIENT
Start: 2020-02-06 | End: 2020-05-04 | Stop reason: ALTCHOICE

## 2020-02-06 NOTE — PROGRESS NOTES
Visit:  Good FM - worried unable to tolerate fluid with glucola test and given slip for 1 hour PP after diet - desires refill of fioricet for increase in headaches - resumed labetolol for BP about 2 weeks ago [facility]) - given checking in information and reviewed TDaP - will review with MFM need for APFS - wishes to start with physical therapy for lower back pain

## 2020-02-07 DIAGNOSIS — M54.2 CERVICALGIA: ICD-10-CM

## 2020-02-07 RX ORDER — TRAMADOL HYDROCHLORIDE 50 MG/1
TABLET ORAL
Qty: 90 TABLET | Refills: 0 | OUTPATIENT
Start: 2020-02-07

## 2020-02-07 RX ORDER — TRAMADOL HYDROCHLORIDE 50 MG/1
50 TABLET ORAL EVERY 8 HOURS PRN
Qty: 90 TABLET | Refills: 0 | OUTPATIENT
Start: 2020-02-07

## 2020-02-07 RX ORDER — TRAMADOL HYDROCHLORIDE 50 MG/1
TABLET ORAL
Qty: 90 TABLET | Refills: 0 | Status: SHIPPED | OUTPATIENT
Start: 2020-02-07 | End: 2020-03-23

## 2020-02-08 ENCOUNTER — HOSPITAL ENCOUNTER (OUTPATIENT)
Dept: INFUSION CENTER | Facility: CLINIC | Age: 40
Discharge: HOME | End: 2020-02-08
Payer: COMMERCIAL

## 2020-02-08 VITALS
DIASTOLIC BLOOD PRESSURE: 66 MMHG | OXYGEN SATURATION: 99 % | HEART RATE: 88 BPM | SYSTOLIC BLOOD PRESSURE: 118 MMHG | TEMPERATURE: 98.2 F | RESPIRATION RATE: 16 BRPM

## 2020-02-08 DIAGNOSIS — O99.012 ANEMIA DURING PREGNANCY IN SECOND TRIMESTER: Primary | ICD-10-CM

## 2020-02-08 PROCEDURE — 96365 THER/PROPH/DIAG IV INF INIT: CPT

## 2020-02-08 RX ORDER — SODIUM CHLORIDE 9 MG/ML
20 INJECTION, SOLUTION INTRAVENOUS ONCE
Status: COMPLETED | OUTPATIENT
Start: 2020-02-08 | End: 2020-02-08

## 2020-02-08 RX ORDER — SODIUM CHLORIDE 9 MG/ML
20 INJECTION, SOLUTION INTRAVENOUS ONCE
Status: CANCELLED | OUTPATIENT
Start: 2020-02-15

## 2020-02-08 RX ADMIN — IRON SUCROSE 200 MG: 20 INJECTION, SOLUTION INTRAVENOUS at 13:21

## 2020-02-08 RX ADMIN — SODIUM CHLORIDE 20 ML/HR: 9 INJECTION, SOLUTION INTRAVENOUS at 13:21

## 2020-02-19 ENCOUNTER — PROCEDURE VISIT (OUTPATIENT)
Dept: FAMILY MEDICINE CLINIC | Facility: CLINIC | Age: 40
End: 2020-02-19
Payer: COMMERCIAL

## 2020-02-19 VITALS
HEIGHT: 63 IN | HEART RATE: 84 BPM | OXYGEN SATURATION: 98 % | BODY MASS INDEX: 28.53 KG/M2 | SYSTOLIC BLOOD PRESSURE: 98 MMHG | DIASTOLIC BLOOD PRESSURE: 62 MMHG | RESPIRATION RATE: 18 BRPM | WEIGHT: 161 LBS

## 2020-02-19 DIAGNOSIS — M54.2 CERVICALGIA: Primary | ICD-10-CM

## 2020-02-19 PROCEDURE — 98925 OSTEOPATH MANJ 1-2 REGIONS: CPT | Performed by: FAMILY MEDICINE

## 2020-02-19 RX ORDER — LABETALOL 100 MG/1
100 TABLET, FILM COATED ORAL DAILY
Status: ON HOLD | COMMUNITY
End: 2020-04-03 | Stop reason: SDUPTHER

## 2020-02-19 RX ORDER — FERROUS SULFATE 325(65) MG
325 TABLET ORAL
COMMUNITY
End: 2020-04-16

## 2020-02-24 ENCOUNTER — ROUTINE PRENATAL (OUTPATIENT)
Dept: OBGYN CLINIC | Facility: CLINIC | Age: 40
End: 2020-02-24

## 2020-02-24 ENCOUNTER — TELEPHONE (OUTPATIENT)
Dept: OBGYN CLINIC | Facility: CLINIC | Age: 40
End: 2020-02-24

## 2020-02-24 VITALS
HEIGHT: 63 IN | BODY MASS INDEX: 29.06 KG/M2 | WEIGHT: 164 LBS | HEART RATE: 82 BPM | SYSTOLIC BLOOD PRESSURE: 118 MMHG | DIASTOLIC BLOOD PRESSURE: 80 MMHG

## 2020-02-24 DIAGNOSIS — Z3A.32 32 WEEKS GESTATION OF PREGNANCY: ICD-10-CM

## 2020-02-24 DIAGNOSIS — O99.013 ANEMIA DURING PREGNANCY IN THIRD TRIMESTER: Primary | ICD-10-CM

## 2020-02-24 PROCEDURE — PNV: Performed by: NURSE PRACTITIONER

## 2020-02-24 NOTE — TELEPHONE ENCOUNTER
Patient called in extremely upset with care receiving in our office  States was on the NST for an hour if not longer and never saw the provider  Says she doesn't think anyone even remembered she was in that room  States every time she calls here we dont know anything about her and have to refer to her chart  States we canceled her apt last week so therefore has not seen a provider in 4 weeks  Reviewed with patient that we canceled her apt because she needed NST and already had apt scheduled at [facility] and was more important that she keep that appointment for her scan and we added the NST to that appointment for her  Patient states she does not feel as though she is receiving adequate care in our office  She is a "high risk" patient though didn't review what makes her high risk  States she thinks at this time she needs to find care elsewhere  States has only seen a doctor 2x for her high risk pregnancy (however looking at her chart she requests 730 and 800 apts in [place])  States she doesn't think she has been seen appropriately in her pregnancy  Reviewed with patient all appointments and were all seen as she should have been  States she was to be seen today and never saw a provider  Reviewed again, I am not sure what was going on at the time of her appointment but she was in a provider schedule so would have been seen when that provider was available  Patient said a nurse should have come up and checked her strip and let her leave  Pt aware that is not how our office works, needs to see a provider and our nurse is currently out on maternity leave  However even if nurse was in the office she could review strip, but would still have to see the provider as that is protocol  Patient states she needed to leave to  her daughter from  and now had to pay a late fee for picking up after 5pm and now has not seen a provider  Asked for recommendations for another office as she thinks she needs to transfer  Patient aware I understand her frustration, though I was not upstairs at the time of her visit so I do not know the circumstances as to why she was on NST 15 min longer than her scheduled time  Patient aware will review with provider and  and  will be in touch with her tomorrow

## 2020-02-24 NOTE — PATIENT INSTRUCTIONS
Kick Counts in Pregnancy   AMBULATORY CARE:   Kick counts  measure how much your baby is moving in your womb  A kick from your baby can be felt as a twist, turn, swish, roll, or jab  It is common to feel your baby kicking at 26 to 28 weeks of pregnancy  You may feel your baby kick as early as 20 weeks of pregnancy  Seek care immediately if:   · You feel your baby kick less as the day goes on      · You do not feel any kicks in a day  Contact your healthcare provider if:   · You feel a change in the number of kicks or movements of your baby  · You feel fewer than 10 kicks within 2 hours after counting twice  · You have questions or concerns about your baby's movements  Why measure kick counts:  Your baby's movement may provide information about your baby's health  He may move less, or not at all, if there are problems  He may move less if he does not have enough room to grow in your uterus (womb)  He may also move less if he is not getting enough oxygen or nutrition from the placenta  Tell your healthcare provider as soon as you feel a change in your baby's movements  Problems that are found earlier are easier to treat  When to measure kick counts:   · Measure kick counts at the same time every day  · Measure kick counts when your baby is awake and most active  Your baby may be most active in the evening  · Measure kick counts after a meal or snack  Your baby may be more active after you eat  Wait 2 hours after you drink liquids that contain caffeine  Caffeine can make your baby more active than usual     · You should not smoke while you are pregnant  Smoking increases the risk of health problems for you and for your baby during your pregnancy  If you do smoke, wait 2 hours to measure kick counts  Nicotine can make your baby more active than usual   How to measure kick counts:  Check that your baby is awake before you measure kick counts   You can wake up your baby by lightly pushing on your belly, walking, or drinking something cold  Your healthcare provider may tell you different ways to measure kick counts  He may tell you to do the following:  · Use a chart or clock to keep track of the time you start and finish counting  · Sit in a chair or lie on your left side  · Place your hands on the largest part of your belly  · Count until you reach 10 kicks  Write down how much time it takes to count 10 kicks  · It may take 30 minutes to 2 hours to count 10 kicks  It should not take more than 2 hours to count 10 kicks  · If you do not feel 10 kicks within 2 hours, wait 1 hour and count again  Your baby can sleep for up to 40 minutes at one time  Follow up with your healthcare provider as directed:  Write down your questions so you remember to ask them during your visits  © 2017 [address] Information is for End User's use only and may not be sold, redistributed or otherwise used for commercial purposes  All illustrations and images included in CareNotes® are the copyrighted property of [employer]  or [employer]  The above information is an  only  It is not intended as medical advice for individual conditions or treatments  Talk to your doctor, nurse or pharmacist before following any medical regimen to see if it is safe and effective for you  Pregnancy at 32 to 30 [address]:   What changes are happening in my body? You may notice new symptoms such as shortness of breath, heartburn, or swelling of your ankles and feet  You may also have trouble sleeping or contractions  How do I care for myself at this stage of my pregnancy? · Eat a variety of healthy foods  Healthy foods include fruits, vegetables, whole-grain breads, low-fat dairy foods, beans, lean meats, and fish  Drink liquids as directed  Ask how much liquid to drink each day and which liquids are best for you   Limit caffeine to less than 200 milligrams each day  Limit your intake of fish to 2 servings each week  Choose fish low in mercury such as canned light tuna, shrimp, salmon, cod, or tilapia  Do not  eat fish high in mercury such as swordfish, tilefish, mariaelena mackerel, and shark  · Manage heartburn  by eating 4 or 5 small meals each day instead of large meals  Avoid spicy food  · Manage swelling  by lying down and putting your feet up  · Take prenatal vitamins as directed  Your need for certain vitamins and minerals, such as folic acid, increases during pregnancy  Prenatal vitamins provide some of the extra vitamins and minerals you need  Prenatal vitamins may also help to decrease the risk of certain birth defects  · Talk to your healthcare provider about exercise  Moderate exercise can help you stay fit  Your healthcare provider will help you plan an exercise program that is safe for you during pregnancy  · Do not smoke  If you smoke, it is never too late to quit  Smoking increases your risk of a miscarriage and other health problems during your pregnancy  Smoking can cause your baby to be born too early or weigh less at birth  Ask your healthcare provider for information if you need help quitting  · Do not drink alcohol  Alcohol passes from your body to your baby through the placenta  It can affect your baby's brain development and cause fetal alcohol syndrome (FAS)  FAS is a group of conditions that causes mental, behavior, and growth problems  · Talk to your healthcare provider before you take any medicines  Many medicines may harm your baby if you take them when you are pregnant  Do not take any medicines, vitamins, herbs, or supplements without first talking to your healthcare provider  Never use illegal or street drugs (such as marijuana or cocaine) while you are pregnant  What are some safety tips during pregnancy? · Avoid hot tubs and saunas    Do not use a hot tub or sauna while you are pregnant, especially during your first trimester  Hot tubs and saunas may raise your baby's temperature and increase the risk of birth defects  · Avoid toxoplasmosis  This is an infection caused by eating raw meat or being around infected cat feces  It can cause birth defects, miscarriages, and other problems  Wash your hands after you touch raw meat  Make sure any meat is well-cooked before you eat it  Avoid raw eggs and unpasteurized milk  Use gloves or ask someone else to clean your cat's litter box while you are pregnant  What changes are happening with my baby? By 30 weeks, your baby may weigh more than 3 pounds  Your baby may be about 11 inches long from the top of the head to the rump (baby's bottom)  Your baby's eyes open and close now  Your baby's kicks and movements are more forceful at this time  What do I need to know about prenatal care? Your healthcare provider will check your blood pressure and weight  You may also need the following:  · Blood tests  may be done to check for anemia or blood type  · A urine test  may also be done to check for sugar and protein  These can be signs of gestational diabetes or infection  Protein in your urine may also be a sign of preeclampsia  Preeclampsia is a condition that can develop during week 20 or later of your pregnancy  It causes high blood pressure, and it can cause problems with your kidneys and other organs  · A Tdap vaccine and flu vaccine  may be recommended by your healthcare provider  · A gestational diabetes screen  will be done using an oral glucose tolerance test (OGTT)  An OGTT starts with a blood sugar level check after you have not eaten for 8 hours  You are then given a glucose drink  Your blood sugar level is checked after 1 hour, 2 hours, and sometimes 3 hours  Healthcare providers look at how much your blood sugar level increases from the first check  · Fundal height  is a measurement of your uterus to check your baby's growth   This number is usually the same as the number of weeks that you have been pregnant  Your healthcare provider may also check your baby's position  · Your baby's heart rate  will be checked  When should I seek immediate care? · You develop a severe headache that does not go away  · You have new or increased vision changes, such as blurred or spotted vision  · You have new or increased swelling in your face or hands  · You have vaginal spotting or bleeding  · Your water broke or you feel warm water gushing or trickling from your vagina  When should I contact my healthcare provider? · You have more than 5 contractions in 1 hour  · You notice any changes in your baby's movements  · You have abdominal cramps, pressure, or tightening  · You have a change in vaginal discharge  · You have chills or a fever  · You have vaginal itching, burning, or pain  · You have yellow, green, white, or foul-smelling vaginal discharge  · You have pain or burning when you urinate, less urine than usual, or pink or bloody urine  · You have questions or concerns about your condition or care  CARE AGREEMENT:   You have the right to help plan your care  Learn about your health condition and how it may be treated  Discuss treatment options with your caregivers to decide what care you want to receive  You always have the right to refuse treatment  The above information is an  only  It is not intended as medical advice for individual conditions or treatments  Talk to your doctor, nurse or pharmacist before following any medical regimen to see if it is safe and effective for you  © 2017 [address]  Information is for End User's use only and may not be sold, redistributed or otherwise used for commercial purposes  All illustrations and images included in CareNotes® are the copyrighted property of A D A M , Inc  or Venu Mejia

## 2020-02-25 NOTE — TELEPHONE ENCOUNTER
Spoke with patient  We discussed the provider running behind and her wait in the NST room  We discussed the fact that sometimes the providers are running behind due to concerns of the previous patient  She felt like she was dismissed and said if she knew she could have made arrangements for someone else to  her daughter  I assured her I would review with MA's to keep patients informed  She has appointment with Dr Shoaib Vargas on March 6 at 8:15  We did make her an NST at 7:45  She is not sure if she will need it and after she has her MFM appointment will cancel if not needed

## 2020-02-25 NOTE — TELEPHONE ENCOUNTER
Left message on machine that patient left without seeing providers, since provider was running 15 minutes late  Machine cut me off

## 2020-02-28 ENCOUNTER — TELEPHONE (OUTPATIENT)
Dept: OBGYN CLINIC | Facility: CLINIC | Age: 40
End: 2020-02-28

## 2020-02-28 ENCOUNTER — ROUTINE PRENATAL (OUTPATIENT)
Dept: OBGYN CLINIC | Facility: CLINIC | Age: 40
End: 2020-02-28
Payer: COMMERCIAL

## 2020-02-28 VITALS — BODY MASS INDEX: 29.05 KG/M2 | WEIGHT: 164 LBS | SYSTOLIC BLOOD PRESSURE: 120 MMHG | DIASTOLIC BLOOD PRESSURE: 80 MMHG

## 2020-02-28 DIAGNOSIS — O10.919 CHRONIC HYPERTENSION AFFECTING PREGNANCY: ICD-10-CM

## 2020-02-28 DIAGNOSIS — Z87.59 HISTORY OF PLACENTA ABRUPTION: ICD-10-CM

## 2020-02-28 DIAGNOSIS — Z34.83 ENCOUNTER FOR SUPERVISION OF OTHER NORMAL PREGNANCY IN THIRD TRIMESTER: ICD-10-CM

## 2020-02-28 DIAGNOSIS — O26.899 PREGNANCY HEADACHE, ANTEPARTUM: ICD-10-CM

## 2020-02-28 DIAGNOSIS — O09.523 MULTIGRAVIDA OF ADVANCED MATERNAL AGE IN THIRD TRIMESTER: ICD-10-CM

## 2020-02-28 DIAGNOSIS — R51.9 PREGNANCY HEADACHE, ANTEPARTUM: ICD-10-CM

## 2020-02-28 DIAGNOSIS — O09.90 HIGH RISK PREGNANCY, ANTEPARTUM: ICD-10-CM

## 2020-02-28 DIAGNOSIS — O99.013 ANEMIA DURING PREGNANCY IN THIRD TRIMESTER: ICD-10-CM

## 2020-02-28 DIAGNOSIS — Z3A.33 33 WEEKS GESTATION OF PREGNANCY: ICD-10-CM

## 2020-02-28 DIAGNOSIS — M41.9 SCOLIOSIS OF THORACIC SPINE, UNSPECIFIED SCOLIOSIS TYPE: Primary | ICD-10-CM

## 2020-02-28 DIAGNOSIS — Z98.891 HISTORY OF CESAREAN DELIVERY: ICD-10-CM

## 2020-02-28 PROBLEM — I49.1 PREMATURE ATRIAL COMPLEXES: Status: ACTIVE | Noted: 2019-12-19

## 2020-02-28 PROBLEM — Z82.79 FAMILY HISTORY OF CONGENITAL HEART DEFECT: Status: ACTIVE | Noted: 2019-12-17

## 2020-02-28 PROCEDURE — PNV: Performed by: OBSTETRICS & GYNECOLOGY

## 2020-02-28 PROCEDURE — 59025 FETAL NON-STRESS TEST: CPT | Performed by: OBSTETRICS & GYNECOLOGY

## 2020-02-28 RX ORDER — ACETAMINOPHEN 325 MG/1
325 TABLET ORAL EVERY 6 HOURS PRN
COMMUNITY
End: 2020-03-20

## 2020-02-28 RX ORDER — LANCETS
EACH MISCELLANEOUS
Qty: 100 EACH | Refills: 0 | Status: SHIPPED | OUTPATIENT
Start: 2020-02-28 | End: 2020-04-10 | Stop reason: ALTCHOICE

## 2020-02-28 NOTE — PATIENT INSTRUCTIONS
Choosing Between Vaginal Birth After  or Repeat    WHAT YOU NEED TO KNOW:   What may increase my chance of having a vaginal birth after  ()? · Your  incision is in the lower part of your abdomen  · You have not had other surgeries on your uterus  · You have had a normal pregnancy  · You are younger than 40 years  · You have had more than 1 vaginal delivery  · Your labor begins on its own without the help of medicines  What may decrease my chance of having a ? · You have had more than 1   · You have a high vertical (up and down) incision in your abdomen    · Your uterus has ruptured during a previous delivery  · Your baby is in the breech position (bottom facing down)  · You have pregnancy problems or a medical condition that makes a vaginal delivery dangerous  · Your baby is 9 pounds or larger  · You are past your due date  What are some benefits of a ? · You will have a faster recovery  You can often go home within a couple of days after delivery  You may have less pain, and it may go away sooner  Your body may recover more quickly  You may be able to return to your daily activities sooner  · There are fewer health risks  Infection and injury to your organs are less likely  There is a lower risk of heavy bleeding  You may be able to walk around sooner  This may decrease the risk for blood clots  What are some risks of a ? The  scar on your uterus may rupture during delivery  This can cause serious health problems for you and your baby  The delivery may not go as planned and you may need another   What are some benefits of a ? A  is a safer option if you have a vertical incision in your upper abdomen from your previous   It may also be a safer option if you have certain pregnancy problems or medical conditions   If you want your tubes tied to prevent future pregnancies, it may be done at the same time as the   What are some risks of a ? You may bleed more than expected or get an infection  Your bladder or intestines may be injured  This can cause bleeding and lead to problems with your unborn baby  You may get a blood clot in your leg  This may become life-threatening  Multiple C-sections increase your risk of problems in future pregnancies  Your risk for placenta previa is increased if you have had more than 1   Placenta previa is a condition that causes your placenta to cover your cervix  CARE AGREEMENT:   You have the right to help plan your care  Learn about your health condition and how it may be treated  Discuss treatment options with your caregivers to decide what care you want to receive  You always have the right to refuse treatment  The above information is an  only  It is not intended as medical advice for individual conditions or treatments  Talk to your doctor, nurse or pharmacist before following any medical regimen to see if it is safe and effective for you  ©  [address]  Information is for End User's use only and may not be sold, redistributed or otherwise used for commercial purposes  All illustrations and images included in CareNotes® are the copyrighted property of A D A M , Inc  or Venu Mejia

## 2020-02-28 NOTE — PROGRESS NOTES
42 y o  [id number]  female at 34 3 wga EGA for PNV  BP : 120/80  TWlb    Patient is a new OB transfer here for her first prenatal visit  She was previously receiving care through another [facility] provider  She sees Dr Mendy Ibarra through [facility]  Today patient reports that she is doing well but is still having mild headaches each day  She has concerns regarding doing her glucose tolerance test as she has been unable to drink fluids quickly without vomiting  Her glucose tolerance test has not been performed yet  Reviewed her obstetrical history:   - prior  section in last pregnancy due to placental abruption  Emergent  section performed under general anesthesia  Patient reports scoliosis of the back and is unsure if that caused issues with the spinal or due to the emergent nature of the delivery she required general anesthesia for her   Referral provided for patient to see Dr Jim Ibarra for OB anesthesia to evaluate for issues with receiving spinal or epidural anesthesia  Discussed TOLAC for  vs repeat   Reviewed risks associated with TOLAC especially risk for uterine rupture  Discussed that 1 prior  section places her at risk for uterine rupture of approximately 0 7%  Discussed that induction methods would further increase her risk for uterine rupture during labor  Discussed anticipating low threshold for internal monitoring to measure strict of uterine contractions for induction was to be done  Also discussed repeat  section procedure details  Patient to continue to consider her options as far as TOLAC versus repeat  section  Provided educational information on deciding between repeat  and TOLAC  - Chronic HTN -- controlled on labetalol 100mg  Delivery indicated between 37-39 weeks  BP maintained 120s/80s  Discussed that as long as BP remains controlled can discuss induction vs repeat  section at 39 weeks   APFS with twice weekly NST and weekly REBECCA  Plan for NST in our office weekly and REBECCA[facility]NST at [facility]  - History of placental abruption  Cause for emergent delivery at 37 weeks during her previous pregnancy  Patient describes having a prolonged contraction during which her abdomen remained hard for very long time and then having vaginal hemorrhage occur  She reports that she was rushed to the operating room upon arrival at the hospital for an emergent  section  No trial of labor was attempted  Reviewed increased risk of placental abruption due to history  Discussed need for testing for gestational diabetes  Glucometer, test strips, and lancets ordered for blood glucose monitoring  Recommend checking fasting and 2 hour postprandials after breakfast, lunch, and dinner for a week and bringing in sugar log for review  Patient agreeable  Patient oriented to practice, different practice providers, different practice locations  Discussed anticipated delivery at [facility] [employer]  Follow up in 1 week

## 2020-02-28 NOTE — PROGRESS NOTES
BEATRICE: 4/14/20  The patient has swelling in her hands and feet  No bleeding  The patient has cramping every day which usually goes away after laying down  The patient has nausea, mild-severe headaches, and dizziness daily  The patient has vomiting when she drinks too much fluids  The patient transferred here from [address] [facility] in [place]  Trace protein, everything else was negative in urine dip

## 2020-02-28 NOTE — TELEPHONE ENCOUNTER
Patient called stating she was supposed to get a glucometer sent to the pharmacy  [facility] and she would have to follow up with them  Patient then stated she is transferring her care over there

## 2020-03-02 ENCOUNTER — ROUTINE PRENATAL (OUTPATIENT)
Dept: OBGYN CLINIC | Facility: CLINIC | Age: 40
End: 2020-03-02
Payer: COMMERCIAL

## 2020-03-02 VITALS — SYSTOLIC BLOOD PRESSURE: 116 MMHG | BODY MASS INDEX: 28.87 KG/M2 | WEIGHT: 163 LBS | DIASTOLIC BLOOD PRESSURE: 74 MMHG

## 2020-03-02 DIAGNOSIS — Z87.59 HISTORY OF PLACENTA ABRUPTION: ICD-10-CM

## 2020-03-02 DIAGNOSIS — Z86.69 H/O MIGRAINE DURING PREGNANCY: ICD-10-CM

## 2020-03-02 DIAGNOSIS — O10.919 CHRONIC HYPERTENSION AFFECTING PREGNANCY: ICD-10-CM

## 2020-03-02 DIAGNOSIS — Z98.891 HISTORY OF CESAREAN DELIVERY: ICD-10-CM

## 2020-03-02 DIAGNOSIS — Z87.59 H/O MIGRAINE DURING PREGNANCY: ICD-10-CM

## 2020-03-02 DIAGNOSIS — R35.0 URINE FREQUENCY: ICD-10-CM

## 2020-03-02 DIAGNOSIS — O09.529 ANTEPARTUM MULTIGRAVIDA OF ADVANCED MATERNAL AGE: ICD-10-CM

## 2020-03-02 DIAGNOSIS — R30.0 BURNING WITH URINATION: ICD-10-CM

## 2020-03-02 DIAGNOSIS — O09.90 HIGH RISK PREGNANCY, ANTEPARTUM: Primary | ICD-10-CM

## 2020-03-02 DIAGNOSIS — Z3A.33 33 WEEKS GESTATION OF PREGNANCY: ICD-10-CM

## 2020-03-02 LAB
SL AMB  POCT GLUCOSE, UA: NORMAL
SL AMB LEUKOCYTE ESTERASE,UA: NORMAL
SL AMB POCT BILIRUBIN,UA: NORMAL
SL AMB POCT BLOOD,UA: NORMAL
SL AMB POCT CLARITY,UA: NORMAL
SL AMB POCT COLOR,UA: YELLOW
SL AMB POCT KETONES,UA: NORMAL
SL AMB POCT NITRITE,UA: NORMAL
SL AMB POCT PH,UA: 6
SL AMB POCT SPECIFIC GRAVITY,UA: 1.03
SL AMB POCT URINE PROTEIN: NORMAL
SL AMB POCT UROBILINOGEN: NORMAL

## 2020-03-02 PROCEDURE — 59025 FETAL NON-STRESS TEST: CPT | Performed by: OBSTETRICS & GYNECOLOGY

## 2020-03-02 PROCEDURE — 81002 URINALYSIS NONAUTO W/O SCOPE: CPT | Performed by: OBSTETRICS & GYNECOLOGY

## 2020-03-02 PROCEDURE — PNV: Performed by: OBSTETRICS & GYNECOLOGY

## 2020-03-02 RX ORDER — BUTALBITAL/ASPIRIN/CAFFEINE 50-325-40
1 CAPSULE ORAL EVERY 6 HOURS PRN
Qty: 15 CAPSULE | Refills: 0 | Status: SHIPPED | OUTPATIENT
Start: 2020-03-02 | End: 2020-03-20 | Stop reason: SDUPTHER

## 2020-03-02 NOTE — PROGRESS NOTES
NST today   Perineal massage info due   C/o possible uti, c/o pressure, burning and frequency x1 wk, clean catch urine obtained   Urine dip neg/neg

## 2020-03-03 NOTE — PROGRESS NOTES
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

## 2020-03-04 ENCOUNTER — OFFICE VISIT (OUTPATIENT)
Dept: FAMILY MEDICINE CLINIC | Facility: CLINIC | Age: 40
End: 2020-03-04
Payer: COMMERCIAL

## 2020-03-04 ENCOUNTER — TELEPHONE (OUTPATIENT)
Dept: OBGYN CLINIC | Facility: CLINIC | Age: 40
End: 2020-03-04

## 2020-03-04 VITALS
BODY MASS INDEX: 28.95 KG/M2 | DIASTOLIC BLOOD PRESSURE: 72 MMHG | HEART RATE: 94 BPM | TEMPERATURE: 98.2 F | OXYGEN SATURATION: 97 % | HEIGHT: 63 IN | SYSTOLIC BLOOD PRESSURE: 118 MMHG | WEIGHT: 163.4 LBS

## 2020-03-04 DIAGNOSIS — M54.2 CERVICALGIA: Primary | ICD-10-CM

## 2020-03-04 PROCEDURE — 98925 OSTEOPATH MANJ 1-2 REGIONS: CPT | Performed by: FAMILY MEDICINE

## 2020-03-04 RX ORDER — BLOOD-GLUCOSE METER
EACH MISCELLANEOUS
COMMUNITY
Start: 2020-02-28 | End: 2020-04-16

## 2020-03-04 RX ORDER — SODIUM CHLORIDE 9 MG/ML
20 INJECTION, SOLUTION INTRAVENOUS ONCE
Status: CANCELLED | OUTPATIENT
Start: 2020-03-07

## 2020-03-04 NOTE — TELEPHONE ENCOUNTER
Lola from [facility] calling to have dates for [facility] changed in [place]  Patient missed last appt so all dates need to be changed to 3/7/20  Routing to provider to update

## 2020-03-09 ENCOUNTER — ROUTINE PRENATAL (OUTPATIENT)
Dept: OBGYN CLINIC | Facility: CLINIC | Age: 40
End: 2020-03-09
Payer: COMMERCIAL

## 2020-03-09 VITALS — DIASTOLIC BLOOD PRESSURE: 80 MMHG | BODY MASS INDEX: 29.3 KG/M2 | WEIGHT: 165.4 LBS | SYSTOLIC BLOOD PRESSURE: 122 MMHG

## 2020-03-09 DIAGNOSIS — O10.919 CHRONIC HYPERTENSION AFFECTING PREGNANCY: ICD-10-CM

## 2020-03-09 DIAGNOSIS — Z98.891 HISTORY OF CESAREAN DELIVERY: ICD-10-CM

## 2020-03-09 DIAGNOSIS — O99.013 ANEMIA DURING PREGNANCY IN THIRD TRIMESTER: ICD-10-CM

## 2020-03-09 DIAGNOSIS — Z87.59 HISTORY OF PLACENTA ABRUPTION: ICD-10-CM

## 2020-03-09 DIAGNOSIS — O26.899 PREGNANCY HEADACHE, ANTEPARTUM: ICD-10-CM

## 2020-03-09 DIAGNOSIS — O09.90 HIGH RISK PREGNANCY, ANTEPARTUM: Primary | ICD-10-CM

## 2020-03-09 DIAGNOSIS — R51.9 PREGNANCY HEADACHE, ANTEPARTUM: ICD-10-CM

## 2020-03-09 DIAGNOSIS — O09.529 ANTEPARTUM MULTIGRAVIDA OF ADVANCED MATERNAL AGE: ICD-10-CM

## 2020-03-09 PROBLEM — Z3A.34 34 WEEKS GESTATION OF PREGNANCY: Status: ACTIVE | Noted: 2020-01-21

## 2020-03-09 PROCEDURE — PNV: Performed by: OBSTETRICS & GYNECOLOGY

## 2020-03-09 PROCEDURE — 59025 FETAL NON-STRESS TEST: CPT | Performed by: OBSTETRICS & GYNECOLOGY

## 2020-03-09 NOTE — PROGRESS NOTES
47 y o  [id number]  female at 27 10 wga EGA for PNV  BP : 122/80  TWlb    Patient is presenting for a return OB visit  She states that she is doing well today and has minimal complaints  Chronic hypertension-controlled with labetalol 100 p o  daily  NST performed today  Reactive and reassuring  Recommendation by Maternal-Fetal Medicine for delivery between 37 at 39 weeks  Patient has history of prior  section and was decided between [facility] versus repeat  section  Patient reports that she would like to schedule a repeat  section   section scheduled for 2020 at 10:00  Patient follow-up in 1 week for continued weekly NSTs  She is having NST and REBECCA performed each week by Maternal-Fetal Medicine, Dr Steven Marquez office at the end of each week as well

## 2020-03-11 NOTE — PROGRESS NOTES
Assessment/Plan:    No problem-specific Assessment & Plan notes found for this encounter  Diagnoses and all orders for this visit:    Cervicalgia    Other orders  -     Blood Glucose Monitoring Suppl (ONE TOUCH ULTRA 2) w/Device KIT  -     ONE TOUCH ULTRA TEST test strip          Subjective:      Patient ID: Faviola Weston is a 47 y o  female      Here for cervical OMT  No new issues  For chronic neck pain no radicular symptoms      The following portions of the patient's history were reviewed and updated as appropriate: current medications, past family history, past medical history, past social history, past surgical history and problem list     Review of Systems   Constitutional:        See cc         Objective:      /72 (BP Location: Left arm, Patient Position: Sitting, Cuff Size: Large)   Pulse 94   Temp 98 2 °F (36 8 °C) (Tympanic)   Ht 5' 3" (1 6 m)   Wt 74 1 kg (163 lb 6 4 oz)   LMP 07/09/2019   SpO2 97%   BMI 28 95 kg/m²          Physical Exam   Musculoskeletal:   Cervical spine tender spasmotic area   Worked out with muscle energy , nuchal release and massage

## 2020-03-11 NOTE — PROGRESS NOTES
Assessment/Plan:    No problem-specific Assessment & Plan notes found for this encounter  Diagnoses and all orders for this visit:    Cervicalgia    Other orders  -     labetalol (NORMODYNE) 100 mg tablet; Take 100 mg by mouth daily  -     ferrous sulfate 325 (65 Fe) mg tablet; Take 325 mg by mouth daily with breakfast          Subjective:      Patient ID: Bryanna Cabrear is a 47 y o  female  Here for OMT  Has a lot of lower back pressure as pregnancy is advancing      The following portions of the patient's history were reviewed and updated as appropriate: current medications, past family history, past medical history, past social history, past surgical history and problem list     Review of Systems   Musculoskeletal:        See no numbenes         Objective:      BP 98/62   Pulse 84   Resp 18   Ht 5' 3" (1 6 m)   Wt 73 kg (161 lb)   LMP 07/09/2019   SpO2 98%   BMI 28 52 kg/m²          Physical Exam   Constitutional: She appears well-developed and well-nourished     Musculoskeletal:   Muscle energy, nuchal release and massage done with good results         OMT 1 region

## 2020-03-16 ENCOUNTER — ROUTINE PRENATAL (OUTPATIENT)
Dept: OBGYN CLINIC | Facility: CLINIC | Age: 40
End: 2020-03-16
Payer: COMMERCIAL

## 2020-03-16 VITALS — WEIGHT: 163.6 LBS | SYSTOLIC BLOOD PRESSURE: 110 MMHG | DIASTOLIC BLOOD PRESSURE: 70 MMHG | BODY MASS INDEX: 28.98 KG/M2

## 2020-03-16 DIAGNOSIS — Z34.83 ENCOUNTER FOR SUPERVISION OF OTHER NORMAL PREGNANCY IN THIRD TRIMESTER: Primary | ICD-10-CM

## 2020-03-16 DIAGNOSIS — Z3A.35 35 WEEKS GESTATION OF PREGNANCY: ICD-10-CM

## 2020-03-16 PROCEDURE — PNV: Performed by: OBSTETRICS & GYNECOLOGY

## 2020-03-16 PROCEDURE — 87653 STREP B DNA AMP PROBE: CPT | Performed by: OBSTETRICS & GYNECOLOGY

## 2020-03-16 PROCEDURE — 59025 FETAL NON-STRESS TEST: CPT | Performed by: OBSTETRICS & GYNECOLOGY

## 2020-03-16 NOTE — LETTER
March 16, 2020     Patient: Angelo Russell   YOB: 1980   Date of Visit: 3/16/2020       To Whom it May Concern:    Gerhardt Dust is under my professional care  She was seen in my office on 3/16/2020  Given her pregnancy and the current coronavirus pandemic, I recommend she perform non-clinical work to decrease her exposure risk  If you have any questions or concerns, please don't hesitate to call           Sincerely,          Ankur Bustos,         CC: No Recipients

## 2020-03-16 NOTE — PROGRESS NOTES
This is a 47 y o  [id number] at 35w6d who presents for return OB visit  Having irregular contractions  Denies VB and LOF  Endorses FM  BP: 110/70 TWlb  GBS done: Yes  PCN allergy: No  Labor precautions given  [address] reviewed  CHTN: Normotensive  NST today reactive and reassuring  Has NST with [facility] Thursday    Prior c/s: repeat scheduled with Dr Barnett Reach  F/up 1 wk

## 2020-03-16 NOTE — LETTER
Date: 3/16/2020    To whom it may concern: This is to certify that Neel Posey has been under my care for the following diagnosis: Pregnancy, planned delivery on 4/3/20  I feel that she is unable to serve on Jury Duty at this time for the above mentioned medical reasons                    Sincerely,   Kayden Donohue, DO

## 2020-03-18 DIAGNOSIS — M54.2 CERVICALGIA: ICD-10-CM

## 2020-03-19 ENCOUNTER — HOSPITAL ENCOUNTER (OUTPATIENT)
Facility: HOSPITAL | Age: 40
Discharge: HOME | End: 2020-03-19
Attending: OBSTETRICS & GYNECOLOGY | Admitting: OBSTETRICS & GYNECOLOGY
Payer: COMMERCIAL

## 2020-03-19 VITALS
RESPIRATION RATE: 16 BRPM | WEIGHT: 163 LBS | TEMPERATURE: 98.2 F | DIASTOLIC BLOOD PRESSURE: 73 MMHG | BODY MASS INDEX: 28.88 KG/M2 | SYSTOLIC BLOOD PRESSURE: 103 MMHG | HEIGHT: 63 IN | HEART RATE: 74 BPM

## 2020-03-19 DIAGNOSIS — Z86.69 H/O MIGRAINE DURING PREGNANCY: ICD-10-CM

## 2020-03-19 DIAGNOSIS — Z87.59 H/O MIGRAINE DURING PREGNANCY: ICD-10-CM

## 2020-03-19 PROBLEM — Z3A.36 36 WEEKS GESTATION OF PREGNANCY: Status: ACTIVE | Noted: 2020-01-21

## 2020-03-19 LAB
ALBUMIN SERPL BCP-MCNC: 2.5 G/DL (ref 3.5–5)
ALP SERPL-CCNC: 139 U/L (ref 46–116)
ALT SERPL W P-5'-P-CCNC: 15 U/L (ref 12–78)
ANION GAP SERPL CALCULATED.3IONS-SCNC: 9 MMOL/L (ref 4–13)
AST SERPL W P-5'-P-CCNC: 16 U/L (ref 5–45)
BACTERIA UR QL AUTO: ABNORMAL /HPF
BILIRUB SERPL-MCNC: 0.21 MG/DL (ref 0.2–1)
BILIRUB UR QL STRIP: NEGATIVE
BUN SERPL-MCNC: 9 MG/DL (ref 5–25)
CALCIUM SERPL-MCNC: 8.4 MG/DL (ref 8.3–10.1)
CHLORIDE SERPL-SCNC: 104 MMOL/L (ref 100–108)
CLARITY UR: CLEAR
CO2 SERPL-SCNC: 21 MMOL/L (ref 21–32)
COLOR UR: YELLOW
CREAT SERPL-MCNC: 0.5 MG/DL (ref 0.6–1.3)
CREAT UR-MCNC: 33 MG/DL
ERYTHROCYTE [DISTWIDTH] IN BLOOD BY AUTOMATED COUNT: 15.6 % (ref 11.6–15.1)
GFR SERPL CREATININE-BSD FRML MDRD: 123 ML/MIN/1.73SQ M
GLUCOSE SERPL-MCNC: 88 MG/DL (ref 65–140)
GLUCOSE UR STRIP-MCNC: NEGATIVE MG/DL
GP B STREP DNA SPEC QL NAA+PROBE: NORMAL
HCT VFR BLD AUTO: 35.9 % (ref 34.8–46.1)
HGB BLD-MCNC: 12.1 G/DL (ref 11.5–15.4)
HGB UR QL STRIP.AUTO: NEGATIVE
KETONES UR STRIP-MCNC: NEGATIVE MG/DL
LEUKOCYTE ESTERASE UR QL STRIP: ABNORMAL
MCH RBC QN AUTO: 32.5 PG (ref 26.8–34.3)
MCHC RBC AUTO-ENTMCNC: 33.7 G/DL (ref 31.4–37.4)
MCV RBC AUTO: 97 FL (ref 82–98)
NITRITE UR QL STRIP: NEGATIVE
NON-SQ EPI CELLS URNS QL MICRO: ABNORMAL /HPF
PH UR STRIP.AUTO: 6 [PH]
PLATELET # BLD AUTO: 215 THOUSANDS/UL (ref 149–390)
PMV BLD AUTO: 9.9 FL (ref 8.9–12.7)
POTASSIUM SERPL-SCNC: 3.8 MMOL/L (ref 3.5–5.3)
PROT SERPL-MCNC: 6.2 G/DL (ref 6.4–8.2)
PROT UR STRIP-MCNC: NEGATIVE MG/DL
PROT UR-MCNC: <6 MG/DL
PROT/CREAT UR: <0.18 MG/G{CREAT} (ref 0–0.1)
RBC # BLD AUTO: 3.72 MILLION/UL (ref 3.81–5.12)
RBC #/AREA URNS AUTO: ABNORMAL /HPF
SODIUM SERPL-SCNC: 134 MMOL/L (ref 136–145)
SP GR UR STRIP.AUTO: 1.01 (ref 1–1.03)
UROBILINOGEN UR QL STRIP.AUTO: 0.2 E.U./DL
WBC # BLD AUTO: 6.29 THOUSAND/UL (ref 4.31–10.16)
WBC #/AREA URNS AUTO: ABNORMAL /HPF

## 2020-03-19 PROCEDURE — 81001 URINALYSIS AUTO W/SCOPE: CPT | Performed by: OBSTETRICS & GYNECOLOGY

## 2020-03-19 PROCEDURE — 82570 ASSAY OF URINE CREATININE: CPT | Performed by: OBSTETRICS & GYNECOLOGY

## 2020-03-19 PROCEDURE — 85027 COMPLETE CBC AUTOMATED: CPT | Performed by: OBSTETRICS & GYNECOLOGY

## 2020-03-19 PROCEDURE — 80053 COMPREHEN METABOLIC PANEL: CPT | Performed by: OBSTETRICS & GYNECOLOGY

## 2020-03-19 PROCEDURE — 84156 ASSAY OF PROTEIN URINE: CPT | Performed by: OBSTETRICS & GYNECOLOGY

## 2020-03-19 PROCEDURE — NC001 PR NO CHARGE: Performed by: OBSTETRICS & GYNECOLOGY

## 2020-03-19 PROCEDURE — 99213 OFFICE O/P EST LOW 20 MIN: CPT

## 2020-03-19 RX ORDER — CHLORAL HYDRATE 500 MG
1000 CAPSULE ORAL DAILY
COMMUNITY

## 2020-03-19 NOTE — DISCHARGE INSTRUCTIONS
Preeclampsia   WHAT YOU NEED TO KNOW:   Preeclampsia is a condition that can develop during week 20 or later of your pregnancy  Preeclampsia means you have high blood pressure and may have protein in your urine  Preeclampsia can cause mild to life-threatening health problems for you and your unborn baby  DISCHARGE INSTRUCTIONS:   Call 911 for any of the following:   · You have a seizure  · You have severe abdominal pain with nausea and vomiting  Return to the emergency department if:   · You develop a severe headache that does not go away  · You have blurred or spotted vision that does not go away  · You are bleeding from your vagina  · You have new or increased swelling in your face or hands  · You are urinating little or not at all  Contact your obstetrician if:   · You are urinating less than usual      · You do not feel your baby's movement as often as usual      · You have questions or concerns about your condition or care  Medicines:   · Blood pressure medicine  helps lower your blood pressure and protects your heart, lungs, brain, and kidneys  Take your blood pressure medicine exactly as directed  · Low doses of aspirin  may be recommended after 12 weeks of pregnancy if you are at high risk for preeclampsia  Aspirin may help prevent preeclampsia or problems that can happen from preeclampsia  Do not take aspirin unless directed by your healthcare provider  · Take your medicine as directed  Contact your healthcare provider if you think your medicine is not helping or if you have side effects  Tell him of her if you are allergic to any medicine  Keep a list of the medicines, vitamins, and herbs you take  Include the amounts, and when and why you take them  Bring the list or the pill bottles to follow-up visits  Carry your medicine list with you in case of an emergency  Follow up with your obstetrician as directed: You will need tests 1 to 2 times a week to check your condition  Tests include blood pressure checks, urine and blood tests, and fetal monitoring  Write down your questions so you remember to ask them during your visits  Steps to take if you have a seizure: You may have seizures if you develop eclampsia  You may feel confused, tense, or aggressive when the seizure ends  Tell your family, friends, or coworkers to do the following if you have a seizure:  · Clear the area to help prevent injuries from falls    · Place you on your left side so you do not choke    · Give oxygen if it is available    · Call 911     · Stay with you until medical help arrives  Blood pressure checks: You may need to check your blood pressure each day  Your obstetrician will teach you how to check your blood pressure at home  Measure your blood pressure on the same arm and in the same position each time  Write down the date and time you take your blood pressure, and bring your notes to your prenatal visits  Kick counts: You may need to keep track of how often your baby moves or kicks over a certain amount of time  Ask your obstetrician how to do kick counts and how often to do them  Daily weight:  Weigh yourself daily before breakfast  Weight gain can be a sign of extra fluid in your body  Call your obstetrician if you have gained 2 or more pounds in a week  Rest:  Your obstetrician may tell you to rest more often if you have mild symptoms of preeclampsia  You may need total bedrest if you have more severe symptoms  Try to lie on your left side  © 2017 [address]  Information is for End User's use only and may not be sold, redistributed or otherwise used for commercial purposes  All illustrations and images included in CareNotes® are the copyrighted property of [employer] , [employer]  or [employer]  The above information is an  only  It is not intended as medical advice for individual conditions or treatments   Talk to your doctor, nurse or pharmacist before following any medical regimen to see if it is safe and effective for you

## 2020-03-19 NOTE — PROGRESS NOTES
L&D Triage Note - OB/GYN  Yakelin Bay 47 y o  female MRN: [id number]  Unit/Bed#: LD TRIAGE 3-01 Encounter: [id number]      Assessment:  47 y o  [id number] at 36w2d who presents with elevated blood pressures in the setting of chronic hypertension, headaches, and swelling of her hands and feet  Pre-eclampsia workup is negative, with PC ratio <0 18, and no abnormalities on labs  Plan:  1  Stable for discharge home at this time  Reviewed signs and symptoms of  labor, signs and symptoms of pre-eclampsia  Patient aware to call if she has any new symptoms or if blood pressure is elevated again prior to her next visit       ______________________________________________________________________      Chief Compliant: Chico Og worried about my headache and swelling    Subjective:  47 y o  [id number] at 36w2d who presents for evaluation for pre-eclampsia superimposed on chronic hypertension  She was previously on labetalol 200mg BID, and held it for a bit as she was having dizziness  However, her blood pressures increased again, and she has since restarted it  She was seen today at [facility] center at [facility], and was noted to have diastolic blood pressure >74  She has also been noticing some worsening headaches the past few days (she has a history of migraines), improved or resolved with tylenol or Fioricet  She has noted swelling of her hands and feet, and her wedding ring isn't fitting as well anymore  Given concern for newer cluster of symptoms and increasing blood pressure, it was recommended that she come to triage for evaluation  She denies visual changes or RUQ pain  She denies contractions, vaginal bleeding, leakage of fluid, or decreased fetal movement  Her history is notable for chronic hypertension on labetalol 200mg BID, history of  delivery, and intermittent chest pain with occasional PACs and PVCs on Holter monitor      Objective:  Vitals:    20 1145 20 1200 20 1215 20 1230   BP: 113/73 110/76 106/74 103/73   BP Location:       Pulse: 71 73 71 74   Resp:       Temp:       TempSrc:       Weight:       Height:             FHT:  135 / Moderate 6 - 25 bpm / Accelerations present, no decelerations  [place]: none    Lab Results   Component Value Date    WBC 6 29 03/19/2020    HGB 12 1 03/19/2020    HCT 35 9 03/19/2020    MCV 97 03/19/2020     03/19/2020     Lab Results   Component Value Date     12/27/2016    SODIUM 134 (L) 03/19/2020    K 3 8 03/19/2020     03/19/2020    CO2 21 03/19/2020    AGAP 9 03/19/2020    BUN 9 03/19/2020    CREATININE 0 50 (L) 03/19/2020    GLUC 88 03/19/2020    GLUF 90 09/26/2018    CALCIUM 8 4 03/19/2020    AST 16 03/19/2020    ALT 15 03/19/2020    ALKPHOS 139 (H) 03/19/2020    PROT 6 3 12/27/2016    TP 6 2 (L) 03/19/2020    BILITOT 0 2 12/27/2016    TBILI 0 21 03/19/2020    EGFR 123 03/19/2020     PC ratio: <0 18    Discussed with Dr Tamika Wu MD  3/19/2020 12:29 PM

## 2020-03-20 ENCOUNTER — TELEPHONE (OUTPATIENT)
Dept: OBGYN CLINIC | Facility: CLINIC | Age: 40
End: 2020-03-20

## 2020-03-20 DIAGNOSIS — Z86.69 H/O MIGRAINE DURING PREGNANCY: ICD-10-CM

## 2020-03-20 DIAGNOSIS — Z87.59 H/O MIGRAINE DURING PREGNANCY: ICD-10-CM

## 2020-03-20 RX ORDER — BUTALBITAL/ASPIRIN/CAFFEINE 50-325-40
1 CAPSULE ORAL EVERY 6 HOURS PRN
Qty: 15 CAPSULE | Refills: 0 | Status: SHIPPED | OUTPATIENT
Start: 2020-03-20 | End: 2020-04-10 | Stop reason: ALTCHOICE

## 2020-03-20 RX ORDER — ACETAMINOPHEN 325 MG/1
325 TABLET ORAL EVERY 6 HOURS PRN
Qty: 90 TABLET | Refills: 3 | Status: SHIPPED | OUTPATIENT
Start: 2020-03-20 | End: 2022-05-28 | Stop reason: SDUPTHER

## 2020-03-20 RX ORDER — BUTALBITAL/ASPIRIN/CAFFEINE 50-325-40
CAPSULE ORAL
Qty: 15 CAPSULE | OUTPATIENT
Start: 2020-03-20

## 2020-03-20 NOTE — TELEPHONE ENCOUNTER
Refill fiorcet sent  Rx tylenol sent  Stop iron infusions and suppliement  PreE labs normal, do not check  Vero has been on the phone a lot with her this morning

## 2020-03-20 NOTE — TELEPHONE ENCOUNTER
36w3d OB calling for refill on Fioricet  States she did preeclampsia workup yesterday with MFM  C/o headache no relieved by tylenol  Swelling in face and hands  Denies any visual changes, no epigastric, SOB, or chest     Routing to provider for advice  (needs refill ?  BP check)

## 2020-03-20 NOTE — TELEPHONE ENCOUNTER
Spoke with pt, Fioricet sent  Pt verbalized understanding  Pt also requests if she can get a RX for tylenol  States she looked for tylenol at 3 different pharmacies and they were all out of stock, pt doesn't want to keep going out to different areas looking for it due to exposure to COVID-19  Pt states she takes tylenol prior to trying Fioricet  Also pt states she missed a few iron infusions, had recent blood work to recheck levels  Wants to know if she should continue iron infusions or if taking oral iron supplement is good enough  Also wants to try to avoid leaving the house as much as she can       Uses [facility] and [facility]

## 2020-03-23 ENCOUNTER — ROUTINE PRENATAL (OUTPATIENT)
Dept: OBGYN CLINIC | Facility: CLINIC | Age: 40
End: 2020-03-23

## 2020-03-23 VITALS — WEIGHT: 167 LBS | BODY MASS INDEX: 29.58 KG/M2 | DIASTOLIC BLOOD PRESSURE: 62 MMHG | SYSTOLIC BLOOD PRESSURE: 112 MMHG

## 2020-03-23 DIAGNOSIS — O10.919 CHRONIC HYPERTENSION AFFECTING PREGNANCY: ICD-10-CM

## 2020-03-23 DIAGNOSIS — Z3A.36 36 WEEKS GESTATION OF PREGNANCY: ICD-10-CM

## 2020-03-23 DIAGNOSIS — M54.2 CERVICALGIA: ICD-10-CM

## 2020-03-23 DIAGNOSIS — Z98.891 HISTORY OF CESAREAN DELIVERY: ICD-10-CM

## 2020-03-23 DIAGNOSIS — Z34.83 PRENATAL CARE, SUBSEQUENT PREGNANCY IN THIRD TRIMESTER: ICD-10-CM

## 2020-03-23 DIAGNOSIS — O09.529 ANTEPARTUM MULTIGRAVIDA OF ADVANCED MATERNAL AGE: Primary | ICD-10-CM

## 2020-03-23 PROCEDURE — PNV: Performed by: OBSTETRICS & GYNECOLOGY

## 2020-03-23 RX ORDER — HYDROXYZINE HYDROCHLORIDE 25 MG/1
TABLET, FILM COATED ORAL
COMMUNITY
Start: 2020-03-13 | End: 2020-07-07 | Stop reason: SDUPTHER

## 2020-03-23 NOTE — PROGRESS NOTES
47 y o  [id number]  female at [address] for PNV  BP : 112/62  TWlbs  Discussed headaches  Patient does report seasonal allergies but states this feels different  She just woke up so she thinks that will improve when she eats and drinks some more  She will monitor the headache  She took Fioricet yesterday  She discussed moving up her   She is currently scheduled at 38 weeks  Based on her history of hypertension on labetalol, we recommend delivery as early as 37 weeks and in light of the  [address] pandemic, I recommend we move her  up to this Thursday at 12:30 p m  Steff Lory Patient had many questions regarding this,  which were all answered to apparent satisfaction  We discussed that we are moving to deliver patient's towards the early and of all recommended intervals of delivery due to the unknown of COVID19, which is in ever changing situation  We discussed that we are taking precautions at the hospital to minimize exposure  She is allowed to have her  at the hospital   She did have issues with her epidural with her last delivery and was put to sleep  She has a history of scoliosis  She was supposed to see Dr Paige Ayala on Friday for a pre anesthesia consult  I will send Dr Paige Ayala a message today  NST reassuring today

## 2020-03-24 RX ORDER — TRAMADOL HYDROCHLORIDE 50 MG/1
50 TABLET ORAL EVERY 8 HOURS PRN
Qty: 90 TABLET | Refills: 0 | Status: SHIPPED | OUTPATIENT
Start: 2020-03-24 | End: 2020-04-20

## 2020-03-24 RX ORDER — TRAMADOL HYDROCHLORIDE 50 MG/1
TABLET ORAL
Qty: 90 TABLET | OUTPATIENT
Start: 2020-03-24

## 2020-03-24 RX ORDER — OXYCODONE HYDROCHLORIDE AND ACETAMINOPHEN 5; 325 MG/1; MG/1
1 TABLET ORAL 2 TIMES DAILY
Qty: 60 TABLET | Refills: 0 | Status: SHIPPED | OUTPATIENT
Start: 2020-03-24 | End: 2020-03-28 | Stop reason: HOSPADM

## 2020-03-26 ENCOUNTER — ANESTHESIA (INPATIENT)
Dept: LABOR AND DELIVERY | Facility: HOSPITAL | Age: 40
End: 2020-03-26
Payer: COMMERCIAL

## 2020-03-26 ENCOUNTER — ANESTHESIA EVENT (INPATIENT)
Dept: LABOR AND DELIVERY | Facility: HOSPITAL | Age: 40
End: 2020-03-26
Payer: COMMERCIAL

## 2020-03-26 ENCOUNTER — HOSPITAL ENCOUNTER (INPATIENT)
Facility: HOSPITAL | Age: 40
LOS: 2 days | Discharge: HOME | End: 2020-03-28
Attending: OBSTETRICS & GYNECOLOGY | Admitting: OBSTETRICS & GYNECOLOGY
Payer: COMMERCIAL

## 2020-03-26 DIAGNOSIS — I10 CHRONIC HYPERTENSION: ICD-10-CM

## 2020-03-26 DIAGNOSIS — Z3A.37 37 WEEKS GESTATION OF PREGNANCY: Primary | ICD-10-CM

## 2020-03-26 DIAGNOSIS — O34.219 PREVIOUS CESAREAN SECTION COMPLICATING PREGNANCY: ICD-10-CM

## 2020-03-26 PROBLEM — Z98.891 S/P REPEAT LOW TRANSVERSE C-SECTION: Status: ACTIVE | Noted: 2020-03-26

## 2020-03-26 LAB
ABO GROUP BLD: NORMAL
ALBUMIN SERPL BCP-MCNC: 2.5 G/DL (ref 3.5–5)
ALP SERPL-CCNC: 165 U/L (ref 46–116)
ALT SERPL W P-5'-P-CCNC: 21 U/L (ref 12–78)
ANION GAP SERPL CALCULATED.3IONS-SCNC: 11 MMOL/L (ref 4–13)
AST SERPL W P-5'-P-CCNC: 26 U/L (ref 5–45)
BASE EXCESS BLDCOA CALC-SCNC: -4.7 MMOL/L (ref 3–11)
BASE EXCESS BLDCOV CALC-SCNC: -3.8 MMOL/L (ref 1–9)
BILIRUB SERPL-MCNC: 0.29 MG/DL (ref 0.2–1)
BLD GP AB SCN SERPL QL: NEGATIVE
BUN SERPL-MCNC: 10 MG/DL (ref 5–25)
CALCIUM SERPL-MCNC: 8.8 MG/DL (ref 8.3–10.1)
CHLORIDE SERPL-SCNC: 101 MMOL/L (ref 100–108)
CO2 SERPL-SCNC: 22 MMOL/L (ref 21–32)
CREAT SERPL-MCNC: 0.55 MG/DL (ref 0.6–1.3)
CREAT UR-MCNC: 138 MG/DL
ERYTHROCYTE [DISTWIDTH] IN BLOOD BY AUTOMATED COUNT: 14.7 % (ref 11.6–15.1)
GFR SERPL CREATININE-BSD FRML MDRD: 119 ML/MIN/1.73SQ M
GLUCOSE SERPL-MCNC: 85 MG/DL (ref 65–140)
HCO3 BLDCOA-SCNC: 22.2 MMOL/L (ref 17.3–27.3)
HCO3 BLDCOV-SCNC: 21.5 MMOL/L (ref 12.2–28.6)
HCT VFR BLD AUTO: 38.2 % (ref 34.8–46.1)
HGB BLD-MCNC: 12.6 G/DL (ref 11.5–15.4)
MCH RBC QN AUTO: 31.9 PG (ref 26.8–34.3)
MCHC RBC AUTO-ENTMCNC: 33 G/DL (ref 31.4–37.4)
MCV RBC AUTO: 97 FL (ref 82–98)
O2 CT VFR BLDCOA CALC: 8.3 ML/DL
OXYHGB MFR BLDCOA: 47.2 %
OXYHGB MFR BLDCOV: 72 %
PCO2 BLDCOA: 48.3 MM[HG] (ref 30–60)
PCO2 BLDCOV: 39.8 MM HG (ref 27–43)
PH BLDCOA: 7.28 [PH] (ref 7.23–7.43)
PH BLDCOV: 7.35 [PH] (ref 7.19–7.49)
PLATELET # BLD AUTO: 212 THOUSANDS/UL (ref 149–390)
PMV BLD AUTO: 9.9 FL (ref 8.9–12.7)
PO2 BLDCOA: 20.8 MM HG (ref 5–25)
PO2 BLDCOV: 29.3 MM HG (ref 15–45)
POTASSIUM SERPL-SCNC: 3.9 MMOL/L (ref 3.5–5.3)
PROT SERPL-MCNC: 6.5 G/DL (ref 6.4–8.2)
PROT UR-MCNC: 20 MG/DL
PROT/CREAT UR: 0.14 MG/G{CREAT} (ref 0–0.1)
RBC # BLD AUTO: 3.95 MILLION/UL (ref 3.81–5.12)
RH BLD: POSITIVE
RPR SER QL: NORMAL
SAO2 % BLDCOV: 13.4 ML/DL
SODIUM SERPL-SCNC: 134 MMOL/L (ref 136–145)
SPECIMEN EXPIRATION DATE: NORMAL
WBC # BLD AUTO: 5.86 THOUSAND/UL (ref 4.31–10.16)

## 2020-03-26 PROCEDURE — 84156 ASSAY OF PROTEIN URINE: CPT | Performed by: OBSTETRICS & GYNECOLOGY

## 2020-03-26 PROCEDURE — 86901 BLOOD TYPING SEROLOGIC RH(D): CPT | Performed by: OBSTETRICS & GYNECOLOGY

## 2020-03-26 PROCEDURE — 82570 ASSAY OF URINE CREATININE: CPT | Performed by: OBSTETRICS & GYNECOLOGY

## 2020-03-26 PROCEDURE — 82805 BLOOD GASES W/O2 SATURATION: CPT | Performed by: OBSTETRICS & GYNECOLOGY

## 2020-03-26 PROCEDURE — 80053 COMPREHEN METABOLIC PANEL: CPT | Performed by: OBSTETRICS & GYNECOLOGY

## 2020-03-26 PROCEDURE — 86900 BLOOD TYPING SEROLOGIC ABO: CPT | Performed by: OBSTETRICS & GYNECOLOGY

## 2020-03-26 PROCEDURE — 86592 SYPHILIS TEST NON-TREP QUAL: CPT | Performed by: OBSTETRICS & GYNECOLOGY

## 2020-03-26 PROCEDURE — 86850 RBC ANTIBODY SCREEN: CPT | Performed by: OBSTETRICS & GYNECOLOGY

## 2020-03-26 PROCEDURE — 99024 POSTOP FOLLOW-UP VISIT: CPT | Performed by: OBSTETRICS & GYNECOLOGY

## 2020-03-26 PROCEDURE — 85027 COMPLETE CBC AUTOMATED: CPT | Performed by: OBSTETRICS & GYNECOLOGY

## 2020-03-26 PROCEDURE — 59510 CESAREAN DELIVERY: CPT | Performed by: OBSTETRICS & GYNECOLOGY

## 2020-03-26 RX ORDER — OXYCODONE HYDROCHLORIDE AND ACETAMINOPHEN 5; 325 MG/1; MG/1
1 TABLET ORAL EVERY 6 HOURS PRN
Status: DISCONTINUED | OUTPATIENT
Start: 2020-03-26 | End: 2020-03-28 | Stop reason: HOSPADM

## 2020-03-26 RX ORDER — KETOROLAC TROMETHAMINE 30 MG/ML
INJECTION, SOLUTION INTRAMUSCULAR; INTRAVENOUS AS NEEDED
Status: DISCONTINUED | OUTPATIENT
Start: 2020-03-26 | End: 2020-03-26 | Stop reason: SURG

## 2020-03-26 RX ORDER — BISACODYL 10 MG
10 SUPPOSITORY, RECTAL RECTAL DAILY PRN
Status: DISCONTINUED | OUTPATIENT
Start: 2020-03-26 | End: 2020-03-28 | Stop reason: HOSPADM

## 2020-03-26 RX ORDER — ONDANSETRON 2 MG/ML
INJECTION INTRAMUSCULAR; INTRAVENOUS AS NEEDED
Status: DISCONTINUED | OUTPATIENT
Start: 2020-03-26 | End: 2020-03-26 | Stop reason: SURG

## 2020-03-26 RX ORDER — ONDANSETRON 2 MG/ML
4 INJECTION INTRAMUSCULAR; INTRAVENOUS EVERY 4 HOURS PRN
Status: ACTIVE | OUTPATIENT
Start: 2020-03-26 | End: 2020-03-27

## 2020-03-26 RX ORDER — SODIUM CHLORIDE, SODIUM LACTATE, POTASSIUM CHLORIDE, CALCIUM CHLORIDE 600; 310; 30; 20 MG/100ML; MG/100ML; MG/100ML; MG/100ML
125 INJECTION, SOLUTION INTRAVENOUS CONTINUOUS
Status: DISCONTINUED | OUTPATIENT
Start: 2020-03-26 | End: 2020-03-26

## 2020-03-26 RX ORDER — ONDANSETRON 2 MG/ML
4 INJECTION INTRAMUSCULAR; INTRAVENOUS EVERY 8 HOURS PRN
Status: DISCONTINUED | OUTPATIENT
Start: 2020-03-26 | End: 2020-03-28 | Stop reason: HOSPADM

## 2020-03-26 RX ORDER — CEFAZOLIN SODIUM 2 G/50ML
2000 SOLUTION INTRAVENOUS ONCE
Status: COMPLETED | OUTPATIENT
Start: 2020-03-26 | End: 2020-03-26

## 2020-03-26 RX ORDER — SODIUM CHLORIDE, SODIUM LACTATE, POTASSIUM CHLORIDE, CALCIUM CHLORIDE 600; 310; 30; 20 MG/100ML; MG/100ML; MG/100ML; MG/100ML
125 INJECTION, SOLUTION INTRAVENOUS CONTINUOUS
Status: DISCONTINUED | OUTPATIENT
Start: 2020-03-26 | End: 2020-03-28 | Stop reason: HOSPADM

## 2020-03-26 RX ORDER — METOCLOPRAMIDE HYDROCHLORIDE 5 MG/ML
INJECTION INTRAMUSCULAR; INTRAVENOUS AS NEEDED
Status: DISCONTINUED | OUTPATIENT
Start: 2020-03-26 | End: 2020-03-26 | Stop reason: SURG

## 2020-03-26 RX ORDER — IBUPROFEN 600 MG/1
600 TABLET ORAL EVERY 6 HOURS PRN
Status: DISCONTINUED | OUTPATIENT
Start: 2020-03-27 | End: 2020-03-28 | Stop reason: HOSPADM

## 2020-03-26 RX ORDER — ACETAMINOPHEN 325 MG/1
975 TABLET ORAL EVERY 6 HOURS
Status: DISCONTINUED | OUTPATIENT
Start: 2020-03-26 | End: 2020-03-28 | Stop reason: HOSPADM

## 2020-03-26 RX ORDER — HYDROMORPHONE HCL/PF 1 MG/ML
0.5 SYRINGE (ML) INJECTION EVERY 2 HOUR PRN
Status: DISCONTINUED | OUTPATIENT
Start: 2020-03-26 | End: 2020-03-28 | Stop reason: HOSPADM

## 2020-03-26 RX ORDER — ACETAMINOPHEN 325 MG/1
650 TABLET ORAL EVERY 6 HOURS PRN
Status: DISCONTINUED | OUTPATIENT
Start: 2020-03-26 | End: 2020-03-28 | Stop reason: HOSPADM

## 2020-03-26 RX ORDER — IBUPROFEN 600 MG/1
600 TABLET ORAL EVERY 6 HOURS SCHEDULED
Status: DISCONTINUED | OUTPATIENT
Start: 2020-03-26 | End: 2020-03-26

## 2020-03-26 RX ORDER — DIPHENHYDRAMINE HYDROCHLORIDE 50 MG/ML
25 INJECTION INTRAMUSCULAR; INTRAVENOUS EVERY 6 HOURS PRN
Status: ACTIVE | OUTPATIENT
Start: 2020-03-26 | End: 2020-03-27

## 2020-03-26 RX ORDER — SIMETHICONE 80 MG
80 TABLET,CHEWABLE ORAL 4 TIMES DAILY PRN
Status: DISCONTINUED | OUTPATIENT
Start: 2020-03-26 | End: 2020-03-28 | Stop reason: HOSPADM

## 2020-03-26 RX ORDER — DEXAMETHASONE SODIUM PHOSPHATE 4 MG/ML
8 INJECTION, SOLUTION INTRA-ARTICULAR; INTRALESIONAL; INTRAMUSCULAR; INTRAVENOUS; SOFT TISSUE ONCE AS NEEDED
Status: ACTIVE | OUTPATIENT
Start: 2020-03-26 | End: 2020-03-27

## 2020-03-26 RX ORDER — CALCIUM CARBONATE 200(500)MG
1000 TABLET,CHEWABLE ORAL 3 TIMES DAILY PRN
Status: DISCONTINUED | OUTPATIENT
Start: 2020-03-26 | End: 2020-03-28 | Stop reason: HOSPADM

## 2020-03-26 RX ORDER — MORPHINE SULFATE 0.5 MG/ML
INJECTION, SOLUTION EPIDURAL; INTRATHECAL; INTRAVENOUS AS NEEDED
Status: DISCONTINUED | OUTPATIENT
Start: 2020-03-26 | End: 2020-03-26 | Stop reason: SURG

## 2020-03-26 RX ORDER — DEXAMETHASONE SODIUM PHOSPHATE 4 MG/ML
INJECTION, SOLUTION INTRA-ARTICULAR; INTRALESIONAL; INTRAMUSCULAR; INTRAVENOUS; SOFT TISSUE AS NEEDED
Status: DISCONTINUED | OUTPATIENT
Start: 2020-03-26 | End: 2020-03-26 | Stop reason: SURG

## 2020-03-26 RX ORDER — NALOXONE HYDROCHLORIDE 0.4 MG/ML
0.1 INJECTION, SOLUTION INTRAMUSCULAR; INTRAVENOUS; SUBCUTANEOUS
Status: ACTIVE | OUTPATIENT
Start: 2020-03-26 | End: 2020-03-27

## 2020-03-26 RX ORDER — OXYTOCIN/RINGER'S LACTATE 30/500 ML
62.5 PLASTIC BAG, INJECTION (ML) INTRAVENOUS CONTINUOUS
Status: DISPENSED | OUTPATIENT
Start: 2020-03-26 | End: 2020-03-26

## 2020-03-26 RX ORDER — ONDANSETRON 2 MG/ML
4 INJECTION INTRAMUSCULAR; INTRAVENOUS EVERY 8 HOURS PRN
Status: DISCONTINUED | OUTPATIENT
Start: 2020-03-27 | End: 2020-03-26

## 2020-03-26 RX ORDER — FENTANYL CITRATE/PF 50 MCG/ML
50 SYRINGE (ML) INJECTION
Status: DISCONTINUED | OUTPATIENT
Start: 2020-03-26 | End: 2020-03-26

## 2020-03-26 RX ORDER — METOCLOPRAMIDE HYDROCHLORIDE 5 MG/ML
5 INJECTION INTRAMUSCULAR; INTRAVENOUS EVERY 6 HOURS PRN
Status: ACTIVE | OUTPATIENT
Start: 2020-03-26 | End: 2020-03-27

## 2020-03-26 RX ORDER — KETOROLAC TROMETHAMINE 30 MG/ML
15 INJECTION, SOLUTION INTRAMUSCULAR; INTRAVENOUS EVERY 6 HOURS
Status: DISPENSED | OUTPATIENT
Start: 2020-03-26 | End: 2020-03-28

## 2020-03-26 RX ORDER — BUPIVACAINE HYDROCHLORIDE 7.5 MG/ML
INJECTION, SOLUTION INTRASPINAL AS NEEDED
Status: DISCONTINUED | OUTPATIENT
Start: 2020-03-26 | End: 2020-03-26 | Stop reason: SURG

## 2020-03-26 RX ORDER — ONDANSETRON 2 MG/ML
4 INJECTION INTRAMUSCULAR; INTRAVENOUS EVERY 8 HOURS PRN
Status: DISCONTINUED | OUTPATIENT
Start: 2020-03-26 | End: 2020-03-26

## 2020-03-26 RX ORDER — LABETALOL 100 MG/1
100 TABLET, FILM COATED ORAL DAILY
Status: DISCONTINUED | OUTPATIENT
Start: 2020-03-26 | End: 2020-03-28 | Stop reason: HOSPADM

## 2020-03-26 RX ORDER — LABETALOL 100 MG/1
100 TABLET, FILM COATED ORAL DAILY
Status: DISCONTINUED | OUTPATIENT
Start: 2020-03-26 | End: 2020-03-26

## 2020-03-26 RX ORDER — TRISODIUM CITRATE DIHYDRATE AND CITRIC ACID MONOHYDRATE 500; 334 MG/5ML; MG/5ML
30 SOLUTION ORAL ONCE
Status: DISCONTINUED | OUTPATIENT
Start: 2020-03-26 | End: 2020-03-26

## 2020-03-26 RX ORDER — EPHEDRINE SULFATE 50 MG/ML
INJECTION INTRAVENOUS AS NEEDED
Status: DISCONTINUED | OUTPATIENT
Start: 2020-03-26 | End: 2020-03-26 | Stop reason: SURG

## 2020-03-26 RX ORDER — DOCUSATE SODIUM 100 MG/1
100 CAPSULE, LIQUID FILLED ORAL 2 TIMES DAILY
Status: DISCONTINUED | OUTPATIENT
Start: 2020-03-26 | End: 2020-03-28 | Stop reason: HOSPADM

## 2020-03-26 RX ADMIN — BUPIVACAINE HYDROCHLORIDE IN DEXTROSE 1.6 ML: 7.5 INJECTION, SOLUTION SUBARACHNOID at 12:53

## 2020-03-26 RX ADMIN — SODIUM CHLORIDE, SODIUM LACTATE, POTASSIUM CHLORIDE, AND CALCIUM CHLORIDE 1000 ML: .6; .31; .03; .02 INJECTION, SOLUTION INTRAVENOUS at 11:09

## 2020-03-26 RX ADMIN — SODIUM CHLORIDE, SODIUM LACTATE, POTASSIUM CHLORIDE, AND CALCIUM CHLORIDE 125 ML/HR: .6; .31; .03; .02 INJECTION, SOLUTION INTRAVENOUS at 11:43

## 2020-03-26 RX ADMIN — KETOROLAC TROMETHAMINE 30 MG: 30 INJECTION, SOLUTION INTRAMUSCULAR at 13:34

## 2020-03-26 RX ADMIN — IBUPROFEN 600 MG: 600 TABLET ORAL at 16:54

## 2020-03-26 RX ADMIN — PHENYLEPHRINE HYDROCHLORIDE 40 MCG/MIN: 10 INJECTION INTRAVENOUS at 12:59

## 2020-03-26 RX ADMIN — METOCLOPRAMIDE HYDROCHLORIDE 10 MG: 5 INJECTION INTRAMUSCULAR; INTRAVENOUS at 12:50

## 2020-03-26 RX ADMIN — MORPHINE SULFATE 2.5 MG: 0.5 INJECTION, SOLUTION EPIDURAL; INTRATHECAL; INTRAVENOUS at 13:39

## 2020-03-26 RX ADMIN — DEXAMETHASONE SODIUM PHOSPHATE 4 MG: 4 INJECTION, SOLUTION INTRAMUSCULAR; INTRAVENOUS at 13:41

## 2020-03-26 RX ADMIN — MORPHINE SULFATE 0.2 MG: 0.5 INJECTION, SOLUTION EPIDURAL; INTRATHECAL; INTRAVENOUS at 12:53

## 2020-03-26 RX ADMIN — KETOROLAC TROMETHAMINE 15 MG: 30 INJECTION, SOLUTION INTRAMUSCULAR at 23:36

## 2020-03-26 RX ADMIN — DOCUSATE SODIUM 100 MG: 100 CAPSULE, LIQUID FILLED ORAL at 18:07

## 2020-03-26 RX ADMIN — SODIUM CHLORIDE, SODIUM LACTATE, POTASSIUM CHLORIDE, AND CALCIUM CHLORIDE 125 ML/HR: .6; .31; .03; .02 INJECTION, SOLUTION INTRAVENOUS at 20:12

## 2020-03-26 RX ADMIN — OXYCODONE HYDROCHLORIDE AND ACETAMINOPHEN 1 TABLET: 5; 325 TABLET ORAL at 21:40

## 2020-03-26 RX ADMIN — CEFAZOLIN SODIUM 2000 MG: 2 SOLUTION INTRAVENOUS at 12:46

## 2020-03-26 RX ADMIN — SODIUM CHLORIDE, SODIUM LACTATE, POTASSIUM CHLORIDE, AND CALCIUM CHLORIDE 500 ML: .6; .31; .03; .02 INJECTION, SOLUTION INTRAVENOUS at 18:19

## 2020-03-26 RX ADMIN — Medication 62.5 MILLI-UNITS/MIN: at 15:07

## 2020-03-26 RX ADMIN — EPHEDRINE SULFATE 15 MG: 50 INJECTION, SOLUTION INTRAVENOUS at 12:55

## 2020-03-26 RX ADMIN — ACETAMINOPHEN 650 MG: 325 TABLET, FILM COATED ORAL at 15:55

## 2020-03-26 RX ADMIN — ONDANSETRON 4 MG: 2 INJECTION INTRAMUSCULAR; INTRAVENOUS at 12:50

## 2020-03-26 RX ADMIN — MORPHINE SULFATE 2.3 MG: 0.5 INJECTION, SOLUTION EPIDURAL; INTRATHECAL; INTRAVENOUS at 13:52

## 2020-03-26 NOTE — ANESTHESIA POSTPROCEDURE EVALUATION
Post-Op Assessment Note    CV Status:  Stable  Pain Score: 2    Pain management: adequate     Mental Status:  Alert and awake   Hydration Status:  Euvolemic   PONV Controlled:  Controlled   Airway Patency:  Patent   Post Op Vitals Reviewed: Yes      Staff: CRNA           BP   131/68   Temp      Pulse  71   Resp   22   SpO2   98 RA

## 2020-03-26 NOTE — ANESTHESIA PROCEDURE NOTES
Spinal Block    Patient location during procedure: OR  Start time: 3/26/2020 12:53 PM  Reason for block: procedure for pain and at surgeon's request  Staffing  Resident/CRNA: Mars Pride CRNA  Performed: resident/CRNA   Preanesthetic Checklist  Completed: patient identified, site marked, surgical consent, pre-op evaluation, timeout performed, IV checked, risks and benefits discussed and monitors and equipment checked  Spinal Block  Patient position: sitting  Prep: ChloraPrep  Patient monitoring: cardiac monitor and frequent blood pressure checks  Approach: midline  Location: L3-4  Injection technique: single-shot  Needle  Needle type: pencil-tip   Needle gauge: 25 G  Needle length: 10 cm  Assessment  Sensory level: T4  Injection Assessment:  negative aspiration for heme, no paresthesia on injection and positive aspiration for clear CSF    Post-procedure:  adhesive bandage applied, pressure dressing applied, secured with tape, site cleaned and sterile dressing applied

## 2020-03-26 NOTE — ANESTHESIA PREPROCEDURE EVALUATION
Review of Systems/Medical History  Patient summary reviewed  Chart reviewed  No history of anesthetic complications     Cardiovascular  Exercise tolerance (METS): >4,  Hypertension ,    Pulmonary  Smoker ex-smoker  ,        GI/Hepatic    GERD , Liver disease ,        Negative  ROS        Endo/Other  Negative endo/other ROS      GYN  Currently pregnant ,          Hematology  Anemia ,  No coagulation disorder ,    Musculoskeletal  No back pain ,        Neurology    Headaches,    Psychology   Anxiety,              Physical Exam    Airway    Mallampati score: III  TM Distance: >3 FB  Neck ROM: full     Dental   No notable dental hx     Cardiovascular      Pulmonary      Other Findings        Anesthesia Plan  ASA Score- 2     Anesthesia Type- spinal with ASA Monitors  Additional Monitors:   Airway Plan:     Comment: Per patient, appropriately NPO, denies active CP/SOB/wheezing/symptoms related to heartburn/nausea/vomiting  Plan Factors-  Patient did not smoke on day of surgery  Induction-     Postoperative Plan- Plan for postoperative opioid use  Informed Consent- Anesthetic plan and risks discussed with patient  I personally reviewed this patient with the CRNA  Discussed and agreed on the Anesthesia Plan with the CRNA  Elin Osei

## 2020-03-27 LAB
ERYTHROCYTE [DISTWIDTH] IN BLOOD BY AUTOMATED COUNT: 14.5 % (ref 11.6–15.1)
HCT VFR BLD AUTO: 28.9 % (ref 34.8–46.1)
HGB BLD-MCNC: 9.6 G/DL (ref 11.5–15.4)
MCH RBC QN AUTO: 32.4 PG (ref 26.8–34.3)
MCHC RBC AUTO-ENTMCNC: 33.2 G/DL (ref 31.4–37.4)
MCV RBC AUTO: 98 FL (ref 82–98)
PLATELET # BLD AUTO: 193 THOUSANDS/UL (ref 149–390)
PMV BLD AUTO: 10.3 FL (ref 8.9–12.7)
RBC # BLD AUTO: 2.96 MILLION/UL (ref 3.81–5.12)
WBC # BLD AUTO: 9.31 THOUSAND/UL (ref 4.31–10.16)

## 2020-03-27 PROCEDURE — 99024 POSTOP FOLLOW-UP VISIT: CPT | Performed by: OBSTETRICS & GYNECOLOGY

## 2020-03-27 PROCEDURE — 85027 COMPLETE CBC AUTOMATED: CPT | Performed by: OBSTETRICS & GYNECOLOGY

## 2020-03-27 RX ADMIN — IBUPROFEN 600 MG: 600 TABLET ORAL at 15:38

## 2020-03-27 RX ADMIN — ACETAMINOPHEN 975 MG: 325 TABLET, FILM COATED ORAL at 17:42

## 2020-03-27 RX ADMIN — KETOROLAC TROMETHAMINE 15 MG: 30 INJECTION, SOLUTION INTRAMUSCULAR at 08:10

## 2020-03-27 RX ADMIN — OXYCODONE HYDROCHLORIDE AND ACETAMINOPHEN 1 TABLET: 5; 325 TABLET ORAL at 15:38

## 2020-03-27 RX ADMIN — LABETALOL HYDROCHLORIDE 100 MG: 100 TABLET, FILM COATED ORAL at 08:15

## 2020-03-27 RX ADMIN — ACETAMINOPHEN 975 MG: 325 TABLET, FILM COATED ORAL at 10:42

## 2020-03-27 RX ADMIN — SIMETHICONE CHEW TAB 80 MG 80 MG: 80 TABLET ORAL at 10:43

## 2020-03-27 RX ADMIN — DOCUSATE SODIUM 100 MG: 100 CAPSULE, LIQUID FILLED ORAL at 17:43

## 2020-03-27 RX ADMIN — OXYCODONE HYDROCHLORIDE AND ACETAMINOPHEN 1 TABLET: 5; 325 TABLET ORAL at 04:11

## 2020-03-27 RX ADMIN — SODIUM CHLORIDE, SODIUM LACTATE, POTASSIUM CHLORIDE, AND CALCIUM CHLORIDE 125 ML/HR: .6; .31; .03; .02 INJECTION, SOLUTION INTRAVENOUS at 04:12

## 2020-03-27 RX ADMIN — DOCUSATE SODIUM 100 MG: 100 CAPSULE, LIQUID FILLED ORAL at 08:12

## 2020-03-28 VITALS
RESPIRATION RATE: 18 BRPM | HEIGHT: 63 IN | HEART RATE: 80 BPM | WEIGHT: 167 LBS | SYSTOLIC BLOOD PRESSURE: 125 MMHG | TEMPERATURE: 98.3 F | OXYGEN SATURATION: 97 % | BODY MASS INDEX: 29.59 KG/M2 | DIASTOLIC BLOOD PRESSURE: 75 MMHG

## 2020-03-28 RX ORDER — OXYCODONE HYDROCHLORIDE 5 MG/1
5 TABLET ORAL EVERY 4 HOURS PRN
Qty: 5 TABLET | Refills: 0 | Status: SHIPPED | OUTPATIENT
Start: 2020-03-28 | End: 2020-04-03 | Stop reason: HOSPADM

## 2020-03-28 RX ORDER — IBUPROFEN 600 MG/1
600 TABLET ORAL EVERY 6 HOURS PRN
Qty: 30 TABLET | Refills: 0 | Status: SHIPPED | OUTPATIENT
Start: 2020-03-28 | End: 2020-06-30 | Stop reason: ALTCHOICE

## 2020-03-28 RX ORDER — DOCUSATE SODIUM 100 MG/1
100 CAPSULE, LIQUID FILLED ORAL 2 TIMES DAILY
Qty: 10 CAPSULE | Refills: 0 | Status: SHIPPED | OUTPATIENT
Start: 2020-03-28 | End: 2020-04-16

## 2020-03-28 RX ADMIN — OXYCODONE HYDROCHLORIDE AND ACETAMINOPHEN 1 TABLET: 5; 325 TABLET ORAL at 00:12

## 2020-03-28 RX ADMIN — DOCUSATE SODIUM 100 MG: 100 CAPSULE, LIQUID FILLED ORAL at 09:50

## 2020-03-28 RX ADMIN — LABETALOL HYDROCHLORIDE 100 MG: 100 TABLET, FILM COATED ORAL at 09:50

## 2020-03-28 RX ADMIN — ACETAMINOPHEN 975 MG: 325 TABLET, FILM COATED ORAL at 05:00

## 2020-03-28 RX ADMIN — IBUPROFEN 600 MG: 600 TABLET ORAL at 00:12

## 2020-03-28 RX ADMIN — IBUPROFEN 600 MG: 600 TABLET ORAL at 09:50

## 2020-03-28 RX ADMIN — OXYCODONE HYDROCHLORIDE AND ACETAMINOPHEN 1 TABLET: 5; 325 TABLET ORAL at 14:05

## 2020-03-30 ENCOUNTER — TRANSITIONAL CARE MANAGEMENT (OUTPATIENT)
Dept: FAMILY MEDICINE CLINIC | Facility: CLINIC | Age: 40
End: 2020-03-30

## 2020-03-30 ENCOUNTER — TELEPHONE (OUTPATIENT)
Dept: OBGYN CLINIC | Facility: CLINIC | Age: 40
End: 2020-03-30

## 2020-03-30 DIAGNOSIS — Z3A.37 37 WEEKS GESTATION OF PREGNANCY: ICD-10-CM

## 2020-03-30 NOTE — TELEPHONE ENCOUNTER
PO4 C/S states she is having some burning at incision with movement  Also noted area under incision to be red, swollen, tender to touch and bruised  States incision is clean dry and intact  Denies fever but does have chills at night  She is nursing and milk is starting to come in  She has been taking Motrin and Oxycodone ( was only given 5 and has 2 left)  States motrin does help a little  Advised she can take tylenol in between Motrin  Concerned because she only got Oxycodone 5 tablets  Advised that is standard and she shouldn't need more than that  Advised she can also put cool compress on reddened area  Routing to provider for further advice

## 2020-03-30 NOTE — TELEPHONE ENCOUNTER
Agree with recommendations by RN  May be where suture knot is  Call if fevers or area worsens  Would try to alternate tylenol and motrin and use oxy only when needed  Hw often is she taking oxycodone? Can send in 6 more doses

## 2020-03-30 NOTE — TELEPHONE ENCOUNTER
[place], patient got 60 Percocet and 90 tramadone on 3/24, therefore I can not send more in  Agree with RN note and recommendations

## 2020-03-30 NOTE — TELEPHONE ENCOUNTER
Advised patient pain may be where suture knot is  Call with fevers or area worsens  Can alternate between motrin and tylenol, only use oxy when needed  She took one Oxycodone on Saturday evening and two yesterday  States she has two left but would like refill sent to pharmacy  Hx of chronic HTN took BP last night 143/104 took labetalol and increased fluids repeat /93  This morning /94 then took labetalol  Denies any h/a, visual changes, SOB, CP or palpations  States edema is better  Advised to monitor for now call with H/A not relieved by tylenol, visual changes, SOB, chest pain, palpations or increase in edema  Verbalized understanding  Routing to provider to order Oxycodone

## 2020-03-30 NOTE — TELEPHONE ENCOUNTER
Advised patient [place], states patient just received 60 Percocet and 90 tramadol on 3/24 therefore we cannot send in any more narcotics  Advised if she is out of oxycodone then if she is still in pain she can take Percocet here and there  States she has not used any percocet or tramadol at this time  It is for back pain and because she is not doing her typical routine she does not need  Will call back prior to using to see if safe with breast feeding  Advised to monitor incision, if she develops fever or area worsens to call the office  Verbalized understanding  Routing to provider to update

## 2020-03-31 ENCOUNTER — APPOINTMENT (OUTPATIENT)
Dept: PERINATAL CARE | Facility: CLINIC | Age: 40
End: 2020-03-31
Payer: COMMERCIAL

## 2020-03-31 ENCOUNTER — TELEPHONE (OUTPATIENT)
Dept: POSTPARTUM | Facility: CLINIC | Age: 40
End: 2020-03-31

## 2020-03-31 NOTE — TELEPHONE ENCOUNTER
Messi Ortez is feeding at the breast with an SNS and supplementing with formula  Her milk came in yesterday and she is pumping after feeding  I encouraged Yakelin to use expressed milk as supplement as available and to give formula as needed if expressed milk is not available  Messi Ortez feels [address] and nurses effectively and her breasts are comfortable after feeding  I encouraged Messi Ortez to continue with her current plan, call with concerns and follow up as scheduled

## 2020-03-31 NOTE — TELEPHONE ENCOUNTER
Dale Mcdaniels left message - DC on 3/28 with daughter Marlon Contreras now 11 days old - they went home with a supply of SNS - had 1st Ped appt today - baby is at 13% weight loss - Ped's has them supplementing with formula till up in weight a bit - mom was looking for additional SNS - was instructed to toss after 24hrs Temo Yeager spoke with pt to instruct how to clean so they can use the already open SNS for additional days)        Appt is set for 4/2

## 2020-04-01 ENCOUNTER — TELEPHONE (OUTPATIENT)
Dept: OBGYN CLINIC | Facility: CLINIC | Age: 40
End: 2020-04-01

## 2020-04-02 ENCOUNTER — TELEPHONE (OUTPATIENT)
Dept: POSTPARTUM | Facility: CLINIC | Age: 40
End: 2020-04-02

## 2020-04-02 LAB — PLACENTA IN STORAGE: NORMAL

## 2020-04-03 ENCOUNTER — TELEPHONE (OUTPATIENT)
Dept: OTHER | Facility: OTHER | Age: 40
End: 2020-04-03

## 2020-04-03 ENCOUNTER — HOSPITAL ENCOUNTER (OUTPATIENT)
Facility: HOSPITAL | Age: 40
Discharge: HOME | End: 2020-04-03
Attending: OBSTETRICS & GYNECOLOGY | Admitting: OBSTETRICS & GYNECOLOGY
Payer: COMMERCIAL

## 2020-04-03 VITALS
HEART RATE: 68 BPM | SYSTOLIC BLOOD PRESSURE: 147 MMHG | TEMPERATURE: 99.5 F | RESPIRATION RATE: 20 BRPM | DIASTOLIC BLOOD PRESSURE: 98 MMHG

## 2020-04-03 DIAGNOSIS — I10 ESSENTIAL HYPERTENSION: Primary | ICD-10-CM

## 2020-04-03 PROBLEM — R51.9 POSTPARTUM HEADACHE: Status: ACTIVE | Noted: 2020-04-03

## 2020-04-03 LAB
ALBUMIN SERPL BCP-MCNC: 2.9 G/DL (ref 3.5–5)
ALP SERPL-CCNC: 113 U/L (ref 46–116)
ALT SERPL W P-5'-P-CCNC: 33 U/L (ref 12–78)
ANION GAP SERPL CALCULATED.3IONS-SCNC: 10 MMOL/L (ref 4–13)
AST SERPL W P-5'-P-CCNC: 22 U/L (ref 5–45)
BACTERIA UR QL AUTO: ABNORMAL /HPF
BILIRUB SERPL-MCNC: 0.25 MG/DL (ref 0.2–1)
BILIRUB UR QL STRIP: NEGATIVE
BUN SERPL-MCNC: 16 MG/DL (ref 5–25)
CALCIUM SERPL-MCNC: 8.7 MG/DL (ref 8.3–10.1)
CHLORIDE SERPL-SCNC: 103 MMOL/L (ref 100–108)
CLARITY UR: CLEAR
CO2 SERPL-SCNC: 22 MMOL/L (ref 21–32)
COLOR UR: YELLOW
CREAT SERPL-MCNC: 0.6 MG/DL (ref 0.6–1.3)
CREAT UR-MCNC: <13 MG/DL
ERYTHROCYTE [DISTWIDTH] IN BLOOD BY AUTOMATED COUNT: 14 % (ref 11.6–15.1)
GFR SERPL CREATININE-BSD FRML MDRD: 116 ML/MIN/1.73SQ M
GLUCOSE P FAST SERPL-MCNC: 108 MG/DL (ref 65–99)
GLUCOSE SERPL-MCNC: 108 MG/DL (ref 65–140)
GLUCOSE UR STRIP-MCNC: NEGATIVE MG/DL
HCT VFR BLD AUTO: 38.5 % (ref 34.8–46.1)
HGB BLD-MCNC: 12.9 G/DL (ref 11.5–15.4)
HGB UR QL STRIP.AUTO: ABNORMAL
KETONES UR STRIP-MCNC: NEGATIVE MG/DL
LEUKOCYTE ESTERASE UR QL STRIP: NEGATIVE
MCH RBC QN AUTO: 33 PG (ref 26.8–34.3)
MCHC RBC AUTO-ENTMCNC: 33.5 G/DL (ref 31.4–37.4)
MCV RBC AUTO: 99 FL (ref 82–98)
MUCOUS THREADS UR QL AUTO: ABNORMAL
NITRITE UR QL STRIP: NEGATIVE
NON-SQ EPI CELLS URNS QL MICRO: ABNORMAL /HPF
PH UR STRIP.AUTO: 6 [PH]
PLATELET # BLD AUTO: 418 THOUSANDS/UL (ref 149–390)
PMV BLD AUTO: 8.5 FL (ref 8.9–12.7)
POTASSIUM SERPL-SCNC: 3.8 MMOL/L (ref 3.5–5.3)
PROT SERPL-MCNC: 7 G/DL (ref 6.4–8.2)
PROT UR STRIP-MCNC: NEGATIVE MG/DL
PROT UR-MCNC: <6 MG/DL
RBC # BLD AUTO: 3.91 MILLION/UL (ref 3.81–5.12)
RBC #/AREA URNS AUTO: ABNORMAL /HPF
SODIUM SERPL-SCNC: 135 MMOL/L (ref 136–145)
SP GR UR STRIP.AUTO: <=1.005 (ref 1–1.03)
UROBILINOGEN UR QL STRIP.AUTO: 0.2 E.U./DL
WBC # BLD AUTO: 6.87 THOUSAND/UL (ref 4.31–10.16)
WBC #/AREA URNS AUTO: ABNORMAL /HPF

## 2020-04-03 PROCEDURE — 81001 URINALYSIS AUTO W/SCOPE: CPT | Performed by: STUDENT IN AN ORGANIZED HEALTH CARE EDUCATION/TRAINING PROGRAM

## 2020-04-03 PROCEDURE — 99212 OFFICE O/P EST SF 10 MIN: CPT

## 2020-04-03 PROCEDURE — 84156 ASSAY OF PROTEIN URINE: CPT | Performed by: STUDENT IN AN ORGANIZED HEALTH CARE EDUCATION/TRAINING PROGRAM

## 2020-04-03 PROCEDURE — 82570 ASSAY OF URINE CREATININE: CPT | Performed by: STUDENT IN AN ORGANIZED HEALTH CARE EDUCATION/TRAINING PROGRAM

## 2020-04-03 PROCEDURE — 99213 OFFICE O/P EST LOW 20 MIN: CPT | Performed by: OBSTETRICS & GYNECOLOGY

## 2020-04-03 PROCEDURE — 80053 COMPREHEN METABOLIC PANEL: CPT | Performed by: STUDENT IN AN ORGANIZED HEALTH CARE EDUCATION/TRAINING PROGRAM

## 2020-04-03 PROCEDURE — 85027 COMPLETE CBC AUTOMATED: CPT | Performed by: STUDENT IN AN ORGANIZED HEALTH CARE EDUCATION/TRAINING PROGRAM

## 2020-04-03 RX ORDER — DIPHENHYDRAMINE HCL 25 MG
25 TABLET ORAL ONCE
Status: COMPLETED | OUTPATIENT
Start: 2020-04-03 | End: 2020-04-03

## 2020-04-03 RX ORDER — LABETALOL 200 MG/1
200 TABLET, FILM COATED ORAL ONCE
Status: COMPLETED | OUTPATIENT
Start: 2020-04-03 | End: 2020-04-03

## 2020-04-03 RX ORDER — ACETAMINOPHEN 325 MG/1
975 TABLET ORAL ONCE
Status: COMPLETED | OUTPATIENT
Start: 2020-04-03 | End: 2020-04-03

## 2020-04-03 RX ORDER — LABETALOL 100 MG/1
200 TABLET, FILM COATED ORAL 2 TIMES DAILY
Qty: 120 TABLET | Refills: 0 | Status: SHIPPED | OUTPATIENT
Start: 2020-04-03 | End: 2020-06-11

## 2020-04-03 RX ORDER — METOCLOPRAMIDE HYDROCHLORIDE 5 MG/ML
10 INJECTION INTRAMUSCULAR; INTRAVENOUS EVERY 20 MIN
Status: COMPLETED | OUTPATIENT
Start: 2020-04-03 | End: 2020-04-03

## 2020-04-03 RX ADMIN — LABETALOL HCL 200 MG: 200 TABLET, FILM COATED ORAL at 19:07

## 2020-04-03 RX ADMIN — DIPHENHYDRAMINE HCL 25 MG: 25 TABLET ORAL at 17:09

## 2020-04-03 RX ADMIN — METOCLOPRAMIDE HYDROCHLORIDE 10 MG: 5 INJECTION INTRAMUSCULAR; INTRAVENOUS at 17:43

## 2020-04-03 RX ADMIN — ACETAMINOPHEN 975 MG: 325 TABLET, FILM COATED ORAL at 17:08

## 2020-04-03 RX ADMIN — METOCLOPRAMIDE HYDROCHLORIDE 10 MG: 5 INJECTION INTRAMUSCULAR; INTRAVENOUS at 18:06

## 2020-04-04 ENCOUNTER — TELEPHONE (OUTPATIENT)
Dept: PERINATAL CARE | Facility: OTHER | Age: 40
End: 2020-04-04

## 2020-04-10 ENCOUNTER — OFFICE VISIT (OUTPATIENT)
Dept: OBGYN CLINIC | Facility: CLINIC | Age: 40
End: 2020-04-10
Payer: COMMERCIAL

## 2020-04-10 ENCOUNTER — TELEPHONE (OUTPATIENT)
Dept: OBGYN CLINIC | Facility: CLINIC | Age: 40
End: 2020-04-10

## 2020-04-10 VITALS
SYSTOLIC BLOOD PRESSURE: 152 MMHG | WEIGHT: 148 LBS | DIASTOLIC BLOOD PRESSURE: 102 MMHG | TEMPERATURE: 98.8 F | BODY MASS INDEX: 26.22 KG/M2

## 2020-04-10 DIAGNOSIS — O10.919 CHRONIC HYPERTENSION AFFECTING PREGNANCY: Primary | ICD-10-CM

## 2020-04-10 PROCEDURE — 1111F DSCHRG MED/CURRENT MED MERGE: CPT | Performed by: OBSTETRICS & GYNECOLOGY

## 2020-04-10 PROCEDURE — 3080F DIAST BP >= 90 MM HG: CPT | Performed by: OBSTETRICS & GYNECOLOGY

## 2020-04-10 PROCEDURE — 1036F TOBACCO NON-USER: CPT | Performed by: OBSTETRICS & GYNECOLOGY

## 2020-04-10 PROCEDURE — 99213 OFFICE O/P EST LOW 20 MIN: CPT | Performed by: OBSTETRICS & GYNECOLOGY

## 2020-04-10 PROCEDURE — 3077F SYST BP >= 140 MM HG: CPT | Performed by: OBSTETRICS & GYNECOLOGY

## 2020-04-10 RX ORDER — NIFEDIPINE 30 MG/1
30 TABLET, EXTENDED RELEASE ORAL DAILY
Qty: 30 TABLET | Refills: 0 | Status: SHIPPED | OUTPATIENT
Start: 2020-04-10 | End: 2020-05-04 | Stop reason: ALTCHOICE

## 2020-04-16 ENCOUNTER — TELEMEDICINE (OUTPATIENT)
Dept: OBGYN CLINIC | Facility: CLINIC | Age: 40
End: 2020-04-16

## 2020-04-16 VITALS — HEIGHT: 63 IN | BODY MASS INDEX: 26.22 KG/M2 | WEIGHT: 148 LBS

## 2020-04-16 DIAGNOSIS — I10 ESSENTIAL HYPERTENSION: ICD-10-CM

## 2020-04-16 DIAGNOSIS — Z98.891 S/P REPEAT LOW TRANSVERSE C-SECTION: Primary | ICD-10-CM

## 2020-04-16 PROBLEM — O10.919 CHRONIC HYPERTENSION AFFECTING PREGNANCY: Status: RESOLVED | Noted: 2019-12-05 | Resolved: 2020-04-16

## 2020-04-16 PROBLEM — Z3A.37 37 WEEKS GESTATION OF PREGNANCY: Status: RESOLVED | Noted: 2020-01-21 | Resolved: 2020-04-16

## 2020-04-16 PROBLEM — O09.529 ANTEPARTUM MULTIGRAVIDA OF ADVANCED MATERNAL AGE: Status: RESOLVED | Noted: 2019-09-10 | Resolved: 2020-04-16

## 2020-04-16 PROBLEM — R51.9 POSTPARTUM HEADACHE: Status: RESOLVED | Noted: 2020-04-03 | Resolved: 2020-04-16

## 2020-04-16 PROBLEM — Z20.828 PARVOVIRUS EXPOSURE: Status: RESOLVED | Noted: 2019-10-09 | Resolved: 2020-04-16

## 2020-04-16 PROBLEM — R51.9 PREGNANCY HEADACHE, ANTEPARTUM: Status: RESOLVED | Noted: 2019-12-05 | Resolved: 2020-04-16

## 2020-04-16 PROBLEM — O09.90 HIGH RISK PREGNANCY, ANTEPARTUM: Status: RESOLVED | Noted: 2019-12-05 | Resolved: 2020-04-16

## 2020-04-16 PROBLEM — O34.219 PREVIOUS CESAREAN SECTION COMPLICATING PREGNANCY: Status: RESOLVED | Noted: 2020-03-26 | Resolved: 2020-04-16

## 2020-04-16 PROBLEM — O99.013 ANEMIA DURING PREGNANCY IN THIRD TRIMESTER: Status: RESOLVED | Noted: 2019-11-07 | Resolved: 2020-04-16

## 2020-04-16 PROBLEM — O26.899 PREGNANCY HEADACHE, ANTEPARTUM: Status: RESOLVED | Noted: 2019-12-05 | Resolved: 2020-04-16

## 2020-04-16 PROCEDURE — 99024 POSTOP FOLLOW-UP VISIT: CPT | Performed by: OBSTETRICS & GYNECOLOGY

## 2020-04-20 ENCOUNTER — TELEPHONE (OUTPATIENT)
Dept: FAMILY MEDICINE CLINIC | Facility: CLINIC | Age: 40
End: 2020-04-20

## 2020-04-20 DIAGNOSIS — M54.6 CHRONIC RIGHT-SIDED THORACIC BACK PAIN: Primary | ICD-10-CM

## 2020-04-20 DIAGNOSIS — G89.29 CHRONIC RIGHT-SIDED THORACIC BACK PAIN: Primary | ICD-10-CM

## 2020-04-20 DIAGNOSIS — M54.2 CERVICALGIA: ICD-10-CM

## 2020-04-20 DIAGNOSIS — E55.9 VITAMIN D DEFICIENCY: ICD-10-CM

## 2020-04-20 RX ORDER — ERGOCALCIFEROL 1.25 MG/1
CAPSULE ORAL
Qty: 8 CAPSULE | Refills: 0 | Status: SHIPPED | OUTPATIENT
Start: 2020-04-20 | End: 2020-05-14 | Stop reason: SDUPTHER

## 2020-04-20 RX ORDER — TRAMADOL HYDROCHLORIDE 50 MG/1
TABLET ORAL
Qty: 90 TABLET | Refills: 2 | Status: SHIPPED | OUTPATIENT
Start: 2020-04-20 | End: 2020-07-08 | Stop reason: SDUPTHER

## 2020-04-21 RX ORDER — OXYCODONE HYDROCHLORIDE AND ACETAMINOPHEN 5; 325 MG/1; MG/1
1 TABLET ORAL EVERY 4 HOURS PRN
Qty: 60 TABLET | Refills: 0 | Status: SHIPPED | OUTPATIENT
Start: 2020-04-21 | End: 2020-05-14 | Stop reason: SDUPTHER

## 2020-05-04 ENCOUNTER — TELEMEDICINE (OUTPATIENT)
Dept: POSTPARTUM | Facility: CLINIC | Age: 40
End: 2020-05-04
Payer: COMMERCIAL

## 2020-05-04 DIAGNOSIS — Z71.89 ENCOUNTER FOR BREAST FEEDING COUNSELING: Primary | ICD-10-CM

## 2020-05-04 DIAGNOSIS — O92.70 LACTATION PROBLEM: ICD-10-CM

## 2020-05-04 PROCEDURE — 99404 PREV MED CNSL INDIV APPRX 60: CPT | Performed by: PEDIATRICS

## 2020-05-08 ENCOUNTER — OFFICE VISIT (OUTPATIENT)
Dept: POSTPARTUM | Facility: CLINIC | Age: 40
End: 2020-05-08
Payer: COMMERCIAL

## 2020-05-08 DIAGNOSIS — Z71.89 ENCOUNTER FOR BREAST FEEDING COUNSELING: Primary | ICD-10-CM

## 2020-05-08 PROCEDURE — 99404 PREV MED CNSL INDIV APPRX 60: CPT | Performed by: PEDIATRICS

## 2020-05-11 DIAGNOSIS — F51.01 PRIMARY INSOMNIA: ICD-10-CM

## 2020-05-12 RX ORDER — ZOLPIDEM TARTRATE 5 MG/1
TABLET ORAL
Qty: 60 TABLET | Refills: 2 | Status: SHIPPED | OUTPATIENT
Start: 2020-05-12 | End: 2020-10-29 | Stop reason: SDUPTHER

## 2020-05-14 DIAGNOSIS — E55.9 VITAMIN D DEFICIENCY: ICD-10-CM

## 2020-05-14 DIAGNOSIS — M54.6 CHRONIC RIGHT-SIDED THORACIC BACK PAIN: ICD-10-CM

## 2020-05-14 DIAGNOSIS — G89.29 CHRONIC RIGHT-SIDED THORACIC BACK PAIN: ICD-10-CM

## 2020-05-15 ENCOUNTER — TELEPHONE (OUTPATIENT)
Dept: OBGYN CLINIC | Facility: CLINIC | Age: 40
End: 2020-05-15

## 2020-05-15 DIAGNOSIS — G44.221 CHRONIC TENSION-TYPE HEADACHE, INTRACTABLE: Primary | ICD-10-CM

## 2020-05-15 RX ORDER — BUTALBITAL, ACETAMINOPHEN AND CAFFEINE 50; 325; 40 MG/1; MG/1; MG/1
1 TABLET ORAL EVERY 4 HOURS PRN
Qty: 5 TABLET | Refills: 0 | Status: SHIPPED | OUTPATIENT
Start: 2020-05-15 | End: 2022-05-12 | Stop reason: ALTCHOICE

## 2020-05-18 RX ORDER — ERGOCALCIFEROL 1.25 MG/1
50000 CAPSULE ORAL WEEKLY
Qty: 8 CAPSULE | Refills: 0 | Status: SHIPPED | OUTPATIENT
Start: 2020-05-18 | End: 2020-06-10

## 2020-05-18 RX ORDER — OXYCODONE HYDROCHLORIDE AND ACETAMINOPHEN 5; 325 MG/1; MG/1
1 TABLET ORAL EVERY 4 HOURS PRN
Qty: 60 TABLET | Refills: 0 | Status: SHIPPED | OUTPATIENT
Start: 2020-05-18 | End: 2020-06-06 | Stop reason: SDUPTHER

## 2020-06-03 ENCOUNTER — OFFICE VISIT (OUTPATIENT)
Dept: FAMILY MEDICINE CLINIC | Facility: CLINIC | Age: 40
End: 2020-06-03
Payer: COMMERCIAL

## 2020-06-03 VITALS
TEMPERATURE: 97.7 F | HEART RATE: 82 BPM | HEIGHT: 63 IN | BODY MASS INDEX: 26.05 KG/M2 | OXYGEN SATURATION: 99 % | SYSTOLIC BLOOD PRESSURE: 100 MMHG | WEIGHT: 147 LBS | DIASTOLIC BLOOD PRESSURE: 74 MMHG | RESPIRATION RATE: 18 BRPM

## 2020-06-03 DIAGNOSIS — G89.29 CHRONIC BILATERAL THORACIC BACK PAIN: ICD-10-CM

## 2020-06-03 DIAGNOSIS — M54.6 CHRONIC BILATERAL THORACIC BACK PAIN: ICD-10-CM

## 2020-06-03 DIAGNOSIS — I10 ESSENTIAL HYPERTENSION: Primary | ICD-10-CM

## 2020-06-03 PROCEDURE — 99213 OFFICE O/P EST LOW 20 MIN: CPT | Performed by: FAMILY MEDICINE

## 2020-06-03 PROCEDURE — 3074F SYST BP LT 130 MM HG: CPT | Performed by: FAMILY MEDICINE

## 2020-06-03 PROCEDURE — 3078F DIAST BP <80 MM HG: CPT | Performed by: FAMILY MEDICINE

## 2020-06-03 PROCEDURE — 1036F TOBACCO NON-USER: CPT | Performed by: FAMILY MEDICINE

## 2020-06-03 RX ORDER — LABETALOL 200 MG/1
TABLET, FILM COATED ORAL
COMMUNITY
Start: 2020-05-15 | End: 2020-12-07 | Stop reason: SDUPTHER

## 2020-06-06 DIAGNOSIS — G89.29 CHRONIC RIGHT-SIDED THORACIC BACK PAIN: ICD-10-CM

## 2020-06-06 DIAGNOSIS — I10 ESSENTIAL HYPERTENSION: Primary | ICD-10-CM

## 2020-06-06 DIAGNOSIS — M54.6 CHRONIC RIGHT-SIDED THORACIC BACK PAIN: ICD-10-CM

## 2020-06-08 DIAGNOSIS — I10 ESSENTIAL HYPERTENSION: ICD-10-CM

## 2020-06-10 ENCOUNTER — OFFICE VISIT (OUTPATIENT)
Dept: FAMILY MEDICINE CLINIC | Facility: CLINIC | Age: 40
End: 2020-06-10
Payer: COMMERCIAL

## 2020-06-10 VITALS
TEMPERATURE: 97.7 F | DIASTOLIC BLOOD PRESSURE: 74 MMHG | HEIGHT: 62 IN | RESPIRATION RATE: 16 BRPM | BODY MASS INDEX: 26.68 KG/M2 | WEIGHT: 145 LBS | SYSTOLIC BLOOD PRESSURE: 104 MMHG

## 2020-06-10 DIAGNOSIS — R30.0 DYSURIA: ICD-10-CM

## 2020-06-10 DIAGNOSIS — E55.9 VITAMIN D DEFICIENCY: Primary | ICD-10-CM

## 2020-06-10 DIAGNOSIS — Z00.00 PHYSICAL EXAM: ICD-10-CM

## 2020-06-10 DIAGNOSIS — E55.9 VITAMIN D DEFICIENCY: ICD-10-CM

## 2020-06-10 DIAGNOSIS — F11.20 OPIOID TYPE DEPENDENCE, CONTINUOUS (HCC): ICD-10-CM

## 2020-06-10 LAB
SL AMB  POCT GLUCOSE, UA: NORMAL
SL AMB LEUKOCYTE ESTERASE,UA: NORMAL
SL AMB POCT BILIRUBIN,UA: NORMAL
SL AMB POCT BLOOD,UA: NORMAL
SL AMB POCT CLARITY,UA: CLEAR
SL AMB POCT COLOR,UA: YELLOW
SL AMB POCT KETONES,UA: NORMAL
SL AMB POCT NITRITE,UA: NORMAL
SL AMB POCT PH,UA: 5
SL AMB POCT SPECIFIC GRAVITY,UA: 1.02
SL AMB POCT URINE PROTEIN: NORMAL
SL AMB POCT UROBILINOGEN: NORMAL

## 2020-06-10 PROCEDURE — 3008F BODY MASS INDEX DOCD: CPT | Performed by: FAMILY MEDICINE

## 2020-06-10 PROCEDURE — 99395 PREV VISIT EST AGE 18-39: CPT | Performed by: FAMILY MEDICINE

## 2020-06-10 PROCEDURE — 81003 URINALYSIS AUTO W/O SCOPE: CPT | Performed by: FAMILY MEDICINE

## 2020-06-10 RX ORDER — LISINOPRIL 10 MG/1
TABLET ORAL
COMMUNITY
Start: 2020-05-14

## 2020-06-10 RX ORDER — LISINOPRIL 20 MG/1
20 TABLET ORAL DAILY
Qty: 90 TABLET | Refills: 0 | Status: CANCELLED | OUTPATIENT
Start: 2020-06-10

## 2020-06-10 RX ORDER — ERGOCALCIFEROL 1.25 MG/1
CAPSULE ORAL
Qty: 8 CAPSULE | Refills: 0 | Status: SHIPPED | OUTPATIENT
Start: 2020-06-10 | End: 2020-08-31

## 2020-06-11 DIAGNOSIS — L70.0 ACNE VULGARIS: ICD-10-CM

## 2020-06-11 LAB — SL AMB QUESTION: NORMAL

## 2020-06-11 RX ORDER — LABETALOL 100 MG/1
TABLET, FILM COATED ORAL
Qty: 120 TABLET | Refills: 0 | Status: SHIPPED | OUTPATIENT
Start: 2020-06-11 | End: 2020-08-09 | Stop reason: SDUPTHER

## 2020-06-11 RX ORDER — LISINOPRIL 10 MG/1
TABLET ORAL
Qty: 90 TABLET | Refills: 0 | Status: SHIPPED | OUTPATIENT
Start: 2020-06-11 | End: 2020-07-09 | Stop reason: SDUPTHER

## 2020-06-11 RX ORDER — LISINOPRIL 10 MG/1
10 TABLET ORAL 2 TIMES DAILY
Qty: 120 TABLET | Refills: 3 | Status: SHIPPED | OUTPATIENT
Start: 2020-06-11 | End: 2020-12-07 | Stop reason: SDUPTHER

## 2020-06-12 DIAGNOSIS — M54.6 CHRONIC RIGHT-SIDED THORACIC BACK PAIN: ICD-10-CM

## 2020-06-12 DIAGNOSIS — G89.29 CHRONIC RIGHT-SIDED THORACIC BACK PAIN: ICD-10-CM

## 2020-06-12 RX ORDER — DAPSONE 50 MG/G
GEL TOPICAL
Qty: 90 G | Refills: 0 | Status: SHIPPED | OUTPATIENT
Start: 2020-06-12 | End: 2020-07-09 | Stop reason: SDUPTHER

## 2020-06-12 RX ORDER — OXYCODONE HYDROCHLORIDE AND ACETAMINOPHEN 5; 325 MG/1; MG/1
1 TABLET ORAL EVERY 4 HOURS PRN
Qty: 60 TABLET | Refills: 0 | OUTPATIENT
Start: 2020-06-12

## 2020-06-12 RX ORDER — OXYCODONE HYDROCHLORIDE AND ACETAMINOPHEN 5; 325 MG/1; MG/1
1 TABLET ORAL EVERY 8 HOURS PRN
Qty: 90 TABLET | Refills: 0 | Status: SHIPPED | OUTPATIENT
Start: 2020-06-12 | End: 2020-07-09 | Stop reason: SDUPTHER

## 2020-06-19 LAB
6MAM UR QL: NEGATIVE NG/ML
AMPHETAMINES UR QL: NEGATIVE NG/ML
BARBITURATES UR QL: NEGATIVE NG/ML
BENZODIAZ UR QL: NEGATIVE NG/ML
BENZODIAZ UR SCN-MCNC: NORMAL NG/ML
BZE UR QL: NEGATIVE NG/ML
CREAT UR-MCNC: 144.1 MG/DL
ETHANOL UR QL: NEGATIVE NG/ML
MEDICATION PRESCRIBED: NORMAL
METHADONE UR QL: NEGATIVE NG/ML
OPIATES UR QL: NEGATIVE NG/ML
OXIDANTS UR QL: NEGATIVE MCG/ML
OXYCODONE UR QL: NEGATIVE NG/ML
PCP UR QL: NEGATIVE NG/ML
PH UR: 6.23 [PH] (ref 4.5–9)
SL AMB MEDMATCH 6 ACETYLMORPHINE: NORMAL
SL AMB MEDMATCH ALCOHOL METAB: NORMAL
SL AMB MEDMATCH AMPTHETAMINES: NORMAL
SL AMB MEDMATCH BARBITUATES: NORMAL
SL AMB MEDMATCH COCAINE METABOLITE: NORMAL
SL AMB MEDMATCH MARIJUANA METABOLITE: NORMAL
SL AMB MEDMATCH METHADONE METABOLITE: NORMAL
SL AMB MEDMATCH OPIATES: NORMAL
SL AMB MEDMATCH OXYCODONE: NORMAL
SL AMB MEDMATCH PHENCYCLIDINE: NORMAL
THC UR QL: NEGATIVE NG/ML

## 2020-06-29 DIAGNOSIS — G89.29 CHRONIC BILATERAL LOW BACK PAIN WITHOUT SCIATICA: ICD-10-CM

## 2020-06-29 DIAGNOSIS — M54.50 CHRONIC BILATERAL LOW BACK PAIN WITHOUT SCIATICA: ICD-10-CM

## 2020-06-30 RX ORDER — NAPROXEN 500 MG/1
TABLET ORAL
Qty: 60 TABLET | Refills: 5 | Status: SHIPPED | OUTPATIENT
Start: 2020-06-30 | End: 2020-12-12 | Stop reason: SDUPTHER

## 2020-07-07 DIAGNOSIS — F41.0 PANIC ATTACKS: Primary | ICD-10-CM

## 2020-07-07 RX ORDER — HYDROXYZINE HYDROCHLORIDE 25 MG/1
25 TABLET, FILM COATED ORAL
Qty: 30 TABLET | Refills: 6 | Status: SHIPPED | OUTPATIENT
Start: 2020-07-07 | End: 2021-01-07

## 2020-07-08 DIAGNOSIS — M54.2 CERVICALGIA: ICD-10-CM

## 2020-07-09 DIAGNOSIS — G89.29 CHRONIC RIGHT-SIDED THORACIC BACK PAIN: ICD-10-CM

## 2020-07-09 DIAGNOSIS — L70.0 ACNE VULGARIS: ICD-10-CM

## 2020-07-09 DIAGNOSIS — M54.6 CHRONIC RIGHT-SIDED THORACIC BACK PAIN: ICD-10-CM

## 2020-07-09 DIAGNOSIS — I10 ESSENTIAL HYPERTENSION: ICD-10-CM

## 2020-07-09 RX ORDER — TRAMADOL HYDROCHLORIDE 50 MG/1
50 TABLET ORAL EVERY 8 HOURS PRN
Qty: 90 TABLET | Refills: 0 | Status: SHIPPED | OUTPATIENT
Start: 2020-07-09 | End: 2020-08-05 | Stop reason: SDUPTHER

## 2020-07-09 RX ORDER — DAPSONE 50 MG/G
GEL TOPICAL
Qty: 90 G | Refills: 6 | Status: SHIPPED | OUTPATIENT
Start: 2020-07-09 | End: 2020-08-06 | Stop reason: SDUPTHER

## 2020-07-09 RX ORDER — LISINOPRIL 10 MG/1
10 TABLET ORAL DAILY
Qty: 90 TABLET | Refills: 3 | Status: SHIPPED | OUTPATIENT
Start: 2020-07-09 | End: 2020-08-05 | Stop reason: SDUPTHER

## 2020-07-09 RX ORDER — OXYCODONE HYDROCHLORIDE AND ACETAMINOPHEN 5; 325 MG/1; MG/1
1 TABLET ORAL EVERY 8 HOURS PRN
Qty: 90 TABLET | Refills: 0 | Status: SHIPPED | OUTPATIENT
Start: 2020-07-09 | End: 2020-08-06 | Stop reason: SDUPTHER

## 2020-07-16 NOTE — PLAN OF CARE
Problem: Knowledge Deficit  Goal: Patient/family/caregiver demonstrates understanding of disease process, treatment plan, medications, and discharge instructions  Description  Complete learning assessment and assess knowledge base    Interventions:  - Provide teaching at level of understanding  - Provide teaching via preferred learning methods  Outcome: Progressing
Detail Level: Simple
Quality 130: Documentation Of Current Medications In The Medical Record: Current Medications Documented

## 2020-07-29 DIAGNOSIS — I10 ESSENTIAL HYPERTENSION: ICD-10-CM

## 2020-07-30 DIAGNOSIS — I10 ESSENTIAL HYPERTENSION: ICD-10-CM

## 2020-08-05 DIAGNOSIS — M54.2 CERVICALGIA: ICD-10-CM

## 2020-08-05 DIAGNOSIS — I10 ESSENTIAL HYPERTENSION: ICD-10-CM

## 2020-08-06 DIAGNOSIS — G89.29 CHRONIC RIGHT-SIDED THORACIC BACK PAIN: ICD-10-CM

## 2020-08-06 DIAGNOSIS — L70.0 ACNE VULGARIS: ICD-10-CM

## 2020-08-06 DIAGNOSIS — M54.6 CHRONIC RIGHT-SIDED THORACIC BACK PAIN: ICD-10-CM

## 2020-08-06 RX ORDER — DAPSONE 50 MG/G
GEL TOPICAL
Qty: 90 G | Refills: 0 | Status: SHIPPED | OUTPATIENT
Start: 2020-08-06 | End: 2020-09-20 | Stop reason: SDUPTHER

## 2020-08-06 RX ORDER — OXYCODONE HYDROCHLORIDE AND ACETAMINOPHEN 5; 325 MG/1; MG/1
1 TABLET ORAL EVERY 8 HOURS PRN
Qty: 90 TABLET | Refills: 0 | Status: SHIPPED | OUTPATIENT
Start: 2020-08-06 | End: 2020-09-03 | Stop reason: SDUPTHER

## 2020-08-06 RX ORDER — LISINOPRIL 10 MG/1
10 TABLET ORAL DAILY
Qty: 90 TABLET | Refills: 3 | Status: SHIPPED | OUTPATIENT
Start: 2020-08-06 | End: 2021-03-26 | Stop reason: SDUPTHER

## 2020-08-06 RX ORDER — TRAMADOL HYDROCHLORIDE 50 MG/1
50 TABLET ORAL EVERY 8 HOURS PRN
Qty: 90 TABLET | Refills: 0 | Status: SHIPPED | OUTPATIENT
Start: 2020-08-06 | End: 2020-09-03

## 2020-08-06 RX ORDER — TRAMADOL HYDROCHLORIDE 50 MG/1
TABLET ORAL
Qty: 90 TABLET | OUTPATIENT
Start: 2020-08-06

## 2020-08-07 DIAGNOSIS — I10 ESSENTIAL HYPERTENSION: ICD-10-CM

## 2020-08-09 RX ORDER — LABETALOL 100 MG/1
100 TABLET, FILM COATED ORAL 2 TIMES DAILY
Qty: 60 TABLET | Refills: 2 | Status: SHIPPED | OUTPATIENT
Start: 2020-08-09 | End: 2020-11-17 | Stop reason: SDUPTHER

## 2020-08-24 DIAGNOSIS — E55.9 VITAMIN D DEFICIENCY: ICD-10-CM

## 2020-08-31 RX ORDER — ERGOCALCIFEROL 1.25 MG/1
CAPSULE ORAL
Qty: 8 CAPSULE | Refills: 0 | Status: SHIPPED | OUTPATIENT
Start: 2020-08-31 | End: 2020-10-18 | Stop reason: SDUPTHER

## 2020-09-02 DIAGNOSIS — M54.2 CERVICALGIA: ICD-10-CM

## 2020-09-03 DIAGNOSIS — G89.29 CHRONIC RIGHT-SIDED THORACIC BACK PAIN: ICD-10-CM

## 2020-09-03 DIAGNOSIS — M54.6 CHRONIC RIGHT-SIDED THORACIC BACK PAIN: ICD-10-CM

## 2020-09-03 RX ORDER — OXYCODONE HYDROCHLORIDE AND ACETAMINOPHEN 5; 325 MG/1; MG/1
1 TABLET ORAL EVERY 8 HOURS PRN
Qty: 90 TABLET | Refills: 0 | Status: SHIPPED | OUTPATIENT
Start: 2020-09-03 | End: 2020-10-01 | Stop reason: SDUPTHER

## 2020-09-03 RX ORDER — TRAMADOL HYDROCHLORIDE 50 MG/1
TABLET ORAL
Qty: 90 TABLET | Refills: 2 | Status: SHIPPED | OUTPATIENT
Start: 2020-09-03 | End: 2020-11-27 | Stop reason: SDUPTHER

## 2020-09-19 DIAGNOSIS — L70.0 ACNE VULGARIS: ICD-10-CM

## 2020-09-20 RX ORDER — DAPSONE 50 MG/G
GEL TOPICAL
Qty: 90 G | Refills: 0 | Status: SHIPPED | OUTPATIENT
Start: 2020-09-20 | End: 2020-10-15

## 2020-10-01 DIAGNOSIS — G89.29 CHRONIC RIGHT-SIDED THORACIC BACK PAIN: ICD-10-CM

## 2020-10-01 DIAGNOSIS — M54.6 CHRONIC RIGHT-SIDED THORACIC BACK PAIN: ICD-10-CM

## 2020-10-01 RX ORDER — OXYCODONE HYDROCHLORIDE AND ACETAMINOPHEN 5; 325 MG/1; MG/1
1 TABLET ORAL EVERY 8 HOURS PRN
Qty: 90 TABLET | Refills: 0 | Status: SHIPPED | OUTPATIENT
Start: 2020-10-01 | End: 2020-10-29 | Stop reason: SDUPTHER

## 2020-10-15 DIAGNOSIS — L70.0 ACNE VULGARIS: ICD-10-CM

## 2020-10-15 RX ORDER — DAPSONE 50 MG/G
GEL TOPICAL
Qty: 90 G | Refills: 5 | Status: SHIPPED | OUTPATIENT
Start: 2020-10-15 | End: 2021-04-30 | Stop reason: SDUPTHER

## 2020-10-18 DIAGNOSIS — E55.9 VITAMIN D DEFICIENCY: ICD-10-CM

## 2020-10-18 RX ORDER — ERGOCALCIFEROL 1.25 MG/1
CAPSULE ORAL
Qty: 8 CAPSULE | Refills: 0 | Status: SHIPPED | OUTPATIENT
Start: 2020-10-18 | End: 2020-12-07 | Stop reason: SDUPTHER

## 2020-10-29 DIAGNOSIS — F51.01 PRIMARY INSOMNIA: ICD-10-CM

## 2020-10-29 DIAGNOSIS — M54.6 CHRONIC RIGHT-SIDED THORACIC BACK PAIN: ICD-10-CM

## 2020-10-29 DIAGNOSIS — G89.29 CHRONIC RIGHT-SIDED THORACIC BACK PAIN: ICD-10-CM

## 2020-10-29 RX ORDER — OXYCODONE HYDROCHLORIDE AND ACETAMINOPHEN 5; 325 MG/1; MG/1
1 TABLET ORAL EVERY 8 HOURS PRN
Qty: 90 TABLET | Refills: 0 | Status: SHIPPED | OUTPATIENT
Start: 2020-10-29 | End: 2020-10-29 | Stop reason: SDUPTHER

## 2020-10-29 RX ORDER — ZOLPIDEM TARTRATE 5 MG/1
5 TABLET ORAL
Qty: 60 TABLET | Refills: 0 | Status: SHIPPED | OUTPATIENT
Start: 2020-10-29 | End: 2020-12-07 | Stop reason: SDUPTHER

## 2020-10-29 RX ORDER — OXYCODONE HYDROCHLORIDE AND ACETAMINOPHEN 5; 325 MG/1; MG/1
1 TABLET ORAL EVERY 8 HOURS PRN
Qty: 90 TABLET | Refills: 0 | Status: SHIPPED | OUTPATIENT
Start: 2020-10-29 | End: 2020-11-27

## 2020-11-17 DIAGNOSIS — I10 ESSENTIAL HYPERTENSION: ICD-10-CM

## 2020-11-17 RX ORDER — LABETALOL 100 MG/1
100 TABLET, FILM COATED ORAL 2 TIMES DAILY
Qty: 60 TABLET | Refills: 2 | Status: SHIPPED | OUTPATIENT
Start: 2020-11-17 | End: 2021-02-15

## 2020-11-25 DIAGNOSIS — M54.2 CERVICALGIA: ICD-10-CM

## 2020-11-27 DIAGNOSIS — G89.29 CHRONIC RIGHT-SIDED THORACIC BACK PAIN: ICD-10-CM

## 2020-11-27 DIAGNOSIS — M54.2 CERVICALGIA: ICD-10-CM

## 2020-11-27 DIAGNOSIS — M54.6 CHRONIC RIGHT-SIDED THORACIC BACK PAIN: ICD-10-CM

## 2020-11-27 RX ORDER — OXYCODONE HYDROCHLORIDE AND ACETAMINOPHEN 5; 325 MG/1; MG/1
1 TABLET ORAL EVERY 8 HOURS PRN
Qty: 90 TABLET | Refills: 0 | Status: CANCELLED | OUTPATIENT
Start: 2020-11-27

## 2020-11-27 RX ORDER — TRAMADOL HYDROCHLORIDE 50 MG/1
TABLET ORAL
Qty: 21 TABLET | Refills: 0 | Status: SHIPPED | OUTPATIENT
Start: 2020-11-27 | End: 2020-12-07 | Stop reason: SDUPTHER

## 2020-11-27 RX ORDER — OXYCODONE HYDROCHLORIDE AND ACETAMINOPHEN 5; 325 MG/1; MG/1
1 TABLET ORAL EVERY 8 HOURS PRN
Qty: 21 TABLET | Refills: 0 | Status: SHIPPED | OUTPATIENT
Start: 2020-11-29 | End: 2020-12-07 | Stop reason: SDUPTHER

## 2020-11-27 RX ORDER — TRAMADOL HYDROCHLORIDE 50 MG/1
TABLET ORAL
Qty: 90 TABLET | Refills: 0 | OUTPATIENT
Start: 2020-11-27

## 2020-11-28 RX ORDER — TRAMADOL HYDROCHLORIDE 50 MG/1
TABLET ORAL
Qty: 90 TABLET | OUTPATIENT
Start: 2020-11-28

## 2020-12-07 ENCOUNTER — OFFICE VISIT (OUTPATIENT)
Dept: FAMILY MEDICINE CLINIC | Facility: CLINIC | Age: 40
End: 2020-12-07
Payer: COMMERCIAL

## 2020-12-07 VITALS
TEMPERATURE: 98.8 F | HEIGHT: 63 IN | BODY MASS INDEX: 24.36 KG/M2 | WEIGHT: 137.5 LBS | OXYGEN SATURATION: 98 % | SYSTOLIC BLOOD PRESSURE: 110 MMHG | HEART RATE: 99 BPM | DIASTOLIC BLOOD PRESSURE: 70 MMHG | RESPIRATION RATE: 16 BRPM

## 2020-12-07 DIAGNOSIS — Z23 ENCOUNTER FOR IMMUNIZATION: ICD-10-CM

## 2020-12-07 DIAGNOSIS — K21.00 GASTROESOPHAGEAL REFLUX DISEASE WITH ESOPHAGITIS WITHOUT HEMORRHAGE: ICD-10-CM

## 2020-12-07 DIAGNOSIS — R61 CHRONIC NIGHT SWEATS: ICD-10-CM

## 2020-12-07 DIAGNOSIS — F51.01 PRIMARY INSOMNIA: ICD-10-CM

## 2020-12-07 DIAGNOSIS — E55.9 VITAMIN D DEFICIENCY: ICD-10-CM

## 2020-12-07 DIAGNOSIS — F41.0 PANIC ATTACKS: Primary | ICD-10-CM

## 2020-12-07 DIAGNOSIS — I10 ESSENTIAL HYPERTENSION: ICD-10-CM

## 2020-12-07 DIAGNOSIS — M54.2 CERVICALGIA: ICD-10-CM

## 2020-12-07 DIAGNOSIS — M54.6 CHRONIC RIGHT-SIDED THORACIC BACK PAIN: ICD-10-CM

## 2020-12-07 DIAGNOSIS — G89.29 CHRONIC RIGHT-SIDED THORACIC BACK PAIN: ICD-10-CM

## 2020-12-07 DIAGNOSIS — F41.1 GENERALIZED ANXIETY DISORDER: ICD-10-CM

## 2020-12-07 PROCEDURE — 3074F SYST BP LT 130 MM HG: CPT | Performed by: FAMILY MEDICINE

## 2020-12-07 PROCEDURE — 3008F BODY MASS INDEX DOCD: CPT | Performed by: FAMILY MEDICINE

## 2020-12-07 PROCEDURE — 3078F DIAST BP <80 MM HG: CPT | Performed by: FAMILY MEDICINE

## 2020-12-07 PROCEDURE — 99214 OFFICE O/P EST MOD 30 MIN: CPT | Performed by: FAMILY MEDICINE

## 2020-12-07 PROCEDURE — 1036F TOBACCO NON-USER: CPT | Performed by: FAMILY MEDICINE

## 2020-12-07 RX ORDER — LISINOPRIL 10 MG/1
10 TABLET ORAL 2 TIMES DAILY
Qty: 120 TABLET | Refills: 3 | Status: SHIPPED | OUTPATIENT
Start: 2020-12-07 | End: 2022-05-28 | Stop reason: SDUPTHER

## 2020-12-07 RX ORDER — ZOLPIDEM TARTRATE 5 MG/1
5 TABLET ORAL
Qty: 60 TABLET | Refills: 0 | Status: SHIPPED | OUTPATIENT
Start: 2020-12-07 | End: 2021-02-11

## 2020-12-07 RX ORDER — OXYCODONE HYDROCHLORIDE AND ACETAMINOPHEN 5; 325 MG/1; MG/1
1 TABLET ORAL EVERY 8 HOURS PRN
Qty: 21 TABLET | Refills: 0 | Status: SHIPPED | OUTPATIENT
Start: 2020-12-07 | End: 2020-12-15 | Stop reason: SDUPTHER

## 2020-12-07 RX ORDER — ERGOCALCIFEROL 1.25 MG/1
50000 CAPSULE ORAL WEEKLY
Qty: 8 CAPSULE | Refills: 0 | Status: SHIPPED | OUTPATIENT
Start: 2020-12-07 | End: 2020-12-11

## 2020-12-07 RX ORDER — TRAMADOL HYDROCHLORIDE 50 MG/1
TABLET ORAL
Qty: 21 TABLET | Refills: 0 | Status: SHIPPED | OUTPATIENT
Start: 2020-12-07 | End: 2020-12-15 | Stop reason: SDUPTHER

## 2020-12-07 RX ORDER — LABETALOL 200 MG/1
200 TABLET, FILM COATED ORAL 2 TIMES DAILY
Qty: 180 TABLET | Refills: 3 | Status: SHIPPED | OUTPATIENT
Start: 2020-12-07 | End: 2021-03-17 | Stop reason: SDUPTHER

## 2020-12-11 DIAGNOSIS — E55.9 VITAMIN D DEFICIENCY: ICD-10-CM

## 2020-12-11 DIAGNOSIS — M54.50 CHRONIC BILATERAL LOW BACK PAIN WITHOUT SCIATICA: ICD-10-CM

## 2020-12-11 DIAGNOSIS — G89.29 CHRONIC BILATERAL LOW BACK PAIN WITHOUT SCIATICA: ICD-10-CM

## 2020-12-11 RX ORDER — ERGOCALCIFEROL 1.25 MG/1
CAPSULE ORAL
Qty: 8 CAPSULE | Refills: 4 | Status: SHIPPED | OUTPATIENT
Start: 2020-12-11 | End: 2021-09-20

## 2020-12-12 RX ORDER — NAPROXEN 500 MG/1
TABLET ORAL
Qty: 60 TABLET | Refills: 5 | Status: SHIPPED | OUTPATIENT
Start: 2020-12-12 | End: 2021-06-28

## 2020-12-15 DIAGNOSIS — G89.29 CHRONIC RIGHT-SIDED THORACIC BACK PAIN: ICD-10-CM

## 2020-12-15 DIAGNOSIS — M54.6 CHRONIC RIGHT-SIDED THORACIC BACK PAIN: ICD-10-CM

## 2020-12-15 DIAGNOSIS — M54.2 CERVICALGIA: ICD-10-CM

## 2020-12-15 RX ORDER — TRAMADOL HYDROCHLORIDE 50 MG/1
TABLET ORAL
Qty: 30 TABLET | Refills: 0 | Status: SHIPPED | OUTPATIENT
Start: 2020-12-15 | End: 2020-12-28 | Stop reason: SDUPTHER

## 2020-12-15 RX ORDER — OXYCODONE HYDROCHLORIDE AND ACETAMINOPHEN 5; 325 MG/1; MG/1
1 TABLET ORAL EVERY 8 HOURS PRN
Qty: 30 TABLET | Refills: 0 | Status: SHIPPED | OUTPATIENT
Start: 2020-12-15 | End: 2020-12-28 | Stop reason: SDUPTHER

## 2020-12-24 RX ORDER — HYDROXYZINE HYDROCHLORIDE 25 MG/1
TABLET, FILM COATED ORAL
Qty: 60 TABLET | OUTPATIENT
Start: 2020-12-24

## 2020-12-24 RX ORDER — LABETALOL 100 MG/1
TABLET, FILM COATED ORAL
Qty: 120 TABLET | Refills: 0 | OUTPATIENT
Start: 2020-12-24

## 2020-12-24 RX ORDER — LISINOPRIL 10 MG/1
TABLET ORAL
Qty: 90 TABLET | Refills: 0 | OUTPATIENT
Start: 2020-12-24

## 2020-12-28 DIAGNOSIS — M54.6 CHRONIC RIGHT-SIDED THORACIC BACK PAIN: ICD-10-CM

## 2020-12-28 DIAGNOSIS — G89.29 CHRONIC RIGHT-SIDED THORACIC BACK PAIN: ICD-10-CM

## 2020-12-28 DIAGNOSIS — M54.2 CERVICALGIA: ICD-10-CM

## 2020-12-28 DIAGNOSIS — M54.6 THORACIC BACK PAIN, UNSPECIFIED BACK PAIN LATERALITY, UNSPECIFIED CHRONICITY: Primary | ICD-10-CM

## 2020-12-28 RX ORDER — OXYCODONE HYDROCHLORIDE AND ACETAMINOPHEN 5; 325 MG/1; MG/1
1 TABLET ORAL EVERY 8 HOURS PRN
Qty: 30 TABLET | Refills: 0 | OUTPATIENT
Start: 2020-12-28

## 2020-12-28 RX ORDER — TRAMADOL HYDROCHLORIDE 50 MG/1
TABLET ORAL
Qty: 30 TABLET | Refills: 0 | OUTPATIENT
Start: 2020-12-28

## 2020-12-28 RX ORDER — TRAMADOL HYDROCHLORIDE 50 MG/1
TABLET ORAL
Qty: 30 TABLET | Refills: 0 | Status: SHIPPED | OUTPATIENT
Start: 2020-12-28 | End: 2021-01-10

## 2020-12-28 RX ORDER — OXYCODONE HYDROCHLORIDE AND ACETAMINOPHEN 5; 325 MG/1; MG/1
1 TABLET ORAL EVERY 8 HOURS PRN
Qty: 30 TABLET | Refills: 0 | Status: SHIPPED | OUTPATIENT
Start: 2020-12-28 | End: 2021-01-11 | Stop reason: SDUPTHER

## 2020-12-28 RX ORDER — TRAMADOL HYDROCHLORIDE 50 MG/1
TABLET ORAL
Qty: 30 TABLET | OUTPATIENT
Start: 2020-12-28

## 2020-12-28 NOTE — TELEPHONE ENCOUNTER
Patient is seeing multiple declined prescriptions in [facility] and wasn't aware that the pharmacy was requesting script and then she double requested it  She was calling to check on these 2 meds - percocet and tramadol    Also, asking for you to change the diagnosis on the PT order to include thoracic back pain so that they can focus on that   She has PT tonight

## 2020-12-29 ENCOUNTER — EVALUATION (OUTPATIENT)
Dept: PHYSICAL THERAPY | Facility: CLINIC | Age: 40
End: 2020-12-29
Payer: COMMERCIAL

## 2020-12-29 DIAGNOSIS — M54.2 CERVICALGIA: Primary | ICD-10-CM

## 2020-12-29 DIAGNOSIS — M54.6 PAIN IN THORACIC SPINE: ICD-10-CM

## 2020-12-29 PROCEDURE — 97110 THERAPEUTIC EXERCISES: CPT

## 2020-12-29 PROCEDURE — 97161 PT EVAL LOW COMPLEX 20 MIN: CPT

## 2020-12-31 ENCOUNTER — APPOINTMENT (OUTPATIENT)
Dept: PHYSICAL THERAPY | Facility: CLINIC | Age: 40
End: 2020-12-31
Payer: COMMERCIAL

## 2021-01-05 ENCOUNTER — OFFICE VISIT (OUTPATIENT)
Dept: PHYSICAL THERAPY | Facility: CLINIC | Age: 41
End: 2021-01-05
Payer: COMMERCIAL

## 2021-01-05 DIAGNOSIS — M54.6 PAIN IN THORACIC SPINE: ICD-10-CM

## 2021-01-05 DIAGNOSIS — M54.2 CERVICALGIA: Primary | ICD-10-CM

## 2021-01-05 PROCEDURE — 97140 MANUAL THERAPY 1/> REGIONS: CPT

## 2021-01-05 PROCEDURE — 97110 THERAPEUTIC EXERCISES: CPT

## 2021-01-05 PROCEDURE — 97112 NEUROMUSCULAR REEDUCATION: CPT

## 2021-01-05 NOTE — PROGRESS NOTES
Daily Note     Today's date: 2021  Patient name: Aron Rater  : 1980  MRN: [id number]  Referring provider: Robles Beckham MD  Dx:   Encounter Diagnosis     ICD-10-CM    1  Cervicalgia  M54 2    2  Pain in thoracic spine  M54 6        Start Time: 1400  Stop Time: 1445  Total time in clinic (min): 45 minutes    Subjective: Patient reports she feels mild improvement in range of motion since initiating HEP  She notes continued intensity and frequency of cervical and thoracic pain  She notes no headache at start of today's session  Objective: See treatment diary below      Assessment: Tolerated treatment well  Patient completed postural exercises, including scap retraction and cervical retraction against wall to decrease discomfort and assist in improving technique  She expressed pain along inferior angle of left scapula with additional exercises that improved with limiting range of motion  Patient will continue to benefit from skilled PT to improve functional mobility and activity tolerance  Plan: Continue per plan of care  Emphasize soft tissue mobilization of left scap inferior angle nv  Precautions: Scoliosis;  Hx of tachycardia, PAC, HTN      Manuals  - IE  - 2           STM (B UT, periscap)  8'           SOR, cervical distraction  4'                                     Neuro Re-Ed             Scap retract x5 x15           Cervical retract x5 x15           Chin tuck  x15           4 finger rotation  x10 ea                                                  Ther Ex             Cat/camel x10 x15           Open books x10 x15           Rows  YTB x10           Shld ext  YTB x10           R SL str over ball  x10           Self-TPR  3'           R SB  x10                        Ther Activity                                       Gait Training                                       Modalities             MHP  5' pre

## 2021-01-07 DIAGNOSIS — M54.2 CERVICALGIA: ICD-10-CM

## 2021-01-07 DIAGNOSIS — F41.0 PANIC ATTACKS: ICD-10-CM

## 2021-01-07 DIAGNOSIS — I10 ESSENTIAL HYPERTENSION: ICD-10-CM

## 2021-01-07 DIAGNOSIS — G89.29 CHRONIC RIGHT-SIDED THORACIC BACK PAIN: ICD-10-CM

## 2021-01-07 DIAGNOSIS — M54.6 CHRONIC RIGHT-SIDED THORACIC BACK PAIN: ICD-10-CM

## 2021-01-07 RX ORDER — HYDROXYZINE HYDROCHLORIDE 25 MG/1
TABLET, FILM COATED ORAL
Qty: 30 TABLET | Refills: 6 | Status: SHIPPED | OUTPATIENT
Start: 2021-01-07 | End: 2021-07-20

## 2021-01-07 RX ORDER — LISINOPRIL 10 MG/1
10 TABLET ORAL 2 TIMES DAILY
Qty: 120 TABLET | Refills: 0 | OUTPATIENT
Start: 2021-01-07

## 2021-01-07 RX ORDER — LABETALOL 200 MG/1
200 TABLET, FILM COATED ORAL 2 TIMES DAILY
Qty: 180 TABLET | Refills: 0 | OUTPATIENT
Start: 2021-01-07

## 2021-01-07 RX ORDER — TRAMADOL HYDROCHLORIDE 50 MG/1
TABLET ORAL
Qty: 30 TABLET | Refills: 0 | OUTPATIENT
Start: 2021-01-07

## 2021-01-07 RX ORDER — OXYCODONE HYDROCHLORIDE AND ACETAMINOPHEN 5; 325 MG/1; MG/1
1 TABLET ORAL EVERY 8 HOURS PRN
Qty: 30 TABLET | Refills: 0 | OUTPATIENT
Start: 2021-01-07

## 2021-01-08 ENCOUNTER — APPOINTMENT (OUTPATIENT)
Dept: PHYSICAL THERAPY | Facility: CLINIC | Age: 41
End: 2021-01-08
Payer: COMMERCIAL

## 2021-01-09 DIAGNOSIS — M54.2 CERVICALGIA: ICD-10-CM

## 2021-01-10 RX ORDER — TRAMADOL HYDROCHLORIDE 50 MG/1
TABLET ORAL
Qty: 60 TABLET | Refills: 0 | Status: SHIPPED | OUTPATIENT
Start: 2021-01-10 | End: 2021-02-07 | Stop reason: SDUPTHER

## 2021-01-11 DIAGNOSIS — G89.29 CHRONIC RIGHT-SIDED THORACIC BACK PAIN: ICD-10-CM

## 2021-01-11 DIAGNOSIS — M54.6 CHRONIC RIGHT-SIDED THORACIC BACK PAIN: ICD-10-CM

## 2021-01-11 RX ORDER — OXYCODONE HYDROCHLORIDE AND ACETAMINOPHEN 5; 325 MG/1; MG/1
1 TABLET ORAL EVERY 12 HOURS
Qty: 30 TABLET | Refills: 0 | Status: SHIPPED | OUTPATIENT
Start: 2021-01-11 | End: 2021-01-27 | Stop reason: SDUPTHER

## 2021-01-12 ENCOUNTER — APPOINTMENT (OUTPATIENT)
Dept: PHYSICAL THERAPY | Facility: CLINIC | Age: 41
End: 2021-01-12
Payer: COMMERCIAL

## 2021-01-15 ENCOUNTER — APPOINTMENT (OUTPATIENT)
Dept: PHYSICAL THERAPY | Facility: CLINIC | Age: 41
End: 2021-01-15
Payer: COMMERCIAL

## 2021-01-26 DIAGNOSIS — G89.29 CHRONIC RIGHT-SIDED THORACIC BACK PAIN: ICD-10-CM

## 2021-01-26 DIAGNOSIS — M54.6 CHRONIC RIGHT-SIDED THORACIC BACK PAIN: ICD-10-CM

## 2021-01-27 ENCOUNTER — TELEPHONE (OUTPATIENT)
Dept: PHYSICAL THERAPY | Facility: CLINIC | Age: 41
End: 2021-01-27

## 2021-01-27 DIAGNOSIS — G89.29 CHRONIC RIGHT-SIDED THORACIC BACK PAIN: ICD-10-CM

## 2021-01-27 DIAGNOSIS — Z23 ENCOUNTER FOR IMMUNIZATION: ICD-10-CM

## 2021-01-27 DIAGNOSIS — M54.6 CHRONIC RIGHT-SIDED THORACIC BACK PAIN: ICD-10-CM

## 2021-01-27 RX ORDER — OXYCODONE HYDROCHLORIDE AND ACETAMINOPHEN 5; 325 MG/1; MG/1
1 TABLET ORAL EVERY 12 HOURS
Qty: 30 TABLET | Refills: 0 | OUTPATIENT
Start: 2021-01-27

## 2021-01-27 RX ORDER — OXYCODONE HYDROCHLORIDE AND ACETAMINOPHEN 5; 325 MG/1; MG/1
1 TABLET ORAL EVERY 12 HOURS
Qty: 30 TABLET | Refills: 0 | Status: SHIPPED | OUTPATIENT
Start: 2021-01-27 | End: 2021-02-11 | Stop reason: SDUPTHER

## 2021-02-04 ENCOUNTER — EVALUATION (OUTPATIENT)
Dept: PHYSICAL THERAPY | Facility: CLINIC | Age: 41
End: 2021-02-04
Payer: COMMERCIAL

## 2021-02-04 DIAGNOSIS — M54.6 PAIN IN THORACIC SPINE: ICD-10-CM

## 2021-02-04 DIAGNOSIS — M54.2 CERVICALGIA: Primary | ICD-10-CM

## 2021-02-04 PROCEDURE — 97112 NEUROMUSCULAR REEDUCATION: CPT

## 2021-02-04 PROCEDURE — 97110 THERAPEUTIC EXERCISES: CPT

## 2021-02-04 PROCEDURE — 97140 MANUAL THERAPY 1/> REGIONS: CPT

## 2021-02-04 NOTE — PROGRESS NOTES
Progress Note     Today's date: 2021  Patient name: Evaristo Fong  : 1980  MRN: [id number]  Referring provider: Ashleigh Catherine MD  Dx:   Encounter Diagnosis     ICD-10-CM    1  Cervicalgia  M54 2    2  Pain in thoracic spine  M54 6        Start Time: 1700  Stop Time: 1743  Total time in clinic (min): 43 minutes    Subjective: Patient reports compliance with HEP since previous session on 2021  She notes gap in treatment due   She notes that since last week she has been experiencing an increase in sx, as bad as 10/10 in mid thoracic region  She notes that she has been pleased w/ the progress she has been able to make w/ HEP only, but has experienced a flare up in pain which has led to her returning for additional PT at this time  Goals  STG - 3 weeks  1  Patient will be independent and compliant with HEP  - MET  2  Patient will report >50% reduction in pain  - PARTIALLY MET  3  UE MMT will improve by >1/3 grade in all planes  - PROGRESSING    LTG - 6 weeks  1  Patient FOTO score will improve to >59/100  - MET   2  Patient will report no pain with completing chores around her home  - PROGRESSING  3   Patient will report no pain with carrying children  - PROGRESSING    Goals extended by 4 weeks at re-eval on 21 as patient has had gap in treatment due to     Objective: See treatment diary below    ervical/Thoracic Comments  Neurological Testing  -Sensation: light touch intact and equal bilaterally    Cervical ROM  -Flexion: 25% restriction (head drifts to left at end range)  -Extension: 25% restriction (head drifts to left at end range)  -L SB: 50% restriction  -R SB: 50%  -L Rot: 50%  -R Rot: 50%    Cervical ROM at PN on 21  -Flexion: <25% restriction  -Extension: <25%  -L SB: 50% (muscular tightness reported)  -R SB: 50% (muscular tightness reported)  -L Rot: 25%   -R Rot: 25%    MMT  -Isometric DNF: unable to maintain position  -Scapular Elevation: 4+/5 ; 4+/5  -Shoulder Flexion: 4/5 * ; 4/5  -Shoulder Abduction: 4/5 ; 4/5  -Elbow Flexion: 4+/5 ; 4+/5  -Elbow Extension: 4+/5 ; 4+/5    MMT at PN on 2/4  4+/5 and pain free in all planes    Palpation  -Right thoracic curve noted  -Mild hypomobility through t/s  -TTP and tightness of right suboccipital musculature compared to left    Palpation at PN on 2/4  -Radha-scapular tightness and trigger points noted bilaterally    Tests  -Spurling A to right elicited pain (left-sided in upper cervical region, right-sided in lower cervical region)  -T/S mobility: significant limitation with left rotation; mild limitation with right rotation (assessed in quadruped)    Tests at PN on 2/4: same as eval         Assessment: Patient has demonstrated gains in cervical ROM and strength since starting physical therapy and after completing HEP  She continues to demonstrate moderate sx irritability, particularly w/ poor posture, joint hypermobility, and muscular coordination deficits  The identified impairments limit her ability to sleep as well as her ability to perform household activities  She was provided w/ updated HEP during today's session, including for postural correction and diaphragmatic breathing (emphasizing rib expansion on left side)  She will continue to benefit from skilled PT to improve activity tolerance, muscular coordination, and functional mobility  Plan: Continue per plan of care  Precautions: Scoliosis;  Hx of tachycardia, PAC, HTN      Manuals 12/29 - IE 1/5 - 2 2/4          STM (B UT, periscap)  8' 8'          SOR, cervical distraction  4'                                     Neuro Re-Ed             Scap retract x5 x15 x15          Cervical retract x5 x15 x15          Chin tuck  x15           4 finger rotation  x10 ea           Diaphragmatic breathing   5'          Posture correct   3'                       Ther Ex             Cat/camel x10 x15 x15          Open books x10 x15           Rows  YTB x10 YTB x15 Shld ext  YTB x10 YTB x15          R SL str over ball  x10           Self-TPR  3' 5'          R SB  x10              FOTO, re-eval 10'          Ther Activity                                       Gait Training                                       Modalities             MHP  5' pre

## 2021-02-06 DIAGNOSIS — M54.2 CERVICALGIA: ICD-10-CM

## 2021-02-07 DIAGNOSIS — M54.2 CERVICALGIA: ICD-10-CM

## 2021-02-08 ENCOUNTER — TELEMEDICINE (OUTPATIENT)
Dept: PHYSICAL THERAPY | Facility: CLINIC | Age: 41
End: 2021-02-08
Payer: COMMERCIAL

## 2021-02-08 DIAGNOSIS — M54.2 CERVICALGIA: Primary | ICD-10-CM

## 2021-02-08 DIAGNOSIS — M54.6 PAIN IN THORACIC SPINE: ICD-10-CM

## 2021-02-08 PROCEDURE — 97112 NEUROMUSCULAR REEDUCATION: CPT

## 2021-02-08 RX ORDER — TRAMADOL HYDROCHLORIDE 50 MG/1
TABLET ORAL
Qty: 60 TABLET | OUTPATIENT
Start: 2021-02-08

## 2021-02-08 RX ORDER — TRAMADOL HYDROCHLORIDE 50 MG/1
TABLET ORAL
Qty: 60 TABLET | Refills: 0 | Status: SHIPPED | OUTPATIENT
Start: 2021-02-08 | End: 2021-03-02

## 2021-02-08 NOTE — PROGRESS NOTES
Daily Note     Today's date: 2021  Patient name: Katharine Otto  : 1980  MRN: [id number]  Referring provider: Yeny Cook MD  Dx:   Encounter Diagnosis     ICD-10-CM    1  Cervicalgia  M54 2    2  Pain in thoracic spine  M54 6        Start Time: 1530  Stop Time: 1600  Total time in clinic (min): 30 minutes  Telemedicine consent    Patient: Katharine Otto  Provider: Galileo Marino, PT  Provider located at [address] [place] [id number]    After connecting through [employer], the patient was identified by name and date of birth  Katharine Otto was informed that this is a telemedicine visit which may not be secure and therefore, might not be HIPAA-compliant  My office door was closed  No one else was in the room  She acknowledged consent and understanding of privacy and security of the platform  The patient has agreed to participate and understands they can discontinue the visit at any time  Patient is aware this is a billable service  Subjective: Patient reports experiencing mild muscular soreness following previous session  She continues to experience left sided pain at night secondary to muscle spasm  She notes improvement in her ability to expand left side of rib cage with diaphragmatic breathing  Objective: See treatment diary below      Assessment: Tolerated treatment well  Patient educated on stretches to complete before bed to assist in decreasing stiffness/discomfort reported  She was educated on self-rib mobilization w/ inhalation following report of mild anterior rib discomfort  She will continue to benefit from skilled PT to improve functional mobility and activity tolerance  Plan: Continue per plan of care  Precautions: Scoliosis;  Hx of tachycardia, PAC, HTN      Manuals  - IE  - 2  - virtual         STM (B UT, periscap)  8' 8'          SOR, cervical distraction  4'                                     Neuro Re-Ed             Scap retract x5 x15 x15 review         Cervical retract x5 x15 x15 reivew         Chin tuck  x15           4 finger rotation  x10 ea  review         Diaphragmatic breathing   5' 5'         Posture correct   3' 3'                      Ther Ex             Cat/camel x10 x15 x15 x10         Open books x10 x15  Review         Rows  YTB x10 YTB x15 YTB x20         Shld ext  YTB x10 YTB x15 YTB x20         R SL str over ball  x10           Self-TPR  3' 5'          R SB  x10           Quadruped t/s ext (rock back)    review                                      FOTO, re-eval 10'          Ther Activity                                       Gait Training                                       Modalities             MHP  5' pre

## 2021-02-11 DIAGNOSIS — G89.29 CHRONIC RIGHT-SIDED THORACIC BACK PAIN: ICD-10-CM

## 2021-02-11 DIAGNOSIS — M54.6 CHRONIC RIGHT-SIDED THORACIC BACK PAIN: ICD-10-CM

## 2021-02-11 DIAGNOSIS — F51.01 PRIMARY INSOMNIA: ICD-10-CM

## 2021-02-11 RX ORDER — ZOLPIDEM TARTRATE 5 MG/1
TABLET ORAL
Qty: 60 TABLET | Refills: 1 | Status: SHIPPED | OUTPATIENT
Start: 2021-02-11 | End: 2021-06-01

## 2021-02-11 RX ORDER — OXYCODONE HYDROCHLORIDE AND ACETAMINOPHEN 5; 325 MG/1; MG/1
1 TABLET ORAL EVERY 12 HOURS
Qty: 30 TABLET | Refills: 0 | Status: SHIPPED | OUTPATIENT
Start: 2021-02-11 | End: 2021-03-02 | Stop reason: SDUPTHER

## 2021-02-16 ENCOUNTER — APPOINTMENT (OUTPATIENT)
Dept: PHYSICAL THERAPY | Facility: CLINIC | Age: 41
End: 2021-02-16
Payer: COMMERCIAL

## 2021-02-17 ENCOUNTER — APPOINTMENT (OUTPATIENT)
Dept: PHYSICAL THERAPY | Facility: CLINIC | Age: 41
End: 2021-02-17
Payer: COMMERCIAL

## 2021-02-26 DIAGNOSIS — M54.6 CHRONIC RIGHT-SIDED THORACIC BACK PAIN: ICD-10-CM

## 2021-02-26 DIAGNOSIS — G89.29 CHRONIC RIGHT-SIDED THORACIC BACK PAIN: ICD-10-CM

## 2021-02-26 RX ORDER — OXYCODONE HYDROCHLORIDE AND ACETAMINOPHEN 5; 325 MG/1; MG/1
1 TABLET ORAL EVERY 12 HOURS
Qty: 30 TABLET | Refills: 0 | Status: CANCELLED | OUTPATIENT
Start: 2021-02-26

## 2021-03-02 ENCOUNTER — OFFICE VISIT (OUTPATIENT)
Dept: FAMILY MEDICINE CLINIC | Facility: CLINIC | Age: 41
End: 2021-03-02
Payer: COMMERCIAL

## 2021-03-02 VITALS
SYSTOLIC BLOOD PRESSURE: 102 MMHG | BODY MASS INDEX: 24.63 KG/M2 | HEART RATE: 96 BPM | OXYGEN SATURATION: 98 % | DIASTOLIC BLOOD PRESSURE: 80 MMHG | WEIGHT: 139 LBS | HEIGHT: 63 IN | TEMPERATURE: 98.2 F | RESPIRATION RATE: 16 BRPM

## 2021-03-02 DIAGNOSIS — M50.20 CERVICAL HERNIATED DISC: Primary | ICD-10-CM

## 2021-03-02 DIAGNOSIS — M54.6 CHRONIC RIGHT-SIDED THORACIC BACK PAIN: ICD-10-CM

## 2021-03-02 DIAGNOSIS — R53.83 FATIGUE, UNSPECIFIED TYPE: ICD-10-CM

## 2021-03-02 DIAGNOSIS — I49.1 PREMATURE ATRIAL COMPLEXES: ICD-10-CM

## 2021-03-02 DIAGNOSIS — M54.2 CERVICALGIA: ICD-10-CM

## 2021-03-02 DIAGNOSIS — N80.9 ENDOMETRIOSIS: ICD-10-CM

## 2021-03-02 DIAGNOSIS — G89.29 CHRONIC RIGHT-SIDED THORACIC BACK PAIN: ICD-10-CM

## 2021-03-02 DIAGNOSIS — G89.4 CHRONIC PAIN SYNDROME: ICD-10-CM

## 2021-03-02 DIAGNOSIS — I10 ESSENTIAL HYPERTENSION: ICD-10-CM

## 2021-03-02 DIAGNOSIS — F41.0 PANIC ATTACKS: ICD-10-CM

## 2021-03-02 PROCEDURE — 99214 OFFICE O/P EST MOD 30 MIN: CPT | Performed by: FAMILY MEDICINE

## 2021-03-02 PROCEDURE — 3008F BODY MASS INDEX DOCD: CPT | Performed by: FAMILY MEDICINE

## 2021-03-02 PROCEDURE — 3079F DIAST BP 80-89 MM HG: CPT | Performed by: FAMILY MEDICINE

## 2021-03-02 PROCEDURE — 3074F SYST BP LT 130 MM HG: CPT | Performed by: FAMILY MEDICINE

## 2021-03-02 PROCEDURE — 1036F TOBACCO NON-USER: CPT | Performed by: FAMILY MEDICINE

## 2021-03-02 RX ORDER — OXYCODONE HYDROCHLORIDE AND ACETAMINOPHEN 5; 325 MG/1; MG/1
1 TABLET ORAL EVERY 12 HOURS
Qty: 30 TABLET | Refills: 0 | Status: SHIPPED | OUTPATIENT
Start: 2021-03-02 | End: 2021-03-18 | Stop reason: SDUPTHER

## 2021-03-02 RX ORDER — TRAMADOL HYDROCHLORIDE 50 MG/1
TABLET ORAL
Qty: 30 TABLET | Refills: 0
Start: 2021-03-02 | End: 2021-03-15 | Stop reason: SDUPTHER

## 2021-03-02 RX ORDER — DULOXETIN HYDROCHLORIDE 20 MG/1
20 CAPSULE, DELAYED RELEASE ORAL DAILY
Qty: 30 CAPSULE | Refills: 6 | Status: SHIPPED | OUTPATIENT
Start: 2021-03-02 | End: 2022-01-22

## 2021-03-02 NOTE — PROGRESS NOTES
Subjective:           Problem List Items Addressed This Visit        Cardiovascular and Mediastinum    Essential hypertension stable cont medications    Relevant Orders    Comprehensive metabolic panel    Lipid panel    Premature atrial complexes stable cont medications    Relevant Orders    Comprehensive metabolic panel    Lipid panel       Musculoskeletal and Integument    Cervical herniated disc - Primary    Relevant Orders    Ambulatory referral to Pain Management       Other    Panic attacks    Relevant Medications    DULoxetine (CYMBALTA) 20 mg capsule    Endometriosis      Other Visit Diagnoses     Fatigue, unspecified type        Relevant Orders    TSH, 3rd generation    Chronic pain syndrome        Relevant Medications    DULoxetine (CYMBALTA) 20 mg capsule    Other Relevant Orders    Ambulatory referral to Pain Management    Chronic right-sided thoracic back pain        Relevant Medications    oxyCODONE-acetaminophen (PERCOCET) 5-325 mg per tablet    Cervicalgia        Relevant Medications    traMADol (ULTRAM) 50 mg tablet              Orders Placed This Encounter   Procedures    Comprehensive metabolic panel     This is a patient instruction: Patient fasting for 8 hours or longer recommended  Standing Status:   Future     Standing Expiration Date:   3/2/2022    Lipid panel     This is a patient instruction: This test requires patient fasting for 10-12 hours or longer  Drinking of black coffee or black tea is acceptable  Standing Status:   Future     Standing Expiration Date:   3/2/2022    TSH, 3rd generation     This is a patient instruction: This test is non-fasting  Please drink two glasses of water morning of bloodwork          Standing Status:   Future     Standing Expiration Date:   3/2/2022    Ambulatory referral to Pain Management     Standing Status:   Future     Standing Expiration Date:   3/2/2022     Referral Priority:   Routine     Referral Type:   Consult - AMB     Referral Reason:   Specialty Services Required     Referred to Provider:   Maritza Gonzalez MD     Requested Specialty:   Pain Medicine     Number of Visits Requested:   1     Expiration Date:   3/2/2022       Patient Instructions    Stay current with routine health maintenance screenings  Continue physical therapy attempt wean pain medications alternate treatment options discussed  Stay current with labs vaccinations    BMI Counseling: Body mass index is 24 62 kg/m²  Discussed the patient's BMI with her  The BMI     Yakelin A Honeymartinejennifer    Chief Complaint   Patient presents with    Medication Refill     Oxycodone     GEREMIAS Ashley is in for follow-up hypertension migraine headaches endometriosis cervical disc disease on medications pain for some time she is involved with physical therapy she has a history of panic episodes discussed weaning medications she does have a history of midthoracic scoliosis chronic myalgia pain    She needs labs    /80 (BP Location: Left arm, Patient Position: Sitting, Cuff Size: Standard)   Pulse 96   Temp 98 2 °F (36 8 °C) (Tympanic)   Resp 16   Ht 5' 3" (1 6 m)   Wt 63 kg (139 lb)   SpO2 98%   BMI 24 62 kg/m²       Allergies   Allergen Reactions    Cephalexin      Chest pain    Levofloxacin Diarrhea    Sulfa Antibiotics Hives     With swelling       Current Outpatient Medications on File Prior to Visit   Medication Sig Dispense Refill    acetaminophen (Tylenol) 325 mg tablet Take 1 tablet (325 mg total) by mouth every 6 (six) hours as needed for mild pain 90 tablet 3    Dapsone 5 % topical gel APPLY EXTERNALLY TO THE AFFECTED AREA TWICE DAILY 90 g 5    ergocalciferol (VITAMIN D2) 50,000 units TAKE 1 CAPSULE BY MOUTH 1 TIME A WEEK 8 capsule 4    hydrOXYzine HCL (ATARAX) 25 mg tablet TAKE 1 TABLET(25 MG) BY MOUTH DAILY AT BEDTIME 30 tablet 6    labetalol (NORMODYNE) 200 mg tablet Take 1 tablet (200 mg total) by mouth 2 (two) times a day 180 tablet 3    lisinopril (ZESTRIL) 10 mg tablet       lisinopril (ZESTRIL) 10 mg tablet Take 1 tablet (10 mg total) by mouth 2 (two) times a day 120 tablet 3    naproxen (NAPROSYN) 500 mg tablet TAKE 1 TABLET(500 MG) BY MOUTH TWICE DAILY WITH MEALS 60 tablet 5    Omega-3 Fatty Acids (FISH OIL) 1,000 mg Take 1,000 mg by mouth daily      Prenatal Vit-Fe Fumarate-FA (PRENATAL VITAMIN PLUS LOW IRON) 27-1 MG TABS Take 1 tablet by mouth daily      zolpidem (AMBIEN) 5 mg tablet TAKE 1 TABLET(5 MG) BY MOUTH DAILY AT BEDTIME AS NEEDED FOR SLEEP 60 tablet 1    [DISCONTINUED] oxyCODONE-acetaminophen (PERCOCET) 5-325 mg per tablet Take 1 tablet by mouth every 12 (twelve) hours needs OV before next refill thanksMax Daily Amount: 2 tablets 30 tablet 0    [DISCONTINUED] traMADol (ULTRAM) 50 mg tablet 1 tablet twice daily for pain 60 tablet 0    butalbital-acetaminophen-caffeine (FIORICET,ESGIC) -40 mg per tablet Take 1 tablet by mouth every 4 (four) hours as needed for headaches (Patient not taking: Reported on 2020) 5 tablet 0    labetalol (NORMODYNE) 100 mg tablet Take 1 tablet (100 mg total) by mouth 2 (two) times a day (Patient not taking: Reported on 2020) 60 tablet 2    lisinopril (ZESTRIL) 10 mg tablet Take 1 tablet (10 mg total) by mouth daily (Patient not taking: Reported on 2020) 90 tablet 3    [DISCONTINUED] naproxen (NAPROSYN) 500 mg tablet TAKE 1 TABLET(500 MG) BY MOUTH TWICE DAILY WITH MEALS 60 tablet 5     No current facility-administered medications on file prior to visit          Past Medical History:   Diagnosis Date    Chronic fatigue     last assessed 16    Endometriosis     Hypertension     Hypoglycemia     Liver lesion     last assessed 10/2/15    Migraine     Ovarian cyst     Scoliosis     last assessed  10/21/13    Varicella     childhood       Past Surgical History:   Procedure Laterality Date     SECTION      x2    ECTOPIC PREGNANCY SURGERY      LAPAROSCOPY      exploratory    NE  DELIVERY ONLY N/A 3/26/2020    Procedure:  SECTION () REPEAT;  Surgeon: Maris Black MD;  [facility]: [facility] ;  Service: Obstetrics    SALPINGECTOMY            reports that she has quit smoking  She has never used smokeless tobacco  She reports current alcohol use  She reports that she does not use drugs  reports that she has quit smoking  She has never used smokeless tobacco         Review of Systems   Constitutional: Negative for activity change, chills and fever  HENT: Negative for sinus pain, sore throat and trouble swallowing  Eyes: Negative for pain  Respiratory: Negative for apnea, cough, chest tightness, shortness of breath, wheezing and stridor  Cardiovascular: Negative for chest pain  Gastrointestinal: Negative for abdominal distention, blood in stool and rectal pain  Endocrine: Negative for cold intolerance and polydipsia  Genitourinary: Negative for flank pain, urgency, vaginal bleeding and vaginal discharge  Musculoskeletal: Positive for back pain, myalgias and neck pain  Negative for arthralgias and gait problem  Skin: Negative for color change and rash  Neurological: Positive for headaches  Negative for dizziness, tremors and seizures  Hematological: Negative for adenopathy  Psychiatric/Behavioral: Positive for sleep disturbance  Negative for agitation, behavioral problems, confusion, dysphoric mood and suicidal ideas  The patient is not nervous/anxious  All other systems reviewed and are negative  Physical Exam  Constitutional:       Appearance: She is well-developed  HENT:      Head: Normocephalic and atraumatic  Eyes:      General: No scleral icterus  Conjunctiva/sclera: Conjunctivae normal       Pupils: Pupils are equal, round, and reactive to light  Neck:      Thyroid: No thyromegaly  Vascular: No JVD  Cardiovascular:      Rate and Rhythm: Normal rate and regular rhythm  Heart sounds: No murmur     Pulmonary:      Effort: Pulmonary effort is normal       Breath sounds: Normal breath sounds  No stridor  Abdominal:      General: Abdomen is flat  There is no distension  Tenderness: There is no guarding  Musculoskeletal:         General: No deformity  Comments: Limited rom c spine rotational scoliosis   Lymphadenopathy:      Cervical: No cervical adenopathy  Skin:     General: Skin is warm and dry  Capillary Refill: Capillary refill takes less than 2 seconds  Findings: No erythema  Neurological:      General: No focal deficit present  Cranial Nerves: No cranial nerve deficit  Psychiatric:         Behavior: Behavior normal          Thought Content:  Thought content normal          Judgment: Judgment normal

## 2021-03-02 NOTE — PATIENT INSTRUCTIONS
Stay current with routine health maintenance screenings  Continue physical therapy attempt wean pain medications alternate treatment options discussed    Stay current with labs vaccinations  Cardiology evaluation

## 2021-03-15 DIAGNOSIS — M54.2 CERVICALGIA: ICD-10-CM

## 2021-03-15 RX ORDER — TRAMADOL HYDROCHLORIDE 50 MG/1
TABLET ORAL
Qty: 30 TABLET | Refills: 0 | Status: SHIPPED | OUTPATIENT
Start: 2021-03-15 | End: 2021-04-12

## 2021-03-15 RX ORDER — TRAMADOL HYDROCHLORIDE 50 MG/1
TABLET ORAL
Qty: 30 TABLET | Refills: 0
Start: 2021-03-15 | End: 2021-03-15 | Stop reason: SDUPTHER

## 2021-03-17 DIAGNOSIS — I10 ESSENTIAL HYPERTENSION: ICD-10-CM

## 2021-03-17 RX ORDER — LABETALOL 200 MG/1
200 TABLET, FILM COATED ORAL 2 TIMES DAILY
Qty: 180 TABLET | Refills: 3 | Status: SHIPPED | OUTPATIENT
Start: 2021-03-17 | End: 2022-05-12 | Stop reason: ALTCHOICE

## 2021-03-18 ENCOUNTER — TELEPHONE (OUTPATIENT)
Dept: FAMILY MEDICINE CLINIC | Facility: CLINIC | Age: 41
End: 2021-03-18

## 2021-03-18 DIAGNOSIS — M50.20 CERVICAL HERNIATED DISC: Primary | ICD-10-CM

## 2021-03-18 DIAGNOSIS — G89.29 CHRONIC RIGHT-SIDED THORACIC BACK PAIN: ICD-10-CM

## 2021-03-18 DIAGNOSIS — M41.9 SCOLIOSIS OF THORACIC SPINE, UNSPECIFIED SCOLIOSIS TYPE: ICD-10-CM

## 2021-03-18 DIAGNOSIS — M54.6 CHRONIC RIGHT-SIDED THORACIC BACK PAIN: ICD-10-CM

## 2021-03-18 RX ORDER — OXYCODONE HYDROCHLORIDE AND ACETAMINOPHEN 5; 325 MG/1; MG/1
TABLET ORAL
Qty: 30 TABLET | Refills: 0 | Status: SHIPPED | OUTPATIENT
Start: 2021-03-18 | End: 2021-04-02 | Stop reason: SDUPTHER

## 2021-03-18 NOTE — TELEPHONE ENCOUNTER
Patient called, requesting a new Pain Management referral because the one you gave her for  are not responding  Please place a referral for a pain management outside network

## 2021-03-18 NOTE — TELEPHONE ENCOUNTER
She will need to find someone in the plan and athen we can prepare referall I will place a generic referral in chart

## 2021-03-25 DIAGNOSIS — I10 ESSENTIAL HYPERTENSION: ICD-10-CM

## 2021-03-26 RX ORDER — LISINOPRIL 10 MG/1
TABLET ORAL
Qty: 90 TABLET | Refills: 3 | Status: SHIPPED | OUTPATIENT
Start: 2021-03-26 | End: 2021-06-29 | Stop reason: SDUPTHER

## 2021-04-01 DIAGNOSIS — G89.29 CHRONIC RIGHT-SIDED THORACIC BACK PAIN: ICD-10-CM

## 2021-04-01 DIAGNOSIS — M54.6 CHRONIC RIGHT-SIDED THORACIC BACK PAIN: ICD-10-CM

## 2021-04-02 DIAGNOSIS — G89.29 CHRONIC RIGHT-SIDED THORACIC BACK PAIN: ICD-10-CM

## 2021-04-02 DIAGNOSIS — M54.6 CHRONIC RIGHT-SIDED THORACIC BACK PAIN: ICD-10-CM

## 2021-04-02 RX ORDER — OXYCODONE HYDROCHLORIDE AND ACETAMINOPHEN 5; 325 MG/1; MG/1
TABLET ORAL
Qty: 30 TABLET | Refills: 0 | OUTPATIENT
Start: 2021-04-02

## 2021-04-02 RX ORDER — OXYCODONE HYDROCHLORIDE AND ACETAMINOPHEN 5; 325 MG/1; MG/1
TABLET ORAL
Qty: 30 TABLET | Refills: 0 | Status: SHIPPED | OUTPATIENT
Start: 2021-04-02 | End: 2021-04-16 | Stop reason: SDUPTHER

## 2021-04-08 NOTE — PROGRESS NOTES
Patient is discharged from physical therapy episode due lapse in treatment and limited compliance with attending sessions (multiple cancellations/no-show visits)

## 2021-04-11 DIAGNOSIS — M54.2 CERVICALGIA: ICD-10-CM

## 2021-04-12 RX ORDER — TRAMADOL HYDROCHLORIDE 50 MG/1
TABLET ORAL
Qty: 30 TABLET | Refills: 0 | Status: SHIPPED | OUTPATIENT
Start: 2021-04-12 | End: 2021-05-10

## 2021-04-16 DIAGNOSIS — M54.6 CHRONIC RIGHT-SIDED THORACIC BACK PAIN: ICD-10-CM

## 2021-04-16 DIAGNOSIS — G89.29 CHRONIC RIGHT-SIDED THORACIC BACK PAIN: ICD-10-CM

## 2021-04-16 RX ORDER — OXYCODONE HYDROCHLORIDE AND ACETAMINOPHEN 5; 325 MG/1; MG/1
TABLET ORAL
Qty: 30 TABLET | Refills: 0 | Status: SHIPPED | OUTPATIENT
Start: 2021-04-16 | End: 2021-04-30 | Stop reason: SDUPTHER

## 2021-04-30 DIAGNOSIS — L70.0 ACNE VULGARIS: ICD-10-CM

## 2021-04-30 DIAGNOSIS — M54.6 CHRONIC RIGHT-SIDED THORACIC BACK PAIN: ICD-10-CM

## 2021-04-30 DIAGNOSIS — G89.29 CHRONIC RIGHT-SIDED THORACIC BACK PAIN: ICD-10-CM

## 2021-04-30 RX ORDER — OXYCODONE HYDROCHLORIDE AND ACETAMINOPHEN 5; 325 MG/1; MG/1
TABLET ORAL
Qty: 30 TABLET | Refills: 0 | Status: SHIPPED | OUTPATIENT
Start: 2021-04-30 | End: 2021-05-14 | Stop reason: SDUPTHER

## 2021-04-30 RX ORDER — DAPSONE 50 MG/G
GEL TOPICAL
Qty: 90 G | Refills: 2 | Status: SHIPPED | OUTPATIENT
Start: 2021-04-30 | End: 2021-11-23 | Stop reason: SDUPTHER

## 2021-05-09 DIAGNOSIS — M54.2 CERVICALGIA: ICD-10-CM

## 2021-05-10 RX ORDER — TRAMADOL HYDROCHLORIDE 50 MG/1
TABLET ORAL
Qty: 30 TABLET | Refills: 0 | Status: SHIPPED | OUTPATIENT
Start: 2021-05-10 | End: 2021-06-07

## 2021-05-14 DIAGNOSIS — G89.29 CHRONIC RIGHT-SIDED THORACIC BACK PAIN: ICD-10-CM

## 2021-05-14 DIAGNOSIS — M54.6 CHRONIC RIGHT-SIDED THORACIC BACK PAIN: ICD-10-CM

## 2021-05-14 RX ORDER — OXYCODONE HYDROCHLORIDE AND ACETAMINOPHEN 5; 325 MG/1; MG/1
TABLET ORAL
Qty: 60 TABLET | Refills: 0 | Status: SHIPPED | OUTPATIENT
Start: 2021-05-14 | End: 2021-06-14 | Stop reason: SDUPTHER

## 2021-05-31 DIAGNOSIS — F51.01 PRIMARY INSOMNIA: ICD-10-CM

## 2021-06-01 RX ORDER — ZOLPIDEM TARTRATE 5 MG/1
TABLET ORAL
Qty: 60 TABLET | Refills: 2 | Status: SHIPPED | OUTPATIENT
Start: 2021-06-01 | End: 2021-11-21

## 2021-06-07 DIAGNOSIS — M54.2 CERVICALGIA: ICD-10-CM

## 2021-06-07 RX ORDER — TRAMADOL HYDROCHLORIDE 50 MG/1
TABLET ORAL
Qty: 30 TABLET | Refills: 1 | Status: SHIPPED | OUTPATIENT
Start: 2021-06-07 | End: 2021-08-02

## 2021-06-14 DIAGNOSIS — G89.29 CHRONIC RIGHT-SIDED THORACIC BACK PAIN: ICD-10-CM

## 2021-06-14 DIAGNOSIS — M54.6 CHRONIC RIGHT-SIDED THORACIC BACK PAIN: ICD-10-CM

## 2021-06-14 RX ORDER — OXYCODONE HYDROCHLORIDE AND ACETAMINOPHEN 5; 325 MG/1; MG/1
TABLET ORAL
Qty: 60 TABLET | Refills: 0 | Status: SHIPPED | OUTPATIENT
Start: 2021-06-14 | End: 2021-06-29

## 2021-06-28 ENCOUNTER — APPOINTMENT (OUTPATIENT)
Dept: LAB | Facility: CLINIC | Age: 41
End: 2021-06-28
Payer: COMMERCIAL

## 2021-06-28 DIAGNOSIS — I49.1 PREMATURE ATRIAL COMPLEXES: ICD-10-CM

## 2021-06-28 DIAGNOSIS — R53.83 FATIGUE, UNSPECIFIED TYPE: ICD-10-CM

## 2021-06-28 DIAGNOSIS — G89.29 CHRONIC BILATERAL LOW BACK PAIN WITHOUT SCIATICA: ICD-10-CM

## 2021-06-28 DIAGNOSIS — M54.50 CHRONIC BILATERAL LOW BACK PAIN WITHOUT SCIATICA: ICD-10-CM

## 2021-06-28 DIAGNOSIS — I10 ESSENTIAL HYPERTENSION: ICD-10-CM

## 2021-06-28 LAB
ALBUMIN SERPL BCP-MCNC: 3.6 G/DL (ref 3.5–5)
ALP SERPL-CCNC: 61 U/L (ref 46–116)
ALT SERPL W P-5'-P-CCNC: 20 U/L (ref 12–78)
ANION GAP SERPL CALCULATED.3IONS-SCNC: 5 MMOL/L (ref 4–13)
AST SERPL W P-5'-P-CCNC: 15 U/L (ref 5–45)
BILIRUB SERPL-MCNC: 0.41 MG/DL (ref 0.2–1)
BUN SERPL-MCNC: 20 MG/DL (ref 5–25)
CALCIUM SERPL-MCNC: 8.4 MG/DL (ref 8.3–10.1)
CHLORIDE SERPL-SCNC: 106 MMOL/L (ref 100–108)
CHOLEST SERPL-MCNC: 185 MG/DL (ref 50–200)
CO2 SERPL-SCNC: 25 MMOL/L (ref 21–32)
CREAT SERPL-MCNC: 0.56 MG/DL (ref 0.6–1.3)
GFR SERPL CREATININE-BSD FRML MDRD: 118 ML/MIN/1.73SQ M
GLUCOSE P FAST SERPL-MCNC: 92 MG/DL (ref 65–99)
HDLC SERPL-MCNC: 62 MG/DL
LDLC SERPL CALC-MCNC: 112 MG/DL (ref 0–100)
NONHDLC SERPL-MCNC: 123 MG/DL
POTASSIUM SERPL-SCNC: 4 MMOL/L (ref 3.5–5.3)
PROT SERPL-MCNC: 6.7 G/DL (ref 6.4–8.2)
SODIUM SERPL-SCNC: 136 MMOL/L (ref 136–145)
TRIGL SERPL-MCNC: 55 MG/DL
TSH SERPL DL<=0.05 MIU/L-ACNC: 0.94 UIU/ML (ref 0.36–3.74)

## 2021-06-28 PROCEDURE — 36415 COLL VENOUS BLD VENIPUNCTURE: CPT

## 2021-06-28 PROCEDURE — 84443 ASSAY THYROID STIM HORMONE: CPT

## 2021-06-28 PROCEDURE — 80053 COMPREHEN METABOLIC PANEL: CPT

## 2021-06-28 PROCEDURE — 80061 LIPID PANEL: CPT

## 2021-06-28 RX ORDER — NAPROXEN 500 MG/1
TABLET ORAL
Qty: 60 TABLET | Refills: 5 | Status: SHIPPED | OUTPATIENT
Start: 2021-06-28 | End: 2021-12-27

## 2021-06-29 ENCOUNTER — OFFICE VISIT (OUTPATIENT)
Dept: FAMILY MEDICINE CLINIC | Facility: CLINIC | Age: 41
End: 2021-06-29
Payer: COMMERCIAL

## 2021-06-29 VITALS
RESPIRATION RATE: 16 BRPM | TEMPERATURE: 98.1 F | HEIGHT: 63 IN | BODY MASS INDEX: 24.59 KG/M2 | OXYGEN SATURATION: 99 % | HEART RATE: 89 BPM | SYSTOLIC BLOOD PRESSURE: 94 MMHG | WEIGHT: 138.8 LBS | DIASTOLIC BLOOD PRESSURE: 70 MMHG

## 2021-06-29 DIAGNOSIS — M50.20 CERVICAL HERNIATED DISC: ICD-10-CM

## 2021-06-29 DIAGNOSIS — E55.9 VITAMIN D DEFICIENCY: ICD-10-CM

## 2021-06-29 DIAGNOSIS — Z00.00 PHYSICAL EXAM, ANNUAL: Primary | ICD-10-CM

## 2021-06-29 DIAGNOSIS — F41.1 GENERALIZED ANXIETY DISORDER: ICD-10-CM

## 2021-06-29 DIAGNOSIS — I10 ESSENTIAL HYPERTENSION: ICD-10-CM

## 2021-06-29 DIAGNOSIS — G89.29 CHRONIC RIGHT-SIDED THORACIC BACK PAIN: ICD-10-CM

## 2021-06-29 DIAGNOSIS — M54.6 CHRONIC RIGHT-SIDED THORACIC BACK PAIN: ICD-10-CM

## 2021-06-29 DIAGNOSIS — M41.9 SCOLIOSIS OF THORACIC SPINE, UNSPECIFIED SCOLIOSIS TYPE: ICD-10-CM

## 2021-06-29 PROCEDURE — 99395 PREV VISIT EST AGE 18-39: CPT | Performed by: FAMILY MEDICINE

## 2021-06-29 RX ORDER — OXYCODONE HYDROCHLORIDE AND ACETAMINOPHEN 5; 325 MG/1; MG/1
TABLET ORAL
Qty: 30 TABLET | Refills: 0
Start: 2021-06-29 | End: 2021-07-26 | Stop reason: SDUPTHER

## 2021-06-29 NOTE — PROGRESS NOTES
Subjective:           Problem List Items Addressed This Visit        Cardiovascular and Mediastinum    Essential hypertension stable changing meds continue follow up       Musculoskeletal and Integument    Scoliosis cont exercise flexibility    Cervical herniated disc as above wean medications       Other    Generalized anxiety disorder    Vitamin D deficiency      Other Visit Diagnoses     Physical exam, annual    -  Primary              No orders of the defined types were placed in this encounter  Patient Instructions     Stay current with gyn follow-up mammogram update physical therapy options attempt to wean medications for pain continue current medications vaccination update    Recent Results (from the past 1344 hour(s))   Comprehensive metabolic panel    Collection Time: 06/28/21 10:40 AM   Result Value Ref Range    Sodium 136 136 - 145 mmol/L    Potassium 4 0 3 5 - 5 3 mmol/L    Chloride 106 100 - 108 mmol/L    CO2 25 21 - 32 mmol/L    ANION GAP 5 4 - 13 mmol/L    BUN 20 5 - 25 mg/dL    Creatinine 0 56 (L) 0 60 - 1 30 mg/dL    Glucose, Fasting 92 65 - 99 mg/dL    Calcium 8 4 8 3 - 10 1 mg/dL    AST 15 5 - 45 U/L    ALT 20 12 - 78 U/L    Alkaline Phosphatase 61 46 - 116 U/L    Total Protein 6 7 6 4 - 8 2 g/dL    Albumin 3 6 3 5 - 5 0 g/dL    Total Bilirubin 0 41 0 20 - 1 00 mg/dL    eGFR 118 ml/min/1 73sq m   Lipid panel    Collection Time: 06/28/21 10:40 AM   Result Value Ref Range    Cholesterol 185 50 - 200 mg/dL    Triglycerides 55 <=150 mg/dL    HDL, Direct 62 >=40 mg/dL    LDL Calculated 112 (H) 0 - 100 mg/dL    Non-HDL-Chol (CHOL-HDL) 123 mg/dl   TSH, 3rd generation    Collection Time: 06/28/21 10:40 AM   Result Value Ref Range    TSH 3RD GENERATON 0 935 0 358 - 3 740 uIU/mL         BMI Counseling: Body mass index is 24 59 kg/m²  Discussed the patient's BMI with her   [facility] [place] [facility]    Chief Complaint   Patient presents with    Annual Exam     Starting in the beginning of the year, had pain in sternum during PT  Says that now it's an irritation feeling on the inside like when your bra digs into your skin  HPI Ashley is in for physical exam she has a history of hypertension anxiety with panic attacks PACs history of endometriosis foot ankle pain cervical disc disease scoliosis  Weaning meds to daily ior PRN use  She had recent labs reviewed unremarkable    She follows with OBGYN    BP 94/70 (BP Location: Left arm, Patient Position: Sitting, Cuff Size: Standard)   Pulse 89   Temp 98 1 °F (36 7 °C) (Tympanic)   Resp 16   Ht 5' 3" (1 6 m)   Wt 63 kg (138 lb 12 8 oz)   SpO2 99%   BMI 24 59 kg/m²       Allergies   Allergen Reactions    Cephalexin      Chest pain    Levofloxacin Diarrhea    Sulfa Antibiotics Hives     With swelling       Current Outpatient Medications on File Prior to Visit   Medication Sig Dispense Refill    acetaminophen (Tylenol) 325 mg tablet Take 1 tablet (325 mg total) by mouth every 6 (six) hours as needed for mild pain 90 tablet 3    carvedilol (COREG) 6 25 mg tablet Take 6 25 mg by mouth      Dapsone 5 % topical gel Apply BID 90 g 2    ergocalciferol (VITAMIN D2) 50,000 units TAKE 1 CAPSULE BY MOUTH 1 TIME A WEEK 8 capsule 4    hydrOXYzine HCL (ATARAX) 25 mg tablet TAKE 1 TABLET(25 MG) BY MOUTH DAILY AT BEDTIME 30 tablet 6    lisinopril (ZESTRIL) 10 mg tablet Take 1 tablet (10 mg total) by mouth 2 (two) times a day 120 tablet 3    naproxen (NAPROSYN) 500 mg tablet TAKE 1 TABLET(500 MG) BY MOUTH TWICE DAILY WITH MEALS 60 tablet 5    Omega-3 Fatty Acids (FISH OIL) 1,000 mg Take 1,000 mg by mouth daily      traMADol (ULTRAM) 50 mg tablet TAKE 1 TABLET BY MOUTH DAILY FOR PAIN 30 tablet 1    zolpidem (AMBIEN) 5 mg tablet TAKE 1 TABLET(5 MG) BY MOUTH DAILY AT BEDTIME AS NEEDED FOR SLEEP 60 tablet 2    [DISCONTINUED] oxyCODONE-acetaminophen (PERCOCET) 5-325 mg per tablet 1 tablet twice daily for back pain (Patient taking differently: 1 tablet as needed 1 tablet twice daily for back pain) 60 tablet 0    butalbital-acetaminophen-caffeine (FIORICET,ESGIC) -40 mg per tablet Take 1 tablet by mouth every 4 (four) hours as needed for headaches (Patient not taking: Reported on 2020) 5 tablet 0    DULoxetine (CYMBALTA) 20 mg capsule Take 1 capsule (20 mg total) by mouth daily (Patient not taking: Reported on 2021) 30 capsule 6    labetalol (NORMODYNE) 100 mg tablet Take 1 tablet (100 mg total) by mouth 2 (two) times a day (Patient not taking: Reported on 2020) 60 tablet 2    labetalol (NORMODYNE) 200 mg tablet Take 1 tablet (200 mg total) by mouth 2 (two) times a day 180 tablet 3    lisinopril (ZESTRIL) 10 mg tablet       Prenatal Vit-Fe Fumarate-FA (PRENATAL VITAMIN PLUS LOW IRON) 27-1 MG TABS Take 1 tablet by mouth daily      [DISCONTINUED] lisinopril (ZESTRIL) 10 mg tablet Take 1 tablet (10 mg total) by mouth daily (Patient not taking: Reported on 2020) 90 tablet 3    [DISCONTINUED] lisinopril (ZESTRIL) 10 mg tablet TAKE 1 TABLET(10 MG) BY MOUTH DAILY 90 tablet 3    [DISCONTINUED] naproxen (NAPROSYN) 500 mg tablet TAKE 1 TABLET(500 MG) BY MOUTH TWICE DAILY WITH MEALS 60 tablet 5     No current facility-administered medications on file prior to visit  Past Medical History:   Diagnosis Date    Chronic fatigue     last assessed 16    Endometriosis     Hypertension     Hypoglycemia     Liver lesion     last assessed 10/2/15    Migraine     Ovarian cyst     Scoliosis     last assessed  10/21/13    Varicella     childhood       Past Surgical History:   Procedure Laterality Date     SECTION      x2    ECTOPIC PREGNANCY SURGERY      LAPAROSCOPY      exploratory    MS  DELIVERY ONLY N/A 3/26/2020    Procedure:  SECTION () REPEAT;  Surgeon: Astrid Sommer MD;  Location: [facility] ;  Service: Obstetrics    SALPINGECTOMY            reports that she has quit smoking   She has never used smokeless tobacco  She reports current alcohol use  She reports that she does not use drugs  reports that she has quit smoking  She has never used smokeless tobacco         Review of Systems   Constitutional: Negative for activity change, chills and fever  HENT: Negative for sinus pain, sore throat and trouble swallowing  Eyes: Negative for pain  Respiratory: Negative for apnea, cough, chest tightness, shortness of breath, wheezing and stridor  Cardiovascular: Negative for chest pain  Gastrointestinal: Negative for abdominal distention, blood in stool and rectal pain  Endocrine: Negative for cold intolerance and polydipsia  Genitourinary: Negative for flank pain, urgency, vaginal bleeding and vaginal discharge  Musculoskeletal: Positive for back pain, myalgias and neck pain  Negative for arthralgias and gait problem  Skin: Negative for color change and rash  Neurological: Positive for headaches  Negative for dizziness, tremors and seizures  Hematological: Negative for adenopathy  Psychiatric/Behavioral: Positive for sleep disturbance  Negative for agitation, behavioral problems, confusion, dysphoric mood and suicidal ideas  The patient is not nervous/anxious  All other systems reviewed and are negative  Physical Exam  Constitutional:       Appearance: She is well-developed  HENT:      Head: Normocephalic and atraumatic  Right Ear: Tympanic membrane normal       Left Ear: Tympanic membrane normal    Eyes:      General: No scleral icterus  Conjunctiva/sclera: Conjunctivae normal       Pupils: Pupils are equal, round, and reactive to light  Neck:      Thyroid: No thyromegaly  Vascular: No JVD  Cardiovascular:      Rate and Rhythm: Normal rate and regular rhythm  Heart sounds: No murmur heard  Pulmonary:      Effort: Pulmonary effort is normal       Breath sounds: Normal breath sounds  No stridor  No wheezing  Abdominal:      General: Abdomen is flat   There is no distension  Tenderness: There is no guarding  Musculoskeletal:         General: No deformity  Comments: Limited rom c spine rotational scoliosis   Lymphadenopathy:      Cervical: No cervical adenopathy  Skin:     General: Skin is warm and dry  Capillary Refill: Capillary refill takes less than 2 seconds  Findings: No erythema  Neurological:      General: No focal deficit present  Mental Status: She is oriented to person, place, and time  Mental status is at baseline  Cranial Nerves: No cranial nerve deficit  Psychiatric:         Behavior: Behavior normal          Thought Content:  Thought content normal          Judgment: Judgment normal

## 2021-06-29 NOTE — PATIENT INSTRUCTIONS
Stay current with gyn follow-up mammogram update physical therapy options attempt to wean medications for pain continue current medications vaccination update    Recent Results (from the past 1344 hour(s))   Comprehensive metabolic panel    Collection Time: 06/28/21 10:40 AM   Result Value Ref Range    Sodium 136 136 - 145 mmol/L    Potassium 4 0 3 5 - 5 3 mmol/L    Chloride 106 100 - 108 mmol/L    CO2 25 21 - 32 mmol/L    ANION GAP 5 4 - 13 mmol/L    BUN 20 5 - 25 mg/dL    Creatinine 0 56 (L) 0 60 - 1 30 mg/dL    Glucose, Fasting 92 65 - 99 mg/dL    Calcium 8 4 8 3 - 10 1 mg/dL    AST 15 5 - 45 U/L    ALT 20 12 - 78 U/L    Alkaline Phosphatase 61 46 - 116 U/L    Total Protein 6 7 6 4 - 8 2 g/dL    Albumin 3 6 3 5 - 5 0 g/dL    Total Bilirubin 0 41 0 20 - 1 00 mg/dL    eGFR 118 ml/min/1 73sq m   Lipid panel    Collection Time: 06/28/21 10:40 AM   Result Value Ref Range    Cholesterol 185 50 - 200 mg/dL    Triglycerides 55 <=150 mg/dL    HDL, Direct 62 >=40 mg/dL    LDL Calculated 112 (H) 0 - 100 mg/dL    Non-HDL-Chol (CHOL-HDL) 123 mg/dl   TSH, 3rd generation    Collection Time: 06/28/21 10:40 AM   Result Value Ref Range    TSH 3RD GENERATON 0 935 0 358 - 3 740 uIU/mL

## 2021-07-26 DIAGNOSIS — M54.6 CHRONIC RIGHT-SIDED THORACIC BACK PAIN: ICD-10-CM

## 2021-07-26 DIAGNOSIS — G89.29 CHRONIC RIGHT-SIDED THORACIC BACK PAIN: ICD-10-CM

## 2021-07-27 DIAGNOSIS — M54.6 CHRONIC RIGHT-SIDED THORACIC BACK PAIN: ICD-10-CM

## 2021-07-27 DIAGNOSIS — G89.29 CHRONIC RIGHT-SIDED THORACIC BACK PAIN: ICD-10-CM

## 2021-07-27 RX ORDER — OXYCODONE HYDROCHLORIDE AND ACETAMINOPHEN 5; 325 MG/1; MG/1
TABLET ORAL
Qty: 30 TABLET | Refills: 0
Start: 2021-07-27 | End: 2021-07-27 | Stop reason: SDUPTHER

## 2021-07-27 RX ORDER — OXYCODONE HYDROCHLORIDE AND ACETAMINOPHEN 5; 325 MG/1; MG/1
TABLET ORAL
Qty: 30 TABLET | Refills: 0 | Status: SHIPPED | OUTPATIENT
Start: 2021-07-27 | End: 2021-08-25 | Stop reason: SDUPTHER

## 2021-08-02 DIAGNOSIS — M54.2 CERVICALGIA: ICD-10-CM

## 2021-08-02 RX ORDER — TRAMADOL HYDROCHLORIDE 50 MG/1
TABLET ORAL
Qty: 30 TABLET | Refills: 0 | Status: SHIPPED | OUTPATIENT
Start: 2021-08-02 | End: 2021-08-30

## 2021-08-25 DIAGNOSIS — G89.29 CHRONIC RIGHT-SIDED THORACIC BACK PAIN: ICD-10-CM

## 2021-08-25 DIAGNOSIS — M54.6 CHRONIC RIGHT-SIDED THORACIC BACK PAIN: ICD-10-CM

## 2021-08-26 DIAGNOSIS — M50.20 CERVICAL HERNIATED DISC: Primary | ICD-10-CM

## 2021-08-26 RX ORDER — OXYCODONE HYDROCHLORIDE AND ACETAMINOPHEN 5; 325 MG/1; MG/1
TABLET ORAL
Qty: 30 TABLET | Refills: 0 | Status: SHIPPED | OUTPATIENT
Start: 2021-08-26 | End: 2021-09-24 | Stop reason: SDUPTHER

## 2021-08-29 DIAGNOSIS — M54.2 CERVICALGIA: ICD-10-CM

## 2021-08-30 RX ORDER — TRAMADOL HYDROCHLORIDE 50 MG/1
TABLET ORAL
Qty: 30 TABLET | Refills: 2 | Status: SHIPPED | OUTPATIENT
Start: 2021-08-30 | End: 2021-08-31 | Stop reason: SDUPTHER

## 2021-08-31 ENCOUNTER — TELEPHONE (OUTPATIENT)
Dept: FAMILY MEDICINE CLINIC | Facility: CLINIC | Age: 41
End: 2021-08-31

## 2021-08-31 DIAGNOSIS — M54.2 CERVICALGIA: ICD-10-CM

## 2021-08-31 RX ORDER — TRAMADOL HYDROCHLORIDE 50 MG/1
TABLET ORAL
Qty: 30 TABLET | Refills: 2 | Status: SHIPPED | OUTPATIENT
Start: 2021-08-31 | End: 2021-12-21 | Stop reason: SDUPTHER

## 2021-08-31 NOTE — TELEPHONE ENCOUNTER
Patient called needing rx for traMADol (ULTRAM) 50 mg tablet  She is on vacation and needs it sent to  [facility]  @ [phone]  She tried to get Rx transferred   However Mary Jo said it needs to be directly called in from

## 2021-09-18 DIAGNOSIS — E55.9 VITAMIN D DEFICIENCY: ICD-10-CM

## 2021-09-20 RX ORDER — ERGOCALCIFEROL 1.25 MG/1
CAPSULE ORAL
Qty: 8 CAPSULE | Refills: 4 | Status: SHIPPED | OUTPATIENT
Start: 2021-09-20 | End: 2022-05-12 | Stop reason: ALTCHOICE

## 2021-09-24 DIAGNOSIS — M54.6 CHRONIC RIGHT-SIDED THORACIC BACK PAIN: ICD-10-CM

## 2021-09-24 DIAGNOSIS — G89.29 CHRONIC RIGHT-SIDED THORACIC BACK PAIN: ICD-10-CM

## 2021-09-26 RX ORDER — OXYCODONE HYDROCHLORIDE AND ACETAMINOPHEN 5; 325 MG/1; MG/1
TABLET ORAL
Qty: 30 TABLET | Refills: 0 | Status: SHIPPED | OUTPATIENT
Start: 2021-09-26 | End: 2021-10-24 | Stop reason: SDUPTHER

## 2021-10-24 DIAGNOSIS — G89.29 CHRONIC RIGHT-SIDED THORACIC BACK PAIN: ICD-10-CM

## 2021-10-24 DIAGNOSIS — M54.6 CHRONIC RIGHT-SIDED THORACIC BACK PAIN: ICD-10-CM

## 2021-10-26 RX ORDER — OXYCODONE HYDROCHLORIDE AND ACETAMINOPHEN 5; 325 MG/1; MG/1
TABLET ORAL
Qty: 30 TABLET | Refills: 0 | Status: SHIPPED | OUTPATIENT
Start: 2021-10-26 | End: 2021-11-23 | Stop reason: SDUPTHER

## 2021-11-02 ENCOUNTER — TELEPHONE (OUTPATIENT)
Dept: FAMILY MEDICINE CLINIC | Facility: CLINIC | Age: 41
End: 2021-11-02

## 2021-11-18 ENCOUNTER — TELEMEDICINE (OUTPATIENT)
Dept: FAMILY MEDICINE CLINIC | Facility: CLINIC | Age: 41
End: 2021-11-18
Payer: COMMERCIAL

## 2021-11-18 DIAGNOSIS — B97.89 VIRAL SINUSITIS: Primary | ICD-10-CM

## 2021-11-18 DIAGNOSIS — J32.9 VIRAL SINUSITIS: Primary | ICD-10-CM

## 2021-11-18 PROCEDURE — 99212 OFFICE O/P EST SF 10 MIN: CPT | Performed by: STUDENT IN AN ORGANIZED HEALTH CARE EDUCATION/TRAINING PROGRAM

## 2021-11-19 RX ORDER — FLUTICASONE PROPIONATE 50 MCG
1 SPRAY, SUSPENSION (ML) NASAL DAILY
Qty: 18.2 ML | Refills: 1 | Status: SHIPPED | OUTPATIENT
Start: 2021-11-19 | End: 2022-02-14

## 2021-11-23 ENCOUNTER — TELEMEDICINE (OUTPATIENT)
Dept: FAMILY MEDICINE CLINIC | Facility: CLINIC | Age: 41
End: 2021-11-23
Payer: COMMERCIAL

## 2021-11-23 ENCOUNTER — TELEPHONE (OUTPATIENT)
Dept: FAMILY MEDICINE CLINIC | Facility: CLINIC | Age: 41
End: 2021-11-23

## 2021-11-23 DIAGNOSIS — J06.9 UPPER RESPIRATORY TRACT INFECTION, UNSPECIFIED TYPE: ICD-10-CM

## 2021-11-23 DIAGNOSIS — M54.6 CHRONIC RIGHT-SIDED THORACIC BACK PAIN: ICD-10-CM

## 2021-11-23 DIAGNOSIS — G89.29 CHRONIC RIGHT-SIDED THORACIC BACK PAIN: ICD-10-CM

## 2021-11-23 DIAGNOSIS — J01.10 ACUTE NON-RECURRENT FRONTAL SINUSITIS: Primary | ICD-10-CM

## 2021-11-23 PROCEDURE — 1036F TOBACCO NON-USER: CPT | Performed by: FAMILY MEDICINE

## 2021-11-23 PROCEDURE — 99213 OFFICE O/P EST LOW 20 MIN: CPT | Performed by: FAMILY MEDICINE

## 2021-11-23 RX ORDER — OXYCODONE HYDROCHLORIDE AND ACETAMINOPHEN 5; 325 MG/1; MG/1
TABLET ORAL
Qty: 30 TABLET | Refills: 0 | Status: SHIPPED | OUTPATIENT
Start: 2021-11-23 | End: 2021-12-21 | Stop reason: SDUPTHER

## 2021-11-24 PROCEDURE — 0241U HB NFCT DS VIR RESP RNA 4 TRGT: CPT | Performed by: STUDENT IN AN ORGANIZED HEALTH CARE EDUCATION/TRAINING PROGRAM

## 2021-11-26 ENCOUNTER — TELEMEDICINE (OUTPATIENT)
Dept: FAMILY MEDICINE CLINIC | Facility: CLINIC | Age: 41
End: 2021-11-26
Payer: COMMERCIAL

## 2021-11-26 DIAGNOSIS — J32.9 BACTERIAL SINUSITIS: Primary | ICD-10-CM

## 2021-11-26 DIAGNOSIS — B96.89 BACTERIAL SINUSITIS: Primary | ICD-10-CM

## 2021-11-26 PROCEDURE — 99213 OFFICE O/P EST LOW 20 MIN: CPT | Performed by: FAMILY MEDICINE

## 2021-11-26 RX ORDER — SACCHAROMYCES BOULARDII 250 MG
250 CAPSULE ORAL 2 TIMES DAILY
Qty: 30 CAPSULE | Refills: 0 | Status: SHIPPED | OUTPATIENT
Start: 2021-11-26 | End: 2022-05-28 | Stop reason: SDUPTHER

## 2021-11-26 RX ORDER — AMOXICILLIN AND CLAVULANATE POTASSIUM 875; 125 MG/1; MG/1
1 TABLET, FILM COATED ORAL EVERY 12 HOURS SCHEDULED
Qty: 14 TABLET | Refills: 0 | Status: SHIPPED | OUTPATIENT
Start: 2021-11-26 | End: 2021-12-03

## 2021-11-29 ENCOUNTER — TELEPHONE (OUTPATIENT)
Dept: FAMILY MEDICINE CLINIC | Facility: CLINIC | Age: 41
End: 2021-11-29

## 2021-12-09 ENCOUNTER — OFFICE VISIT (OUTPATIENT)
Dept: FAMILY MEDICINE CLINIC | Facility: CLINIC | Age: 41
End: 2021-12-09
Payer: COMMERCIAL

## 2021-12-09 VITALS
OXYGEN SATURATION: 97 % | BODY MASS INDEX: 21.97 KG/M2 | WEIGHT: 124 LBS | RESPIRATION RATE: 16 BRPM | SYSTOLIC BLOOD PRESSURE: 130 MMHG | DIASTOLIC BLOOD PRESSURE: 82 MMHG | HEART RATE: 95 BPM | HEIGHT: 63 IN | TEMPERATURE: 97.5 F

## 2021-12-09 DIAGNOSIS — K21.00 GASTROESOPHAGEAL REFLUX DISEASE WITH ESOPHAGITIS WITHOUT HEMORRHAGE: ICD-10-CM

## 2021-12-09 DIAGNOSIS — R53.83 FATIGUE, UNSPECIFIED TYPE: ICD-10-CM

## 2021-12-09 DIAGNOSIS — E55.9 VITAMIN D DEFICIENCY, UNSPECIFIED: ICD-10-CM

## 2021-12-09 DIAGNOSIS — M41.9 SCOLIOSIS OF THORACIC SPINE, UNSPECIFIED SCOLIOSIS TYPE: ICD-10-CM

## 2021-12-09 DIAGNOSIS — M50.20 CERVICAL HERNIATED DISC: ICD-10-CM

## 2021-12-09 DIAGNOSIS — I10 ESSENTIAL HYPERTENSION: Primary | ICD-10-CM

## 2021-12-09 DIAGNOSIS — D18.03 HEMANGIOMA OF LIVER: ICD-10-CM

## 2021-12-09 PROCEDURE — 3008F BODY MASS INDEX DOCD: CPT | Performed by: FAMILY MEDICINE

## 2021-12-09 PROCEDURE — 3079F DIAST BP 80-89 MM HG: CPT | Performed by: FAMILY MEDICINE

## 2021-12-09 PROCEDURE — 3075F SYST BP GE 130 - 139MM HG: CPT | Performed by: FAMILY MEDICINE

## 2021-12-09 PROCEDURE — 1036F TOBACCO NON-USER: CPT | Performed by: FAMILY MEDICINE

## 2021-12-09 PROCEDURE — 99214 OFFICE O/P EST MOD 30 MIN: CPT | Performed by: FAMILY MEDICINE

## 2021-12-09 RX ORDER — AZITHROMYCIN 250 MG/1
TABLET, FILM COATED ORAL
COMMUNITY
Start: 2021-11-20 | End: 2022-05-12 | Stop reason: ALTCHOICE

## 2021-12-18 ENCOUNTER — APPOINTMENT (OUTPATIENT)
Dept: LAB | Facility: HOSPITAL | Age: 41
End: 2021-12-18
Attending: FAMILY MEDICINE
Payer: COMMERCIAL

## 2021-12-18 DIAGNOSIS — R53.83 FATIGUE, UNSPECIFIED TYPE: ICD-10-CM

## 2021-12-18 DIAGNOSIS — E55.9 VITAMIN D DEFICIENCY, UNSPECIFIED: ICD-10-CM

## 2021-12-18 LAB
25(OH)D3 SERPL-MCNC: 32.5 NG/ML (ref 30–100)
BASOPHILS # BLD AUTO: 0.06 THOUSANDS/ΜL (ref 0–0.1)
BASOPHILS NFR BLD AUTO: 1 % (ref 0–1)
EOSINOPHIL # BLD AUTO: 0.1 THOUSAND/ΜL (ref 0–0.61)
EOSINOPHIL NFR BLD AUTO: 2 % (ref 0–6)
ERYTHROCYTE [DISTWIDTH] IN BLOOD BY AUTOMATED COUNT: 14.8 % (ref 11.6–15.1)
HCT VFR BLD AUTO: 36.5 % (ref 34.8–46.1)
HGB BLD-MCNC: 11.3 G/DL (ref 11.5–15.4)
IMM GRANULOCYTES # BLD AUTO: 0 THOUSAND/UL (ref 0–0.2)
IMM GRANULOCYTES NFR BLD AUTO: 0 % (ref 0–2)
LYMPHOCYTES # BLD AUTO: 1.74 THOUSANDS/ΜL (ref 0.6–4.47)
LYMPHOCYTES NFR BLD AUTO: 39 % (ref 14–44)
MCH RBC QN AUTO: 28.1 PG (ref 26.8–34.3)
MCHC RBC AUTO-ENTMCNC: 31 G/DL (ref 31.4–37.4)
MCV RBC AUTO: 91 FL (ref 82–98)
MONOCYTES # BLD AUTO: 0.48 THOUSAND/ΜL (ref 0.17–1.22)
MONOCYTES NFR BLD AUTO: 11 % (ref 4–12)
NEUTROPHILS # BLD AUTO: 2.07 THOUSANDS/ΜL (ref 1.85–7.62)
NEUTS SEG NFR BLD AUTO: 47 % (ref 43–75)
NRBC BLD AUTO-RTO: 0 /100 WBCS
PLATELET # BLD AUTO: 271 THOUSANDS/UL (ref 149–390)
PMV BLD AUTO: 9.2 FL (ref 8.9–12.7)
RBC # BLD AUTO: 4.02 MILLION/UL (ref 3.81–5.12)
TSH SERPL DL<=0.05 MIU/L-ACNC: 0.57 UIU/ML (ref 0.36–3.74)
WBC # BLD AUTO: 4.45 THOUSAND/UL (ref 4.31–10.16)

## 2021-12-18 PROCEDURE — 36415 COLL VENOUS BLD VENIPUNCTURE: CPT

## 2021-12-18 PROCEDURE — 85025 COMPLETE CBC W/AUTO DIFF WBC: CPT

## 2021-12-18 PROCEDURE — 82306 VITAMIN D 25 HYDROXY: CPT

## 2021-12-18 PROCEDURE — 84443 ASSAY THYROID STIM HORMONE: CPT

## 2021-12-21 DIAGNOSIS — M54.6 CHRONIC RIGHT-SIDED THORACIC BACK PAIN: ICD-10-CM

## 2021-12-21 DIAGNOSIS — G89.29 CHRONIC RIGHT-SIDED THORACIC BACK PAIN: ICD-10-CM

## 2021-12-21 DIAGNOSIS — M54.2 CERVICALGIA: ICD-10-CM

## 2021-12-23 RX ORDER — OXYCODONE HYDROCHLORIDE AND ACETAMINOPHEN 5; 325 MG/1; MG/1
TABLET ORAL
Qty: 30 TABLET | Refills: 0 | Status: SHIPPED | OUTPATIENT
Start: 2021-12-23 | End: 2021-12-23

## 2021-12-23 RX ORDER — OXYCODONE HYDROCHLORIDE AND ACETAMINOPHEN 5; 325 MG/1; MG/1
TABLET ORAL
Qty: 20 TABLET | Refills: 0 | Status: SHIPPED | OUTPATIENT
Start: 2021-12-23 | End: 2022-01-22 | Stop reason: SDUPTHER

## 2021-12-23 RX ORDER — TRAMADOL HYDROCHLORIDE 50 MG/1
TABLET ORAL
Qty: 30 TABLET | OUTPATIENT
Start: 2021-12-23

## 2021-12-23 RX ORDER — TRAMADOL HYDROCHLORIDE 50 MG/1
TABLET ORAL
Qty: 30 TABLET | Refills: 0 | Status: SHIPPED | OUTPATIENT
Start: 2021-12-23 | End: 2021-12-23

## 2021-12-23 RX ORDER — TRAMADOL HYDROCHLORIDE 50 MG/1
TABLET ORAL
Qty: 21 TABLET | Refills: 0 | Status: SHIPPED | OUTPATIENT
Start: 2021-12-23 | End: 2022-01-22 | Stop reason: SDUPTHER

## 2021-12-24 DIAGNOSIS — G89.29 CHRONIC BILATERAL LOW BACK PAIN WITHOUT SCIATICA: ICD-10-CM

## 2021-12-24 DIAGNOSIS — M54.50 CHRONIC BILATERAL LOW BACK PAIN WITHOUT SCIATICA: ICD-10-CM

## 2021-12-27 RX ORDER — NAPROXEN 500 MG/1
TABLET ORAL
Qty: 60 TABLET | Refills: 5 | Status: SHIPPED | OUTPATIENT
Start: 2021-12-27 | End: 2022-05-18 | Stop reason: SDUPTHER

## 2022-01-18 DIAGNOSIS — L70.0 ACNE VULGARIS: ICD-10-CM

## 2022-01-18 RX ORDER — DAPSONE 50 MG/G
GEL TOPICAL
Qty: 90 G | Refills: 0 | Status: SHIPPED | OUTPATIENT
Start: 2022-01-18 | End: 2022-02-14

## 2022-01-21 ENCOUNTER — OFFICE VISIT (OUTPATIENT)
Dept: FAMILY MEDICINE CLINIC | Facility: CLINIC | Age: 42
End: 2022-01-21
Payer: COMMERCIAL

## 2022-01-21 ENCOUNTER — NURSE TRIAGE (OUTPATIENT)
Dept: PHYSICAL THERAPY | Facility: OTHER | Age: 42
End: 2022-01-21

## 2022-01-21 VITALS
WEIGHT: 119.9 LBS | SYSTOLIC BLOOD PRESSURE: 116 MMHG | HEIGHT: 63 IN | RESPIRATION RATE: 18 BRPM | OXYGEN SATURATION: 100 % | TEMPERATURE: 97.9 F | HEART RATE: 87 BPM | DIASTOLIC BLOOD PRESSURE: 80 MMHG | BODY MASS INDEX: 21.25 KG/M2

## 2022-01-21 DIAGNOSIS — G89.29 CHRONIC NECK AND BACK PAIN: Primary | ICD-10-CM

## 2022-01-21 DIAGNOSIS — M50.20 CERVICAL HERNIATED DISC: Primary | ICD-10-CM

## 2022-01-21 DIAGNOSIS — M54.9 CHRONIC NECK AND BACK PAIN: Primary | ICD-10-CM

## 2022-01-21 DIAGNOSIS — M54.6 CHRONIC RIGHT-SIDED THORACIC BACK PAIN: ICD-10-CM

## 2022-01-21 DIAGNOSIS — G89.29 OTHER CHRONIC PAIN: ICD-10-CM

## 2022-01-21 DIAGNOSIS — M54.2 CHRONIC NECK AND BACK PAIN: Primary | ICD-10-CM

## 2022-01-21 DIAGNOSIS — G89.29 CHRONIC RIGHT-SIDED THORACIC BACK PAIN: ICD-10-CM

## 2022-01-21 PROCEDURE — 99214 OFFICE O/P EST MOD 30 MIN: CPT | Performed by: STUDENT IN AN ORGANIZED HEALTH CARE EDUCATION/TRAINING PROGRAM

## 2022-01-21 PROCEDURE — 80307 DRUG TEST PRSMV CHEM ANLYZR: CPT | Performed by: STUDENT IN AN ORGANIZED HEALTH CARE EDUCATION/TRAINING PROGRAM

## 2022-01-21 PROCEDURE — 3074F SYST BP LT 130 MM HG: CPT | Performed by: STUDENT IN AN ORGANIZED HEALTH CARE EDUCATION/TRAINING PROGRAM

## 2022-01-21 PROCEDURE — 3008F BODY MASS INDEX DOCD: CPT | Performed by: STUDENT IN AN ORGANIZED HEALTH CARE EDUCATION/TRAINING PROGRAM

## 2022-01-21 PROCEDURE — 3079F DIAST BP 80-89 MM HG: CPT | Performed by: STUDENT IN AN ORGANIZED HEALTH CARE EDUCATION/TRAINING PROGRAM

## 2022-01-21 RX ORDER — NALOXONE HYDROCHLORIDE 4 MG/.1ML
1 SPRAY NASAL ONCE
Qty: 1 EACH | Refills: 1 | Status: SHIPPED | OUTPATIENT
Start: 2022-01-21 | End: 2022-05-28 | Stop reason: SDUPTHER

## 2022-01-21 NOTE — PROGRESS NOTES
[place]elin [place] 1980 female MRN: [id number]    Family Medicine Acute Visit    ASSESSMENT/PLAN  There are no diagnoses linked to this encounter  1  Cervical herniated disc  Chronic, unresolved, pt given refill for pain control  UDS and  congruent with pt being out of medications  Pt resigned pain contract until can be managed by pain clinic    - Ambulatory Referral to Comprehensive Spine Program; Future  - Toxicology screen, urine  - traMADol (ULTRAM) 50 mg tablet; 1 tablet daily  Dispense: 14 tablet; Refill: 0    2  Other chronic pain  Chronic, unresolved, pt given refill for pain control  UDS and  congruent with pt being out of medications  Pt resigned pain contract until can be managed by pain clinic    - naloxone (NARCAN) 4 mg/0 1 mL nasal spray; 0 1 mL (4 mg total) into each nostril once for 1 dose If the desired response is not obtained after 2-3 minutes, administer an additional dose using a new spray  Dispense: 1 each; Refill: 1  - Toxicology screen, urine    3  Chronic right-sided thoracic back pain  [facility]  [facility]  Please see above for pain managmentSee above  - oxyCODONE-acetaminophen (PERCOCET) 5-325 mg per tablet; 1 tablet daily for back pain  Dispense: 30 tablet; Refill: 0           Future Appointments   Date Time Provider Jean Edmonds   2/5/2022  8:15 AM [place] 1 [place] [facility]   2/5/2022  9:00 AM [place] 1 [place] [facility]          SUBJECTIVE  CC: Medication Management (going to be following with pain mgmt, Dr Ashli Rice  Needs refills on medication, okay with decrease in tramadol, hasl been taking 25mg for past week or so, splitting pills in half)      HPI:  Karla Montenegro is a 46 y o  female who presents for to tranistition of care with new provider    Pain meds, pt stated that she has weaned down on her pain meds and is [facility] pt stated that she has her MRI rescheduled 2/5/22   Pt stated provider was not sure if it was a lesion going on that is causing the pain she has  Pt stated that sheher spinal conditions continue to contribute to her pain  Pt stated that she has not been to PT in a year but did have benefits when she did go  Pt stated that she is starting to get tighter in her shoulders, back and neck  Pt stated that she wants to restart her OMT because it was beneficial and would possibly like to go back to PT  Pt stated that she is going to have the imaging to r/o myleopathy  Pt stated that she ran out of her tramadol last week by half to wean down to eventually wean off  Pt stated that she ran out of her percocet last weekend and has not been on her meds for about 6 days, pt stated that she does not have any withdrawals symptoms    Review of Systems   Constitutional: Negative for chills and fever  HENT: Negative for ear pain and sore throat  Eyes: Negative for pain and visual disturbance  Respiratory: Negative for cough and shortness of breath  Cardiovascular: Negative for chest pain and palpitations  Gastrointestinal: Negative for abdominal pain and vomiting  Genitourinary: Negative for dysuria and hematuria  Musculoskeletal: Positive for back pain, myalgias (upper arms both sides), neck pain and neck stiffness  Negative for arthralgias  Pins and needles both hands   Skin: Negative for color change and rash  Neurological: Negative for seizures, syncope and weakness  Psychiatric/Behavioral: The patient is nervous/anxious  All other systems reviewed and are negative        Historical Information   The patient history was reviewed as follows:  Past Medical History:   Diagnosis Date    Chronic fatigue     last assessed 9/27/16    Endometriosis     Hypertension     Hypoglycemia     Liver lesion     last assessed 10/2/15    Migraine     Ovarian cyst     Scoliosis     last assessed  10/21/13    Varicella     childhood         Past Surgical History:   Procedure Laterality Date     SECTION      x2    ECTOPIC PREGNANCY SURGERY      LAPAROSCOPY      exploratory    MO  DELIVERY ONLY N/A 3/26/2020    Procedure:  SECTION () REPEAT;  Surgeon: Shirley Yun MD;  Location: [facility] ;  Service: Obstetrics    SALPINGECTOMY       Family History   Problem Relation Age of Onset    Hypertension Mother     Cancer Mother         skin    Hyperlipidemia Mother     Hyperlipidemia Father     Cancer Maternal Grandfather         throat, oral, lung    Stroke Maternal Grandfather     Hypertension Sister     Heart disease Sister         MVP    Other Daughter         sickle cell trait    Diabetes Maternal Grandmother         type 2    Heart disease Maternal Grandmother     Cancer Paternal Grandmother         brain    Heart disease Paternal Grandmother         MI    Other Paternal Grandfather     Lung cancer Paternal Grandfather         dementia    Cancer Maternal Uncle         lung, prostate    Other Maternal Uncle         agent orange exp , smoker    Alcohol abuse Maternal Uncle       Social History   Social History     Substance and Sexual Activity   Alcohol Use Yes    Comment: once a year     Social History     Substance and Sexual Activity   Drug Use No     Social History     Tobacco Use   Smoking Status Former Smoker   Smokeless Tobacco Never Used       Medications:     Current Outpatient Medications:     acetaminophen (Tylenol) 325 mg tablet, Take 1 tablet (325 mg total) by mouth every 6 (six) hours as needed for mild pain, Disp: 90 tablet, Rfl: 3    carvedilol (COREG) 6 25 mg tablet, Take 6 25 mg by mouth, Disp: , Rfl:     Dapsone 5 % topical gel, Apply BID (Patient taking differently: Apply daily ), Disp: 90 g, Rfl: 0    ergocalciferol (VITAMIN D2) 50,000 units, TAKE 1 CAPSULE BY MOUTH 1 TIME A WEEK, Disp: 8 capsule, Rfl: 4    hydrOXYzine HCL (ATARAX) 25 mg tablet, TAKE 1 TABLET(25 MG) BY MOUTH DAILY AT BEDTIME (Patient taking differently: 25 mg daily at bedtime as needed  ), Disp: 30 tablet, Rfl: 11    lisinopril (ZESTRIL) 10 mg tablet, , Disp: , Rfl:     naproxen (NAPROSYN) 500 mg tablet, TAKE 1 TABLET(500 MG) BY MOUTH TWICE DAILY WITH MEALS (Patient taking differently: 2 (two) times a day as needed  ), Disp: 60 tablet, Rfl: 5    Omega-3 Fatty Acids (FISH OIL) 1,000 mg, Take 1,000 mg by mouth daily, Disp: , Rfl:     oxyCODONE-acetaminophen (PERCOCET) 5-325 mg per tablet, 1 tablet daily for back pain, Disp: 30 tablet, Rfl: 0    traMADol (ULTRAM) 50 mg tablet, 1 tablet daily, Disp: 14 tablet, Rfl: 0    zolpidem (AMBIEN) 5 mg tablet, Take 0 5 tablets (2 5 mg total) by mouth daily at bedtime as needed for sleep, Disp: 30 tablet, Rfl: 1    azithromycin (ZITHROMAX) 250 mg tablet, , Disp: , Rfl:     butalbital-acetaminophen-caffeine (FIORICET,ESGIC) -40 mg per tablet, Take 1 tablet by mouth every 4 (four) hours as needed for headaches (Patient not taking: Reported on 12/7/2020), Disp: 5 tablet, Rfl: 0    fluticasone (FLONASE) 50 mcg/act nasal spray, 1 spray into each nostril daily, Disp: 18 2 mL, Rfl: 1    labetalol (NORMODYNE) 100 mg tablet, Take 1 tablet (100 mg total) by mouth 2 (two) times a day (Patient not taking: Reported on 12/7/2020), Disp: 60 tablet, Rfl: 2    labetalol (NORMODYNE) 200 mg tablet, Take 1 tablet (200 mg total) by mouth 2 (two) times a day, Disp: 180 tablet, Rfl: 3    lisinopril (ZESTRIL) 10 mg tablet, Take 1 tablet (10 mg total) by mouth 2 (two) times a day, Disp: 120 tablet, Rfl: 3    naloxone (NARCAN) 4 mg/0 1 mL nasal spray, 0 1 mL (4 mg total) into each nostril once for 1 dose If the desired response is not obtained after 2-3 minutes, administer an additional dose using a new spray, Disp: 1 each, Rfl: 1    Prenatal Vit-Fe Fumarate-FA (PRENATAL VITAMIN PLUS LOW IRON) 27-1 MG TABS, Take 1 tablet by mouth daily, Disp: , Rfl:     saccharomyces boulardii (FLORASTOR) 250 mg capsule, Take 1 capsule (250 mg total) by mouth 2 (two) times a day Take 4 hours after antibiotic, Disp: 30 capsule, Rfl: 0    Allergies   Allergen Reactions    Cephalexin      Chest pain    Levofloxacin Diarrhea    Sulfa Antibiotics Hives     With swelling       OBJECTIVE  Vitals:   Vitals:    01/21/22 1132   BP: 116/80   Pulse: 87   Resp: 18   Temp: 97 9 °F (36 6 °C)   TempSrc: Tympanic   SpO2: 100%   Weight: 54 4 kg (119 lb 14 4 oz)   Height: 5' 3" (1 6 m)         Physical Exam  Vitals reviewed  Constitutional:       General: She is not in acute distress  Appearance: Normal appearance  HENT:      Head: Atraumatic  Nose: No congestion  Eyes:      General: No scleral icterus  Pupils: Pupils are equal, round, and reactive to light  Neck:      Comments: spurling negative, DROM bilaterally  Cardiovascular:      Rate and Rhythm: Normal rate  Heart sounds: No murmur heard  Pulmonary:      Effort: No respiratory distress  Breath sounds: No wheezing, rhonchi or rales  Abdominal:      Palpations: Abdomen is soft  Tenderness: There is no abdominal tenderness  There is no guarding  Musculoskeletal:         General: No swelling or deformity  Normal range of motion  Cervical back: No rigidity or tenderness  Right lower leg: No edema  Left lower leg: No edema  Right foot: Normal range of motion  Left foot: Normal range of motion  Feet:      Right foot:      Protective Sensation: 10 sites tested  Skin integrity: No ulcer, skin breakdown or callus  Toenail Condition: Right toenails are normal       Left foot:      Protective Sensation: 10 sites tested  Skin integrity: No ulcer, skin breakdown or callus  Toenail Condition: Left toenails are normal    Skin:     General: Skin is warm  Capillary Refill: Capillary refill takes more than 3 seconds  Findings: No lesion or rash  Neurological:      General: No focal deficit present        Mental Status: She is oriented to person, place, and time  Cranial Nerves: No cranial nerve deficit  Coordination: Coordination normal    Psychiatric:         Mood and Affect: Mood normal          Thought Content:  Thought content normal                     Jenny Hopkins MD  [facility]   1/22/2022

## 2022-01-21 NOTE — TELEPHONE ENCOUNTER
Additional Information   Negative: Is this related to a work injury?  Negative: Is this related to an MVA?  Negative: Are you currently recieving homecare services?  Negative: Has the patient had unexplained weight loss?  Negative: Does the patient have a fever?  Negative: Is the patient experiencing blood in sputum?  Negative: Is the patient experiencing urine retention?  Negative: Is the patient experiencing acute drop foot or paralysis?  Negative: Has the patient experienced major trauma? (fall from height, high speed collision, direct blow to spine) and is also experiencing nausea, light-headedness, or loss of consciousness?  Affirmative: Is this a chronic condition? Background - Initial Assessment  Clinical complaint: Thoracic back and neck pain  States she sometimes gets numbness and tingling in the fingers of both hands  States pain has been present for 5-10 years  Did PT in the past and is being seen by a Pain Management group  Date of onset: 5-10 yrs  Frequency of pain: constant  Quality of pain: aching, burning and throbbing    Protocols used: [facility] COMPREHENSIVE SPINE PROGRAM PROTOCOL    This RN did review in detail the Comprehensive Spine Program and what we can provide for their back and neck pain  Patient is agreeable to being triaged by this RN and would like to proceed with Physical Therapy  Referral was placed for Physical Therapy at the [facility] site  Patients information was sent to the  to make evaluation appointment  Patient made aware that the PT office  will be calling to schedule the appointment  Patient was provided with the phone number to the PT office  No further questions and/or concerns were voiced by the patient at this time  Patient states understanding of the referral that was placed  Referral Closed

## 2022-01-21 NOTE — PATIENT INSTRUCTIONS

## 2022-01-22 LAB
AMPHETAMINES UR QL SCN: NEGATIVE NG/ML
BARBITURATES UR QL SCN: NEGATIVE NG/ML
BENZODIAZ UR QL: NEGATIVE NG/ML
BZE UR QL: NEGATIVE NG/ML
CANNABINOIDS UR QL SCN: NEGATIVE NG/ML
METHADONE UR QL SCN: NEGATIVE NG/ML
OPIATES UR QL: NEGATIVE NG/ML
PCP UR QL: NEGATIVE NG/ML
PROPOXYPH UR QL SCN: NEGATIVE NG/ML

## 2022-01-22 RX ORDER — TRAMADOL HYDROCHLORIDE 50 MG/1
TABLET ORAL
Qty: 14 TABLET | Refills: 0 | Status: SHIPPED | OUTPATIENT
Start: 2022-01-22 | End: 2022-05-12 | Stop reason: ALTCHOICE

## 2022-01-22 RX ORDER — OXYCODONE HYDROCHLORIDE AND ACETAMINOPHEN 5; 325 MG/1; MG/1
TABLET ORAL
Qty: 30 TABLET | Refills: 0 | Status: SHIPPED | OUTPATIENT
Start: 2022-01-22 | End: 2022-02-21 | Stop reason: SDUPTHER

## 2022-02-02 ENCOUNTER — OFFICE VISIT (OUTPATIENT)
Dept: PODIATRY | Facility: CLINIC | Age: 42
End: 2022-02-02
Payer: COMMERCIAL

## 2022-02-02 VITALS
RESPIRATION RATE: 16 BRPM | WEIGHT: 119 LBS | SYSTOLIC BLOOD PRESSURE: 116 MMHG | HEIGHT: 63 IN | BODY MASS INDEX: 21.09 KG/M2 | DIASTOLIC BLOOD PRESSURE: 80 MMHG

## 2022-02-02 DIAGNOSIS — M79.672 LEFT FOOT PAIN: Primary | ICD-10-CM

## 2022-02-02 DIAGNOSIS — M20.11 VALGUS DEFORMITY OF BOTH GREAT TOES: ICD-10-CM

## 2022-02-02 DIAGNOSIS — L03.032 PARONYCHIA OF TOENAIL OF LEFT FOOT: ICD-10-CM

## 2022-02-02 DIAGNOSIS — B35.1 ONYCHOMYCOSIS: ICD-10-CM

## 2022-02-02 DIAGNOSIS — M20.12 VALGUS DEFORMITY OF BOTH GREAT TOES: ICD-10-CM

## 2022-02-02 PROCEDURE — 99202 OFFICE O/P NEW SF 15 MIN: CPT | Performed by: PODIATRIST

## 2022-02-02 RX ORDER — TERBINAFINE HYDROCHLORIDE 250 MG/1
250 TABLET ORAL DAILY
Qty: 30 TABLET | Refills: 0 | Status: SHIPPED | OUTPATIENT
Start: 2022-02-02 | End: 2022-03-04

## 2022-02-02 NOTE — PROGRESS NOTES
Assessment/Plan:  Pain upon ambulation  Left hallux paronychia  Mycosis left hallux nail  Hallux valgus deformity bilateral    Plan  Foot exam performed  Patient advised on condition  Patient be started on empiric course of terbinafine  Patient is not pregnant  She will practice birth control for the next month  She will spray her shoes with Lysol  In addition she will take biotin  She is considering bunion surgery in the future  Diagnoses and all orders for this visit:    Left foot pain    Onychomycosis  -     terbinafine (LamISIL) 250 mg tablet; Take 1 tablet (250 mg total) by mouth daily    Paronychia of toenail of left foot    Valgus deformity of both great toes          Subjective:  Patient is concerned with discoloration of her left big toe toenail  She gets pedicures  No history of trauma    She also has painful bunions bilateral    Allergies   Allergen Reactions    Cephalexin      Chest pain    Levofloxacin Diarrhea    Sulfa Antibiotics Hives     With swelling         Current Outpatient Medications:     acetaminophen (Tylenol) 325 mg tablet, Take 1 tablet (325 mg total) by mouth every 6 (six) hours as needed for mild pain, Disp: 90 tablet, Rfl: 3    azithromycin (ZITHROMAX) 250 mg tablet, , Disp: , Rfl:     butalbital-acetaminophen-caffeine (FIORICET,ESGIC) -40 mg per tablet, Take 1 tablet by mouth every 4 (four) hours as needed for headaches (Patient not taking: Reported on 12/7/2020), Disp: 5 tablet, Rfl: 0    carvedilol (COREG) 6 25 mg tablet, Take 6 25 mg by mouth, Disp: , Rfl:     Dapsone 5 % topical gel, Apply BID (Patient taking differently: Apply daily ), Disp: 90 g, Rfl: 0    ergocalciferol (VITAMIN D2) 50,000 units, TAKE 1 CAPSULE BY MOUTH 1 TIME A WEEK, Disp: 8 capsule, Rfl: 4    fluticasone (FLONASE) 50 mcg/act nasal spray, 1 spray into each nostril daily, Disp: 18 2 mL, Rfl: 1    hydrOXYzine HCL (ATARAX) 25 mg tablet, TAKE 1 TABLET(25 MG) BY MOUTH DAILY AT BEDTIME (Patient taking differently: 25 mg daily at bedtime as needed  ), Disp: 30 tablet, Rfl: 11    labetalol (NORMODYNE) 100 mg tablet, Take 1 tablet (100 mg total) by mouth 2 (two) times a day (Patient not taking: Reported on 12/7/2020), Disp: 60 tablet, Rfl: 2    labetalol (NORMODYNE) 200 mg tablet, Take 1 tablet (200 mg total) by mouth 2 (two) times a day, Disp: 180 tablet, Rfl: 3    lisinopril (ZESTRIL) 10 mg tablet, , Disp: , Rfl:     lisinopril (ZESTRIL) 10 mg tablet, Take 1 tablet (10 mg total) by mouth 2 (two) times a day, Disp: 120 tablet, Rfl: 3    naloxone (NARCAN) 4 mg/0 1 mL nasal spray, 0 1 mL (4 mg total) into each nostril once for 1 dose If the desired response is not obtained after 2-3 minutes, administer an additional dose using a new spray, Disp: 1 each, Rfl: 1    naproxen (NAPROSYN) 500 mg tablet, TAKE 1 TABLET(500 MG) BY MOUTH TWICE DAILY WITH MEALS (Patient taking differently: 2 (two) times a day as needed  ), Disp: 60 tablet, Rfl: 5    Omega-3 Fatty Acids (FISH OIL) 1,000 mg, Take 1,000 mg by mouth daily, Disp: , Rfl:     oxyCODONE-acetaminophen (PERCOCET) 5-325 mg per tablet, 1 tablet daily for back pain, Disp: 30 tablet, Rfl: 0    Prenatal Vit-Fe Fumarate-FA (PRENATAL VITAMIN PLUS LOW IRON) 27-1 MG TABS, Take 1 tablet by mouth daily, Disp: , Rfl:     saccharomyces boulardii (FLORASTOR) 250 mg capsule, Take 1 capsule (250 mg total) by mouth 2 (two) times a day Take 4 hours after antibiotic, Disp: 30 capsule, Rfl: 0    terbinafine (LamISIL) 250 mg tablet, Take 1 tablet (250 mg total) by mouth daily, Disp: 30 tablet, Rfl: 0    traMADol (ULTRAM) 50 mg tablet, 1 tablet daily, Disp: 14 tablet, Rfl: 0    zolpidem (AMBIEN) 5 mg tablet, Take 0 5 tablets (2 5 mg total) by mouth daily at bedtime as needed for sleep, Disp: 30 tablet, Rfl: 1    Patient Active Problem List   Diagnosis    Essential hypertension    Tachycardia    Abnormal EKG    Scoliosis    Panic attacks    Acquired ankle/foot deformity    Mass of breast    Cervical herniated disc    Endometriosis    Generalized anxiety disorder    GERD (gastroesophageal reflux disease)    Hemangioma of liver    Vitamin D deficiency    Family history of sickle cell trait    History of  delivery    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    History of placenta abruption    Premature atrial complexes    Family history of congenital heart defect    S/P repeat low transverse           Patient ID: Stan Davis is a 46 y o  female  HPI    The following portions of the patient's history were reviewed and updated as appropriate:     family history includes Alcohol abuse in her maternal uncle; Cancer in her maternal grandfather, maternal uncle, mother, and paternal grandmother; Diabetes in her maternal grandmother; Heart disease in her maternal grandmother, paternal grandmother, and sister; Hyperlipidemia in her father and mother; Hypertension in her mother and sister; Lung cancer in her paternal grandfather; Other in her daughter, maternal uncle, and paternal grandfather; Stroke in her maternal grandfather  reports that she has quit smoking  She has never used smokeless tobacco  She reports current alcohol use  She reports that she does not use drugs  Vitals:    22 1021   BP: 116/80   Resp: 16       Review of Systems      Objective:  Patient's shoes and socks removed     Foot Exam    General  General Appearance: appears stated age and healthy   Orientation: alert and oriented to person, place, and time   Affect: appropriate       Right Foot/Ankle     Inspection and Palpation  Tenderness: great toe metatarsophalangeal joint   Swelling: none   Arch: pes planus  Hallux valgus: yes    Neurovascular  Dorsalis pedis: 3+  Posterior tibial: 3+      Left Foot/Ankle      Inspection and Palpation  Tenderness: great toe metatarsophalangeal joint   Swelling: none   Arch: pes planus  Hallux valgus: yes    Neurovascular  Dorsalis pedis: 3+  Posterior tibial: 3+        Physical Exam  Vitals and nursing note reviewed  Cardiovascular:      Rate and Rhythm: Normal rate and regular rhythm  Pulses:           Dorsalis pedis pulses are 3+ on the right side and 3+ on the left side  Posterior tibial pulses are 3+ on the right side and 3+ on the left side  Musculoskeletal:      Right foot: Bunion present  Left foot: Bunion present  Skin:     Capillary Refill: Capillary refill takes less than 2 seconds  Comments: All nails are mildly dystrophic  Left hallux demonstrates subungual mycosis mid nail plate  Negative evidence of occult lysis or abscess  Psychiatric:         Mood and Affect: Mood normal          Behavior: Behavior normal          Thought Content:  Thought content normal          Judgment: Judgment normal

## 2022-02-05 ENCOUNTER — HOSPITAL ENCOUNTER (OUTPATIENT)
Dept: RADIOLOGY | Facility: HOSPITAL | Age: 42
Discharge: HOME | End: 2022-02-05
Attending: PHYSICAL MEDICINE & REHABILITATION

## 2022-02-05 DIAGNOSIS — M54.2 NECK PAIN: ICD-10-CM

## 2022-02-05 DIAGNOSIS — G89.29 CHRONIC THORACIC BACK PAIN, UNSPECIFIED BACK PAIN LATERALITY: ICD-10-CM

## 2022-02-05 DIAGNOSIS — M41.24 OTHER IDIOPATHIC SCOLIOSIS, THORACIC REGION: ICD-10-CM

## 2022-02-05 DIAGNOSIS — G12.29 UPPER MOTOR NEURON LESION (HCC): ICD-10-CM

## 2022-02-05 DIAGNOSIS — M54.6 CHRONIC THORACIC BACK PAIN, UNSPECIFIED BACK PAIN LATERALITY: ICD-10-CM

## 2022-02-13 DIAGNOSIS — B97.89 VIRAL SINUSITIS: ICD-10-CM

## 2022-02-13 DIAGNOSIS — J32.9 VIRAL SINUSITIS: ICD-10-CM

## 2022-02-14 RX ORDER — FLUTICASONE PROPIONATE 50 MCG
SPRAY, SUSPENSION (ML) NASAL
Qty: 16 G | Refills: 0 | Status: SHIPPED | OUTPATIENT
Start: 2022-02-14 | End: 2022-05-12 | Stop reason: ALTCHOICE

## 2022-02-21 DIAGNOSIS — G89.29 CHRONIC RIGHT-SIDED THORACIC BACK PAIN: ICD-10-CM

## 2022-02-21 DIAGNOSIS — M54.6 CHRONIC RIGHT-SIDED THORACIC BACK PAIN: ICD-10-CM

## 2022-02-21 DIAGNOSIS — F41.0 PANIC ATTACKS: Primary | ICD-10-CM

## 2022-02-22 RX ORDER — ALPRAZOLAM 0.25 MG/1
TABLET ORAL
Qty: 1 TABLET | Refills: 0 | Status: SHIPPED | OUTPATIENT
Start: 2022-02-22 | End: 2022-05-12 | Stop reason: ALTCHOICE

## 2022-02-22 RX ORDER — OXYCODONE HYDROCHLORIDE AND ACETAMINOPHEN 5; 325 MG/1; MG/1
TABLET ORAL
Qty: 30 TABLET | Refills: 0 | Status: SHIPPED | OUTPATIENT
Start: 2022-02-22 | End: 2022-04-06 | Stop reason: SDUPTHER

## 2022-02-22 NOTE — TELEPHONE ENCOUNTER
Patient is requesting status on her refill  Also patient went for her MRI but lost it when they put her in the tube  Patient rescheduled her MRI to 3/15 and wanted to know if there is something that Dr Tim Medina would recommend for patient to take or do before her MRI on the 15th  Patient doesn't like to take anxiety meds  Please advise

## 2022-03-15 ENCOUNTER — HOSPITAL ENCOUNTER (OUTPATIENT)
Dept: RADIOLOGY | Facility: HOSPITAL | Age: 42
End: 2022-03-15
Attending: PHYSICAL MEDICINE & REHABILITATION
Payer: COMMERCIAL

## 2022-03-21 DIAGNOSIS — F51.01 PRIMARY INSOMNIA: ICD-10-CM

## 2022-03-22 DIAGNOSIS — G89.29 CHRONIC RIGHT-SIDED THORACIC BACK PAIN: ICD-10-CM

## 2022-03-22 DIAGNOSIS — L70.0 ACNE VULGARIS: ICD-10-CM

## 2022-03-22 DIAGNOSIS — M54.6 CHRONIC RIGHT-SIDED THORACIC BACK PAIN: ICD-10-CM

## 2022-03-22 RX ORDER — ZOLPIDEM TARTRATE 5 MG/1
2.5 TABLET ORAL
Qty: 15 TABLET | Refills: 0 | Status: SHIPPED | OUTPATIENT
Start: 2022-03-22 | End: 2022-04-25

## 2022-03-22 NOTE — TELEPHONE ENCOUNTER
Patient has been seen by multiple providers here in the clinic  She has multiple controlled medications and some in the same class of medication which may lead to significant adverse effect not accounting for interactions between classes  She will need to have a PCP appointment to review all medications, update controlled substance contract, update urine drug screen, discuss concrete plan for weaning off those medications and setting expectations

## 2022-03-24 RX ORDER — DAPSONE 50 MG/G
GEL TOPICAL
Qty: 90 G | Refills: 0 | Status: SHIPPED | OUTPATIENT
Start: 2022-03-24 | End: 2022-05-17

## 2022-03-24 RX ORDER — OXYCODONE HYDROCHLORIDE AND ACETAMINOPHEN 5; 325 MG/1; MG/1
TABLET ORAL
Qty: 30 TABLET | Refills: 0 | OUTPATIENT
Start: 2022-03-24

## 2022-03-24 NOTE — TELEPHONE ENCOUNTER
Please call patient  Patient needs to have appointment before percocet refill can be sent  If patient wants refill before her next appointment on 4/5 she needs to be seen sooner in the office  Please schedule patient for next available appointment for any provider  Per Dr Steve Garcia please cancel her appointment in July, appointment is too far out

## 2022-03-28 DIAGNOSIS — B35.1 ONYCHOMYCOSIS: Primary | ICD-10-CM

## 2022-03-28 RX ORDER — KETOCONAZOLE 20 MG/G
CREAM TOPICAL DAILY
Qty: 60 G | Refills: 1 | Status: SHIPPED | OUTPATIENT
Start: 2022-03-28 | End: 2022-05-28 | Stop reason: SDUPTHER

## 2022-03-31 ENCOUNTER — OFFICE VISIT (OUTPATIENT)
Dept: FAMILY MEDICINE CLINIC | Facility: CLINIC | Age: 42
End: 2022-03-31
Payer: COMMERCIAL

## 2022-03-31 VITALS
HEIGHT: 63 IN | OXYGEN SATURATION: 100 % | SYSTOLIC BLOOD PRESSURE: 128 MMHG | BODY MASS INDEX: 22.22 KG/M2 | DIASTOLIC BLOOD PRESSURE: 60 MMHG | TEMPERATURE: 98.3 F | HEART RATE: 100 BPM | RESPIRATION RATE: 18 BRPM | WEIGHT: 125.4 LBS

## 2022-03-31 DIAGNOSIS — G89.29 OTHER CHRONIC BACK PAIN: Primary | ICD-10-CM

## 2022-03-31 DIAGNOSIS — M54.9 OTHER CHRONIC BACK PAIN: Primary | ICD-10-CM

## 2022-03-31 PROCEDURE — 99213 OFFICE O/P EST LOW 20 MIN: CPT | Performed by: FAMILY MEDICINE

## 2022-03-31 RX ORDER — CYCLOBENZAPRINE HCL 10 MG
5 TABLET ORAL
Qty: 4 TABLET | Refills: 0 | Status: SHIPPED | OUTPATIENT
Start: 2022-03-31 | End: 2022-03-31 | Stop reason: CLARIF

## 2022-03-31 RX ORDER — CYCLOBENZAPRINE HCL 10 MG
5 TABLET ORAL
Qty: 4 TABLET | Refills: 0 | Status: SHIPPED | OUTPATIENT
Start: 2022-03-31 | End: 2022-05-28 | Stop reason: SDUPTHER

## 2022-03-31 RX ORDER — CYCLOBENZAPRINE HCL 5 MG
5 TABLET ORAL
Qty: 7 TABLET | Refills: 0 | Status: SHIPPED | OUTPATIENT
Start: 2022-03-31 | End: 2022-03-31 | Stop reason: CLARIF

## 2022-04-05 ENCOUNTER — PROCEDURE VISIT (OUTPATIENT)
Dept: FAMILY MEDICINE CLINIC | Facility: CLINIC | Age: 42
End: 2022-04-05
Payer: COMMERCIAL

## 2022-04-05 VITALS
SYSTOLIC BLOOD PRESSURE: 108 MMHG | DIASTOLIC BLOOD PRESSURE: 76 MMHG | TEMPERATURE: 97.8 F | RESPIRATION RATE: 18 BRPM | OXYGEN SATURATION: 99 % | HEART RATE: 92 BPM

## 2022-04-05 DIAGNOSIS — M62.830 SPASM OF THORACIC BACK MUSCLE: ICD-10-CM

## 2022-04-05 DIAGNOSIS — M41.84 DEXTROSCOLIOSIS OF THORACIC SPINE: Primary | ICD-10-CM

## 2022-04-05 PROCEDURE — 98925 OSTEOPATH MANJ 1-2 REGIONS: CPT | Performed by: ORTHOPAEDIC SURGERY

## 2022-04-05 NOTE — PROGRESS NOTES
OMT  Performed by: Salud Vides DO  Authorized by: Salud Vides,      Verbal consent obtained?: Yes    Written consent obtained?: No    Risks and benefits: Risks, benefits and alternatives were discussed    Consent given by:  Patient  Patient states understanding of procedure being performed: Yes    Patient's understanding of procedure matches consent: Yes    Procedure consent matches procedure scheduled: Yes    Relevant documents present and verified: No    Test results available and properly labeled: No    Site marked: No    Radiology Images displayed and confirmed  If images not available, report reviewed: Yes    Patient identity confirmed:  Verbally with patient  Procedure Details:     Region evaluated and treated:  Thoracic T5 - T9    Thoracic T5 - T9 details:     Examination Method:  Tissue Texture Change, Stability, Laxity, Effusions, Tone, Range of Motion, Contracture, Passive and Active    Severity:  Moderate    Treatment Method:  Muscle Energy Treatment, Myofascial Release Treatment and Soft Tissue Treatment    Response:  Improved - The somatic dysfunction is improved but not completely resolved  Total Regions Treated:  1  Attending provider present in exam room for procedure: Yes    Assessment/Plan:      Diagnoses and all orders for this visit:    Dextroscoliosis of thoracic spine  Comments:  Patient referred to physical therapy  Patient has been scheduled to follow-up with Dr Patti Ibrahim for trial of acupuncture  Orders:  -     Ambulatory Referral to Physical Therapy; Future    Spasm of thoracic back muscle  Comments:  Patient referred to physical therapy  Patient has been scheduled to follow-up with Dr Patti Ibrahim for trial of acupuncture  Orders:  -     Ambulatory Referral to Physical Therapy; Future    Other orders  -     OMT          Subjective:     Patient ID: Marimar Smith is a 38 y o  female      Pleasant 46-year-old female with past medical history of thoracic scoliosis who is presenting today for OMT 2/2 chronic thoracic back spasm  Review of Systems      Objective:  /76, HR 92, RR 18, temp 97 8°, SpO2 99%       Physical Exam  Vitals reviewed  Constitutional:       General: She is not in acute distress  Appearance: Normal appearance  She is normal weight  She is not ill-appearing, toxic-appearing or diaphoretic  HENT:      Head: Normocephalic and atraumatic  Cardiovascular:      Rate and Rhythm: Normal rate and regular rhythm  Pulses: Normal pulses  Heart sounds: Normal heart sounds  No murmur heard  No gallop  Pulmonary:      Effort: Pulmonary effort is normal       Breath sounds: No wheezing, rhonchi or rales  Musculoskeletal:         General: Tenderness and deformity (Thoracic dextroscoliosis) present  Comments: T5-T9 paraspinal muscle   Skin:     General: Skin is warm  Neurological:      Mental Status: She is alert and oriented to person, place, and time

## 2022-04-05 NOTE — LETTER
April 5, 2022     Patient: Roscoe Anderson   YOB: 1980   Date of Visit: 4/5/2022       To Whom it May Concern:    Mary Randhawa is under my professional care  She was seen in my office on 4/5/2022  She may return to work on 04/05/2022  If you have any questions or concerns, please don't hesitate to call           Sincerely,          Corey Webber DO        CC: No Recipients

## 2022-04-05 NOTE — LETTER
April 5, 2022     Patient: Raghavendra Turcios   YOB: 1980   Date of Visit: 4/5/2022       To Whom it May Concern:    Crystal Hermosillo is under my professional care  She was seen in my office on 4/5/2022  She may return to work on 4/5/22  If you have any questions or concerns, please don't hesitate to call           Sincerely,          Trevor Alejo, DO

## 2022-04-06 DIAGNOSIS — M54.6 CHRONIC RIGHT-SIDED THORACIC BACK PAIN: ICD-10-CM

## 2022-04-06 DIAGNOSIS — G89.29 CHRONIC RIGHT-SIDED THORACIC BACK PAIN: ICD-10-CM

## 2022-04-06 NOTE — TELEPHONE ENCOUNTER
Please review refill request  Pt recently seen at [address] office   Per Dr Lucas Farah, last month was last refill from us

## 2022-04-08 RX ORDER — OXYCODONE HYDROCHLORIDE AND ACETAMINOPHEN 5; 325 MG/1; MG/1
TABLET ORAL
Qty: 30 TABLET | Refills: 0 | Status: SHIPPED | OUTPATIENT
Start: 2022-04-08 | End: 2022-05-16 | Stop reason: SDUPTHER

## 2022-04-08 NOTE — TELEPHONE ENCOUNTER
Although I am listed as PCP, I have never met this patient or discussed her chronic pain  Patient did see Dr Andrew Brown for chronic pain on 3/31  Will differ decision on opiate refill to him as last provider to see her for chronic pain

## 2022-04-08 NOTE — TELEPHONE ENCOUNTER
Patient called for update on refill   Informed her it was sent to [place] and confirmation received at 3:06PM

## 2022-04-21 DIAGNOSIS — F51.01 PRIMARY INSOMNIA: ICD-10-CM

## 2022-04-25 RX ORDER — ZOLPIDEM TARTRATE 5 MG/1
TABLET ORAL
Qty: 15 TABLET | Refills: 2 | Status: SHIPPED | OUTPATIENT
Start: 2022-04-25 | End: 2022-07-19 | Stop reason: SDUPTHER

## 2022-05-10 ENCOUNTER — PROCEDURE VISIT (OUTPATIENT)
Dept: FAMILY MEDICINE CLINIC | Facility: CLINIC | Age: 42
End: 2022-05-10
Payer: COMMERCIAL

## 2022-05-10 VITALS
TEMPERATURE: 98.1 F | DIASTOLIC BLOOD PRESSURE: 78 MMHG | HEART RATE: 90 BPM | SYSTOLIC BLOOD PRESSURE: 110 MMHG | BODY MASS INDEX: 22.32 KG/M2 | OXYGEN SATURATION: 99 % | HEIGHT: 63 IN | RESPIRATION RATE: 18 BRPM | WEIGHT: 126 LBS

## 2022-05-10 DIAGNOSIS — Z12.31 SCREENING MAMMOGRAM FOR BREAST CANCER: ICD-10-CM

## 2022-05-10 DIAGNOSIS — M41.84 DEXTROSCOLIOSIS OF THORACIC SPINE: Primary | ICD-10-CM

## 2022-05-10 PROCEDURE — 98925 OSTEOPATH MANJ 1-2 REGIONS: CPT | Performed by: ORTHOPAEDIC SURGERY

## 2022-05-10 PROCEDURE — 3008F BODY MASS INDEX DOCD: CPT | Performed by: FAMILY MEDICINE

## 2022-05-10 NOTE — PROGRESS NOTES
OMT  Performed by: Keyshawn Sifuentes DO  Authorized by: Keyshawn iSfuentes DO   Universal Protocol:  Procedure performed by:  Consent: Verbal consent obtained  Written consent not obtained  Consent given by: patient  Patient understanding: patient states understanding of the procedure being performed  Patient consent: the patient's understanding of the procedure matches consent given  Procedure consent: procedure consent matches procedure scheduled  Relevant documents: relevant documents not present or verified  Test results: test results not available  Site marked: the operative site was not marked  Radiology Images displayed and confirmed  If images not available, report reviewed: imaging studies not available  Patient identity confirmed: verbally with patient        Procedure Details:     Region evaluated and treated:  Thoracic    Thoracic Information  Thoracic Region: T5 - T9  Thoracic T5 - T9 details:     Examination Method:  Tissue Texture Change, Stability, Laxity, Effusions, Tone, Range of Motion, Contracture, Tenderness, Pain, Asymmetry, Misalignment, Crepitation, Defects, Masses, Active and Passive    Severity:  Mild    Treatment Method:  Direct Treatment, Indirect Treatment, Muscle Energy Treatment, Myofascial Release Treatment and Soft Tissue Treatment    Response:  Improved - The somatic dysfunction is improved but not completely resolved  Total Regions Treated:  1  Attending provider present in exam room for procedure: Yes      Assessment/Plan:     Diagnoses and all orders for this visit:    Dextroscoliosis of thoracic spine  Comments:  OMT performed and tolerated  Patient to advised on adequate hydration and use of Tylenol/ibuprofen  Patient will F/u with Dr Malachi Vogt for acupuncture    Screening mammogram for breast cancer  -     Mammo screening bilateral w 3d & cad; Future    Other orders  -     OMT          Subjective:      Patient ID: Evaristo Fong is a 38 y o  female      Pleasant looking 61-year-old female who is presenting today for evaluation and OMT for chronic back pain  Back Pain  This is a chronic problem  The current episode started more than 1 year ago  The problem occurs constantly  The problem has been waxing and waning since onset  The pain is present in the thoracic spine  The quality of the pain is described as aching  The pain is at a severity of 5/10  The pain is moderate  The symptoms are aggravated by bending, twisting and position  Associated symptoms include chest pain  Pertinent negatives include no numbness, paresis or weakness  Risk factors include poor posture  She has tried NSAIDs, muscle relaxant and home exercises for the symptoms  The treatment provided mild relief  The following portions of the patient's history were reviewed and updated as appropriate:   She  has a past medical history of Chronic fatigue, Endometriosis, Hypertension, Hypoglycemia, Liver lesion, Migraine, Ovarian cyst, Scoliosis, and Varicella    She   Patient Active Problem List    Diagnosis Date Noted    S/P repeat low transverse  2020    Premature atrial complexes 2019    Family history of congenital heart defect 2019    History of placenta abruption 2019    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 10/16/2019    Family history of sickle cell trait 10/09/2019    History of  delivery 10/09/2019    Essential hypertension 2019    Tachycardia 2019    Abnormal EKG 2019    Scoliosis 2019    Panic attacks 2019    Vitamin D deficiency 2016    Cervical herniated disc 06/10/2016    Endometriosis 06/10/2016    Hemangioma of liver 06/10/2016    Acquired ankle/foot deformity 03/10/2016    GERD (gastroesophageal reflux disease) 2016    Mass of breast 2015    Generalized anxiety disorder 10/02/2013     She  has a past surgical history that includes  section; LAPAROSCOPY; Salpingectomy; Ectopic pregnancy surgery; and pr  delivery only (N/A, 3/26/2020)  Her family history includes Alcohol abuse in her maternal uncle; Cancer in her maternal grandfather, maternal uncle, mother, and paternal grandmother; Diabetes in her maternal grandmother; Heart disease in her maternal grandmother, paternal grandmother, and sister; Hyperlipidemia in her father and mother; Hypertension in her mother and sister; Lung cancer in her paternal grandfather; Other in her daughter, maternal uncle, and paternal grandfather; Stroke in her maternal grandfather  She  reports that she has quit smoking  She has never used smokeless tobacco  She reports current alcohol use  She reports that she does not use drugs    Current Outpatient Medications   Medication Sig Dispense Refill    acetaminophen (Tylenol) 325 mg tablet Take 1 tablet (325 mg total) by mouth every 6 (six) hours as needed for mild pain 90 tablet 3    ALPRAZolam (XANAX) 0 25 mg tablet Take 1 tab p o  1 hour before procedure 1 tablet 0    cyclobenzaprine (FLEXERIL) 10 mg tablet Take 0 5 tablets (5 mg total) by mouth daily at bedtime for 7 days 4 tablet 0    Dapsone 5 % topical gel APPLY TOPICALLY TO THE AFFECTED AREA DAILY 90 g 0    ergocalciferol (VITAMIN D2) 50,000 units TAKE 1 CAPSULE BY MOUTH 1 TIME A WEEK 8 capsule 4    hydrOXYzine HCL (ATARAX) 25 mg tablet TAKE 1 TABLET(25 MG) BY MOUTH DAILY AT BEDTIME (Patient taking differently: 25 mg daily at bedtime as needed  ) 30 tablet 11    lisinopril (ZESTRIL) 10 mg tablet Take 1 tablet (10 mg total) by mouth 2 (two) times a day 120 tablet 3    naproxen (NAPROSYN) 500 mg tablet TAKE 1 TABLET(500 MG) BY MOUTH TWICE DAILY WITH MEALS (Patient taking differently: 2 (two) times a day as needed  ) 60 tablet 5    Omega-3 Fatty Acids (FISH OIL) 1,000 mg Take 1,000 mg by mouth daily      oxyCODONE-acetaminophen (PERCOCET) 5-325 mg per tablet 1 tablet daily for back pain 30 tablet 0    saccharomyces boulardii (FLORASTOR) 250 mg capsule Take 1 capsule (250 mg total) by mouth 2 (two) times a day Take 4 hours after antibiotic 30 capsule 0    zolpidem (AMBIEN) 5 mg tablet TAKE 1/2 TABLET(2 5 MG) BY MOUTH DAILY AT BEDTIME AS NEEDED FOR SLEEP 15 tablet 2    azithromycin (ZITHROMAX) 250 mg tablet  (Patient not taking: Reported on 5/10/2022 )      butalbital-acetaminophen-caffeine (FIORICET,ESGIC) -40 mg per tablet Take 1 tablet by mouth every 4 (four) hours as needed for headaches (Patient not taking: Reported on 12/7/2020) 5 tablet 0    fluticasone (FLONASE) 50 mcg/act nasal spray SHAKE LIQUID AND USE 1 SPRAY IN EACH NOSTRIL DAILY (Patient not taking: Reported on 3/31/2022) 16 g 0    ketoconazole (NIZORAL) 2 % cream Apply topically daily 60 g 1    labetalol (NORMODYNE) 200 mg tablet Take 1 tablet (200 mg total) by mouth 2 (two) times a day (Patient not taking: Reported on 5/10/2022 ) 180 tablet 3    lisinopril (ZESTRIL) 10 mg tablet  (Patient not taking: Reported on 5/10/2022 )      naloxone (NARCAN) 4 mg/0 1 mL nasal spray 0 1 mL (4 mg total) into each nostril once for 1 dose If the desired response is not obtained after 2-3 minutes, administer an additional dose using a new spray 1 each 1    Prenatal Vit-Fe Fumarate-FA (PRENATAL VITAMIN PLUS LOW IRON) 27-1 MG TABS Take 1 tablet by mouth daily (Patient not taking: Reported on 5/10/2022 )      traMADol (ULTRAM) 50 mg tablet 1 tablet daily (Patient not taking: Reported on 3/31/2022 ) 14 tablet 0     No current facility-administered medications for this visit       Current Outpatient Medications on File Prior to Visit   Medication Sig    acetaminophen (Tylenol) 325 mg tablet Take 1 tablet (325 mg total) by mouth every 6 (six) hours as needed for mild pain    ALPRAZolam (XANAX) 0 25 mg tablet Take 1 tab p o  1 hour before procedure    cyclobenzaprine (FLEXERIL) 10 mg tablet Take 0 5 tablets (5 mg total) by mouth daily at bedtime for 7 days    Dapsone 5 % topical gel APPLY TOPICALLY TO THE AFFECTED AREA DAILY    ergocalciferol (VITAMIN D2) 50,000 units TAKE 1 CAPSULE BY MOUTH 1 TIME A WEEK    hydrOXYzine HCL (ATARAX) 25 mg tablet TAKE 1 TABLET(25 MG) BY MOUTH DAILY AT BEDTIME (Patient taking differently: 25 mg daily at bedtime as needed  )    lisinopril (ZESTRIL) 10 mg tablet Take 1 tablet (10 mg total) by mouth 2 (two) times a day    naproxen (NAPROSYN) 500 mg tablet TAKE 1 TABLET(500 MG) BY MOUTH TWICE DAILY WITH MEALS (Patient taking differently: 2 (two) times a day as needed  )    Omega-3 Fatty Acids (FISH OIL) 1,000 mg Take 1,000 mg by mouth daily    oxyCODONE-acetaminophen (PERCOCET) 5-325 mg per tablet 1 tablet daily for back pain    saccharomyces boulardii (FLORASTOR) 250 mg capsule Take 1 capsule (250 mg total) by mouth 2 (two) times a day Take 4 hours after antibiotic    zolpidem (AMBIEN) 5 mg tablet TAKE 1/2 TABLET(2 5 MG) BY MOUTH DAILY AT BEDTIME AS NEEDED FOR SLEEP    azithromycin (ZITHROMAX) 250 mg tablet  (Patient not taking: Reported on 5/10/2022 )    butalbital-acetaminophen-caffeine (FIORICET,ESGIC) -40 mg per tablet Take 1 tablet by mouth every 4 (four) hours as needed for headaches (Patient not taking: Reported on 12/7/2020)    fluticasone (FLONASE) 50 mcg/act nasal spray SHAKE LIQUID AND USE 1 SPRAY IN EACH NOSTRIL DAILY (Patient not taking: Reported on 3/31/2022)    ketoconazole (NIZORAL) 2 % cream Apply topically daily    labetalol (NORMODYNE) 200 mg tablet Take 1 tablet (200 mg total) by mouth 2 (two) times a day (Patient not taking: Reported on 5/10/2022 )    lisinopril (ZESTRIL) 10 mg tablet  (Patient not taking: Reported on 5/10/2022 )    naloxone (NARCAN) 4 mg/0 1 mL nasal spray 0 1 mL (4 mg total) into each nostril once for 1 dose If the desired response is not obtained after 2-3 minutes, administer an additional dose using a new spray    Prenatal Vit-Fe Fumarate-FA (PRENATAL VITAMIN PLUS LOW IRON) 27-1 MG TABS Take 1 tablet by mouth daily (Patient not taking: Reported on 5/10/2022 )    traMADol (ULTRAM) 50 mg tablet 1 tablet daily (Patient not taking: Reported on 3/31/2022 )     No current facility-administered medications on file prior to visit  She is allergic to cephalexin, levofloxacin, and sulfa antibiotics       Review of Systems   Cardiovascular: Positive for chest pain  Musculoskeletal: Positive for back pain  Neurological: Negative for weakness and numbness  Objective:      /78 (BP Location: Left arm, Patient Position: Sitting, Cuff Size: Standard)   Pulse 90   Temp 98 1 °F (36 7 °C) (Tympanic)   Resp 18   Ht 5' 3" (1 6 m)   Wt 57 2 kg (126 lb)   LMP 04/19/2022   SpO2 99%   BMI 22 32 kg/m²          Physical Exam  Vitals reviewed  Constitutional:       Appearance: Normal appearance  She is normal weight  HENT:      Head: Normocephalic and atraumatic  Right Ear: External ear normal       Left Ear: External ear normal    Cardiovascular:      Rate and Rhythm: Normal rate and regular rhythm  Pulses: Normal pulses  Heart sounds: Normal heart sounds  No murmur heard  No friction rub  No gallop  Pulmonary:      Effort: Pulmonary effort is normal       Breath sounds: Normal breath sounds  No wheezing, rhonchi or rales  Musculoskeletal:         General: Tenderness (T5-T9) and deformity (Dextroscoliosis) present  No signs of injury  Normal range of motion  Neurological:      Mental Status: She is alert

## 2022-05-12 ENCOUNTER — PROCEDURE VISIT (OUTPATIENT)
Dept: FAMILY MEDICINE CLINIC | Facility: CLINIC | Age: 42
End: 2022-05-12
Payer: COMMERCIAL

## 2022-05-12 VITALS
HEART RATE: 96 BPM | SYSTOLIC BLOOD PRESSURE: 108 MMHG | BODY MASS INDEX: 21.61 KG/M2 | DIASTOLIC BLOOD PRESSURE: 72 MMHG | WEIGHT: 122 LBS | OXYGEN SATURATION: 99 % | TEMPERATURE: 98.3 F | RESPIRATION RATE: 16 BRPM

## 2022-05-12 DIAGNOSIS — F41.1 GENERALIZED ANXIETY DISORDER: Primary | ICD-10-CM

## 2022-05-12 DIAGNOSIS — M79.18 MYOFASCIAL PAIN DYSFUNCTION SYNDROME: ICD-10-CM

## 2022-05-12 DIAGNOSIS — I10 ESSENTIAL HYPERTENSION: ICD-10-CM

## 2022-05-12 DIAGNOSIS — Z87.59 HISTORY OF PLACENTA ABRUPTION: ICD-10-CM

## 2022-05-12 PROCEDURE — 97810 ACUP 1/> WO ESTIM 1ST 15 MIN: CPT | Performed by: FAMILY MEDICINE

## 2022-05-12 PROCEDURE — 99214 OFFICE O/P EST MOD 30 MIN: CPT | Performed by: FAMILY MEDICINE

## 2022-05-12 PROCEDURE — 1036F TOBACCO NON-USER: CPT | Performed by: FAMILY MEDICINE

## 2022-05-12 NOTE — PATIENT INSTRUCTIONS
Tips for Good Sleep     Start with basic sleep hygiene:  1  Life can be hectic, but do your best to honor the fundamental rhythmic infrastructure of life:    Regular bed and rising time, obtaining exposure to early morning light and evening dim light    Regular times for meals and exercise  Don't nap  Although napping is known to offer a number of medical and psychological benefits, it is contraindicated with insomnia  2   Reduce 'body noise':    Manage caffeine, nicotine, alcohol and other drugs  Alcohol should never be taken as a sleep aid because it suppresses deep & REM (dreaming) sleep which are necessary for health  Given its substantial half-life, standard cautions about caffeine may not be sufficiently conservative for many  Avoid all caffeine and other stimulant use after noon  3  Reduce mind noise: Anxiety and depression, high stress and unresolved emotions all are common causes of disturbed sleep (insomnia)  Talk to your doctor about further treatment of these, including seeing a therapist to assist resolving unprocessed emotions  Use the Bedroom (or at least the bed) for sleep & sex only  Don't have a TV in the bedroom, and don't work in the bedroom     Go to bed only when sleepy  Develop a consistent bedtime ritual to facilitate letting go of wakefulness, e g  warm bath, journaling (excellent to process emotions), restful practices that induce relation response such as relaxation exercises  Headspace Premium version has a 30 part healthy sleep module set and a nice sleep session  Turn lights down low an hour prior to bedtime  If bright lights are unavoidable in the evening, use flora/orange glasses to block melatonin-suppressing blue light for the 1 - 2 hrs  prior to bedtime  Keep evening entertainment light  Don't watch the evening news if upsetting  Light comedy is ok   Avoid 'post-dramatic' stress disorder such as watching scary movies and news     Emphasize the key process of letting go or surrender in sleep onset  In the end, we cannot finagle sleep  We can set the stage and be receptive to it, but we cannot intentionally "go to sleep " Efforts to do so typically backfire     4  Reduce bedroom noise:    Healthy sleep environment: Bedroom cool (~ 76 F), completely dark, quiet,   psychologically safe and as environmentally 'green' as is feasible  Dusk simulation by dimming lights 1 - 3 hrs  prior to bedtime  HEPA filtration & houseplants, organic and non-   toxic bedding  Avoid clock watching - position the clock away from the bed       3  Regular exercise is critical and encourages deep sleep    However, avoid aerobics at least 3 - 4 hours prior to bed because it raises core temperature, which can interfere with sleep  5  Nutrition:    Avoid high glycemic (like processed bakery goods & snacks) and harder to digest foods as bedtime snacks  Avoid GERD (heartburn)  aggravating foods  As an alternative, consider complex carbohydrates that may help transport tryptophan, a precursor to melatonin, across the blood-brain barrier  Calcium, Mg, B-complex support sleep    Other sleep aids:   Sleep masks can help to keep dark  Also comforting   Ear plugs   White noise: fans and humidifiers,   'Sleep Bug' and other apps   Brain wave entrainment      6  Supplements that support sleep     Botanicals alone or in combination like valerian, lemon balm, lavender, chamomile (caution, some diuretic effect), and/or hops  A good brand is 'Phytocalm' by [employer]   Valerian ( Valeriana officinalis ) root  Requires 2 - 3 weeks to work  Dose: 1-3 grams crude root or 800-1200 mg of an extract standardized to 0 8-1 0% valerenic acid taken 30-60 minutes   prior to bedtime   More for chronic insomnia  Can be used as part of wean off benzos        Hops ( Humulus lupulus) Commission E approved for anxiety and sleep disturbance Dose is 300-500 mg before bed either in capsule or tincture  Melatonin is useful in aging populations and/or with circadian   irregularities  Always couple supplementation with other sleep hygiene recommendations  Dose: Adults: 0 25-0 5 mg SL sustained-release: ( not the common 3 mg tab)  SL bypasses first pass liver metabolism & has more reliable levels  Take at ~ 8PM rather than at bedtime   ~ 11 PM  Not a sleeping pill  Rather a gentle invitation to sleep, but will   not override a lot of noise  As melatonin levels rise, body temp  lowers  Adverse effects rare & usually related to v  high doses  May   increase dreaming  Melatonin works best in jet lag, shift workers, visually blind to re-set circadian   rhythm  For jetlag, works best if travel eastward  Take it a couple of   hours prior to when would have gone to sleep at one's origin  Counterproductive measures that don't help much in the long run:   Hypnotics don't work, with no improvement on sleep polysomography  They just prevent remembering poor sleep  Include Ambien, Lunesta, Benzodiazepines    OTC sleepers such as Benadryl (diphenhydramine) are problematic - anti-cholinergic side effects like dry mouth, can lead to REM suppression  ½ life ~ 18 hrs  can disturb memory  Staying longer in bed when didn't sleep well is generally counter-productive        Dear Ms Giordano Nearing : Welcome to the acupuncture/myofascial treatment suite! Dr Mena Goldman is certified in medical acupuncture and will evaluate your clinical situation and decide with you the best treatment course  In addition to [facility], Dr Mena Goldman is board certified in primary care Sports Medicine and in [address] [place] [address]  He will generally combine different approaches and modalities to optimize your path towards healing  In other words, acupuncture will generally be just one modality in the total therapeutic plan  Usually you will need several acupuncture treatments 1/2 to 4 weeks apart to address most concerns  What to expect during an acupuncture/myofascial treatment session:  Please arrive a few minutes early so the medical assistant can get you set up in the treatment room  Generally the full treatment time (a minimum of 30 minutes should be scheduled) is needed for the treatment, so it is usually not possible to attend to other issues at the same time  If you have other health needs that also need attention, please schedule a separate visit for these  Please wear loose clothing so it is easy to disrobe or expose areas of the body for treatment, including the arms and feet and lower legs  In most cases, you do not need to take off under-garments, though women may need to undo the back of their bra to access the back as needed  Avoid eating a heavy meal right before a session and be sure to empty your bladder/bowels before a session  Acupuncture is designed to treat the entire bio-energy system of the body, so don't be surprised if Dr Tiffany Vargas places needles in your feet, legs, arms or ear (very tiny ones) even though your area of concern is your headache, neck or back, etc      Once an initial evaluation is done, you will generally lay down on your back (supine) or stomach (prone)  Pillows and blankets are available for your comfort  Lyn[place] are single use, sterile packed  Usually no antiseptic is used or needed, since infection rate from acupuncture is extremely low  Needles are placed, often with electrostimulation added to enhance the effect  Electrostimulation will generally feel like a pleasant 'flow' or 'buzz' of energy through the part of the body being treated  By giving Dr Tiffany Vargas feedback as he adjusts the level of electrostimulation, you will be totally in control of the level that feels right for you  Dr Tiffany Vargas may add some modalities such as ear acupuncture (auriculotherapy), massage, cupping, heat, relaxing aromas and sound and other techniques to enhance the treatment    Many patients will experience the treatment as a pleasant, relaxing sensation, and you may fall asleep  After the treatment is completed, please get up slowly  Sometimes patients will feel a little 'floaty' or giddy after a treatment  Be sure not to drive if you do not feel you can concentrate sufficiently  You are welcome to wait in our waiting room until your sense of concentration re-establishes, usually in several minutes  After a treatment, your body will need some space to re-equilibrate its bio-energy, so take it easy for the 24 hrs  after a treatment, avoiding any intense mental or physical activity  Best to avoid sexual activity and heavy meals during this period  Over the next few days your symptoms will generally improve  Infrequently there may be a temporary worsening for a day or so and then an improvement, or no change at all, indicating the treatment protocol needs adjustment  Please give Dr Sloan Litten feedback on the effect of your treatment or any concerns you have  FAQ's:  Q: Will acupuncture hurt? A: Acupuncture needles are much thinner than needles used to draw blood or give medical injections, which are cutting needles  Acupuncture needles instead are designed to part tissue, that is, to slip between tissues  So the experience of receiving acupuncture is markedly different than getting blood drawn or getting a shot  Most patients will either feel nothing or minimal to mild discomfort, though occasionally if a body part is particularly sensitive, there might be more discomfort  Dr Sloan Litten considers your comfort paramount and will work with you to minimize any discomfort  Once an acupuncture needle is placed, is is designed to tap into the body's energy system, at which time you may feel a mild deep ache that travels along the energy channel being treated (this is a good sign of potential efficacy)  Q: What is acupuncture useful for?  A: Acupuncture can be used to treat a wide range of medical problems  Some of the most common include:   1  Musculoskeletal pain such as neck, back, shoulder, knee pain   2  Headaches, including tension, migraine, etc    3  Various other pain syndromes, such as chronic pelvic pain   3  Anxiety and stress disorders   4  Chronic functional issues such as Irritable bowel or bladder syndromes    Q: Is there any scientific evidence supporting efficacy of acupuncture? A: There is good scientific evidence supporting acupuncture for many chronic pain syndromes such as chronic low back pain and headaches  Other traditional indications have less clear evidence  Q: Are there any adverse effects or complications of acupuncture? A: Acupuncture is a remarkably safe modality, much safer than many medical treatments commonly used  Nevertheless, like any modality, there are possible adverse effects  Notify us if:   1  - you easily faint with needling (vasovagal reaction)    2  - you are pregnant, since certain acupuncture points should be avoided in this case  3  - you have a  pacemaker or other electronic implant  Dr Emma Maher will ensure the treatment is adjusted to be  compatible with these devices  4  - you are on blood thinners  Generally acupuncture can still proceed, but Dr Emma Maher will take this into account in designing the treatment program  Blood thinners increase the chance of bleeding or bruising, which is usually harmless and self-limited  5  Common minor adverse effects include occasional tiny bleeds or small bruises or mild residual soreness at a needling site   These are generally harmless and resolve over several days, but report if you have any unusual symptoms  Occasionally an acupuncture treatment may flare up an issue it is designed to treat, such as increasing muscle irritability at a needled site, this usually resolves on its own after several days  6  Occasionally the needle could touch a nerve or other sensitive structure   This usually does not cause any significant long term harm, but be sure to give Dr Jade Alejandre feedback if you experience any such discomfort and he can adjust the needle  7  Rarely a needle could  puncture a lung, other organ or cause damage to a nerve or other structure leading to serious medical issues  Infections at needling sites are also rare, though less rare in patients with significant immunosuppression such as cancer patients on chemotherapy or transplant patients on immunosuppressants  All acupuncture needles are sterile packed and single use, so there is no chance of getting an infection from another patient via a contaminated needle  Serious adverse effects are very rare in acupuncture, and Dr Jade Alejandre is trained to minimize the chance of these happening
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
## 2022-05-13 ENCOUNTER — TELEPHONE (OUTPATIENT)
Dept: FAMILY MEDICINE CLINIC | Facility: CLINIC | Age: 42
End: 2022-05-13

## 2022-05-13 DIAGNOSIS — J06.9 UPPER RESPIRATORY INFECTION, VIRAL: Primary | ICD-10-CM

## 2022-05-13 PROCEDURE — 87636 SARSCOV2 & INF A&B AMP PRB: CPT | Performed by: FAMILY MEDICINE

## 2022-05-13 NOTE — TELEPHONE ENCOUNTER
We do not have the capacity to do Flu tests at our office at all  We can only test for COVID  Order has been placed for COVID/Flu swab to be done at [facility]   Please let pt know

## 2022-05-13 NOTE — TELEPHONE ENCOUNTER
Patient took a rapid home COVID test this morning and it came back positive  Patient's employer is asking that she get a test done at her doctor's office  Also patient would like to be tested for the flu too  Please place order in chart  Patient will be coming here this afternoon    She will be in a bluish burt [employer]

## 2022-05-14 LAB
FLUAV RNA RESP QL NAA+PROBE: NEGATIVE
FLUBV RNA RESP QL NAA+PROBE: NEGATIVE
SARS-COV-2 RNA RESP QL NAA+PROBE: NEGATIVE

## 2022-05-16 DIAGNOSIS — G89.29 CHRONIC RIGHT-SIDED THORACIC BACK PAIN: ICD-10-CM

## 2022-05-16 DIAGNOSIS — M54.6 CHRONIC RIGHT-SIDED THORACIC BACK PAIN: ICD-10-CM

## 2022-05-17 DIAGNOSIS — L70.0 ACNE VULGARIS: ICD-10-CM

## 2022-05-17 RX ORDER — DAPSONE 50 MG/G
GEL TOPICAL
Qty: 90 G | Refills: 0 | Status: SHIPPED | OUTPATIENT
Start: 2022-05-17 | End: 2022-06-30 | Stop reason: SDUPTHER

## 2022-05-19 ENCOUNTER — TELEPHONE (OUTPATIENT)
Dept: FAMILY MEDICINE CLINIC | Facility: CLINIC | Age: 42
End: 2022-05-19

## 2022-05-19 RX ORDER — OXYCODONE HYDROCHLORIDE AND ACETAMINOPHEN 5; 325 MG/1; MG/1
TABLET ORAL
Qty: 30 TABLET | Refills: 0 | Status: SHIPPED | OUTPATIENT
Start: 2022-05-19 | End: 2022-06-30 | Stop reason: SDUPTHER

## 2022-05-19 NOTE — TELEPHONE ENCOUNTER
----- Message from John Alfred MD sent at 5/19/2022  1:06 PM EDT -----  Regarding: Schedule followup  Please call patient to schedule a follow up appointment for chronic pain for next month  Refill for percocet sent

## 2022-05-28 ENCOUNTER — OFFICE VISIT (OUTPATIENT)
Dept: URGENT CARE | Facility: CLINIC | Age: 42
End: 2022-05-28
Payer: COMMERCIAL

## 2022-05-28 VITALS — HEART RATE: 83 BPM | TEMPERATURE: 97.3 F | RESPIRATION RATE: 14 BRPM | OXYGEN SATURATION: 100 %

## 2022-05-28 DIAGNOSIS — J30.2 SEASONAL ALLERGIES: ICD-10-CM

## 2022-05-28 DIAGNOSIS — J02.9 SORE THROAT: Primary | ICD-10-CM

## 2022-05-28 PROCEDURE — 87636 SARSCOV2 & INF A&B AMP PRB: CPT | Performed by: PHYSICIAN ASSISTANT

## 2022-05-28 PROCEDURE — 99213 OFFICE O/P EST LOW 20 MIN: CPT | Performed by: PHYSICIAN ASSISTANT

## 2022-05-28 RX ORDER — CARVEDILOL 6.25 MG/1
TABLET ORAL
COMMUNITY
Start: 2022-05-24

## 2022-05-28 RX ORDER — COVID-19 MOLECULAR TEST ASSAY
KIT MISCELLANEOUS
COMMUNITY
Start: 2022-05-26

## 2022-05-28 NOTE — PROGRESS NOTES
[facility]        NAME: Yessica Gonzalez is a 38 y o  female  : 1980    MRN: [id number]  DATE: May 28, 2022  TIME: 3:12 PM    Assessment and Plan   Sore throat [J02 9]  1  Sore throat  Covid/Flu-Office Collect   2  Seasonal allergies       Patient Instructions   Seasonal allergies vs viral URI  covid combo swab done, mychart for results  Recommend daily claritin/zyrtec  Can also use flonase/sudafed as needed for congestion  Keep windows closed, HEPA filter  Rest, fluids and supportive care  May benefit from a cool mist humidifier on night stand  Tylenol/ibuprofen as needed for pain/fever    Follow up with PCP in 3-5 days  Proceed to  ER if symptoms worsen  Chief Complaint     Chief Complaint   Patient presents with    Cold Like Symptoms     Pt presents with runny nose that started one week ago, worsened two days ago, throat discomfort; ear congestion bilateral, sinus congestion  History of Present Illness       Anival Mahajan is a 51-year-old female who presents to clinic complaining of sore throat x1 day  She states she has been having nasal congestion and rhinorrhea x1 week however yesterday those both worsened and the source throat started  She also notes bilateral maxillary sinus pressure but not any pain  She denies any fever, chills, fatigue, myalgias, cough, shortness of breath, ear pain, nausea, vomiting, diarrhea, loss of taste or smell, recent travel or exposure to anyone known COVID positive  Review of Systems   Review of Systems   Constitutional: Negative for chills, fatigue and fever  HENT: Positive for congestion, rhinorrhea, sinus pressure and sore throat  Negative for ear pain and sinus pain  Respiratory: Positive for shortness of breath  Negative for cough  Gastrointestinal: Negative for diarrhea, nausea and vomiting  Musculoskeletal: Negative for myalgias  Neurological: Negative for headaches           Current Medications       Current Outpatient Medications:     BinaxNOW COVID-19 Ag Home Test KIT, , Disp: , Rfl:     carvedilol (COREG) 6 25 mg tablet, TAKE 1 TABLET(6 25 MG) BY MOUTH TWICE DAILY WITH MEALS, Disp: , Rfl:     Dapsone 5 % topical gel, APPLY TOPICALLY TO THE AFFECTED AREA DAILY, Disp: 90 g, Rfl: 0    lisinopril (ZESTRIL) 10 mg tablet, , Disp: , Rfl:     naproxen (NAPROSYN) 500 mg tablet, Take 1 tablet (500 mg total) by mouth in the morning and 1 tablet (500 mg total) in the evening  Take with meals  , Disp: 180 tablet, Rfl: 1    Omega-3 Fatty Acids (FISH OIL) 1,000 mg, Take 1,000 mg by mouth daily, Disp: , Rfl:     oxyCODONE-acetaminophen (PERCOCET) 5-325 mg per tablet, 1 tablet daily for back pain, Disp: 30 tablet, Rfl: 0    zolpidem (AMBIEN) 5 mg tablet, TAKE 1/2 TABLET(2 5 MG) BY MOUTH DAILY AT BEDTIME AS NEEDED FOR SLEEP, Disp: 15 tablet, Rfl: 2    Current Allergies     Allergies as of 2022 - Reviewed 2022   Allergen Reaction Noted    Cephalexin  10/25/2017    Levofloxacin Diarrhea 10/25/2017    Sulfa antibiotics Hives 06/10/2016            The following portions of the patient's history were reviewed and updated as appropriate: allergies, current medications, past family history, past medical history, past social history, past surgical history and problem list      Past Medical History:   Diagnosis Date    Chronic fatigue     last assessed 16    Endometriosis     Hypertension     Hypoglycemia     Liver lesion     last assessed 10/2/15    Migraine     Ovarian cyst     Scoliosis     last assessed  10/21/13    Varicella     childhood       Past Surgical History:   Procedure Laterality Date     SECTION      x2    ECTOPIC PREGNANCY SURGERY      LAPAROSCOPY      exploratory    [place]  DELIVERY ONLY N/A 3/26/2020    Procedure:  SECTION () REPEAT;  Surgeon: Jo Ann Rowe MD;  [facility] ;  Service: Obstetrics    SALPINGECTOMY         Family History   Problem Relation Age of Onset    Hypertension Mother     Cancer Mother         skin    Hyperlipidemia Mother     Hyperlipidemia Father     Cancer Maternal Grandfather         throat, oral, lung    Stroke Maternal Grandfather     Hypertension Sister     Heart disease Sister         MVP    Other Daughter         sickle cell trait    Diabetes Maternal Grandmother         type 2    Heart disease Maternal Grandmother     Cancer Paternal Grandmother         brain    Heart disease Paternal Grandmother         MI    Other Paternal Grandfather     Lung cancer Paternal Grandfather         dementia    Cancer Maternal Uncle         lung, prostate    Other Maternal Uncle         agent orange exp , smoker    Alcohol abuse Maternal Uncle          Medications have been verified  Objective   Pulse 83   Temp (!) 97 3 °F (36 3 °C)   Resp 14   LMP 05/12/2022   SpO2 100%   Patient's last menstrual period was 05/12/2022  Physical Exam     Physical Exam  Vitals and nursing note reviewed  Constitutional:       General: She is not in acute distress  Appearance: Normal appearance  She is not ill-appearing  HENT:      Right Ear: Tympanic membrane, ear canal and external ear normal       Left Ear: Tympanic membrane, ear canal and external ear normal       Nose: Congestion present  Right Sinus: No maxillary sinus tenderness or frontal sinus tenderness  Left Sinus: No maxillary sinus tenderness or frontal sinus tenderness  Mouth/Throat:      Mouth: Mucous membranes are moist       Pharynx: No oropharyngeal exudate or posterior oropharyngeal erythema  Cardiovascular:      Rate and Rhythm: Normal rate and regular rhythm  Heart sounds: Normal heart sounds  Pulmonary:      Effort: Pulmonary effort is normal       Breath sounds: Normal breath sounds  Lymphadenopathy:      Cervical: No cervical adenopathy  Neurological:      Mental Status: She is alert and oriented to person, place, and time  Psychiatric:         Mood and Affect: Mood normal          Behavior: Behavior normal

## 2022-05-31 ENCOUNTER — TELEMEDICINE (OUTPATIENT)
Dept: FAMILY MEDICINE CLINIC | Facility: CLINIC | Age: 42
End: 2022-05-31
Payer: COMMERCIAL

## 2022-05-31 DIAGNOSIS — J06.9 VIRAL URI WITH COUGH: Primary | ICD-10-CM

## 2022-05-31 PROCEDURE — 99213 OFFICE O/P EST LOW 20 MIN: CPT | Performed by: FAMILY MEDICINE

## 2022-05-31 NOTE — LETTER
May 31, 2022     Patient: Car Miranda  YOB: 1980  Date of Visit: 5/31/2022      To Whom it May Concern:    Toro Valenzuela is under my professional care  Sierra Kawasaki was seen in my office on 5/31/2022  Sierra Kawasaki may return to work on 06/03/22  If you have any questions or concerns, please don't hesitate to call           Sincerely,          Sanju Wiley,         CC: No Recipients

## 2022-05-31 NOTE — PROGRESS NOTES
Virtual Regular Visit    Verification of patient location:    Patient is located in the following state in which I hold an active license [place]      Assessment/Plan:    Problem List Items Addressed This Visit    None     Visit Diagnoses     Viral URI with cough    -  Primary        - Advised patient to continue symptomatic management with hydration, lozenges, steaming, humidifier   - pt has history of bacterial sinusitis and was given Augmentin in the past  At this time symptomatic management will be tried  - Can continue taking mucinex but stop sudafed given that she has history of htn  - Tylenol for fever >100 4   - call physician if o2 saturation drops below 90%   - Go to ED if experience worsening symptoms especially shortness of breath, chest pain  - follow up with pcp for annual physical          Reason for visit is   Chief Complaint   Patient presents with    Virtual Regular Visit        Encounter provider Mena Kaminski DO    Provider located at [address] [place] [id number]      Recent Visits  No visits were found meeting these conditions  Showing recent visits within past 7 days and meeting all other requirements  Today's Visits  Date Type Provider Dept   05/31/22 Telemedicine Mena Kaminski DO [facility]   Showing today's visits and meeting all other requirements  Future Appointments  No visits were found meeting these conditions  Showing future appointments within next 150 days and meeting all other requirements       The patient was identified by name and date of birth  Pamella Neptali was informed that this is a telemedicine visit and that the visit is being conducted through [address] [place] [address] Now and patient was informed that this is a secure, HIPAA-compliant platform  She agrees to proceed     My office door was closed  No one else was in the room    She acknowledged consent and understanding of privacy and security of the video platform  The patient has agreed to participate and understands they can discontinue the visit at any time  Patient is aware this is a billable service  Subjective  Luis Alfredo Biggs is a 38 y o  female    HPI   38 yr old F presents with complaint of sore throat and congestion  Pt states that symptoms have been ongoing for 1 week  States that she has now lost her voice since yesterday  Has been coughing but is unable to bring up any sputum  Has tried flonase, sudafed and mucinex without any relief  Denies any fevers  Has tested for Covid twice and both tests were negative  Pt was seen at urgent care and her covid and flu were both negative  She was exposed to her daughter who has similar symptoms  Past Medical History:   Diagnosis Date    Chronic fatigue     last assessed 16    Endometriosis     Hypertension     Hypoglycemia     Liver lesion     last assessed 10/2/15    Migraine     Ovarian cyst     Scoliosis     last assessed  10/21/13    Varicella     childhood       Past Surgical History:   Procedure Laterality Date     SECTION      x2    ECTOPIC PREGNANCY SURGERY      LAPAROSCOPY      exploratory    [place]  DELIVERY ONLY N/A 3/26/2020    Procedure:  SECTION () REPEAT;  Surgeon: Philipp Conde MD;  Location: [facility] ;  Service: Obstetrics    SALPINGECTOMY         Current Outpatient Medications   Medication Sig Dispense Refill    BinaxNOW COVID-19 Ag Home Test KIT       carvedilol (COREG) 6 25 mg tablet TAKE 1 TABLET(6 25 MG) BY MOUTH TWICE DAILY WITH MEALS      Dapsone 5 % topical gel APPLY TOPICALLY TO THE AFFECTED AREA DAILY 90 g 0    lisinopril (ZESTRIL) 10 mg tablet       naproxen (NAPROSYN) 500 mg tablet Take 1 tablet (500 mg total) by mouth in the morning and 1 tablet (500 mg total) in the evening  Take with meals   180 tablet 1    Omega-3 Fatty Acids (FISH OIL) 1,000 mg Take 1,000 mg by mouth daily      oxyCODONE-acetaminophen (PERCOCET) 5-325 mg per tablet 1 tablet daily for back pain 30 tablet 0    zolpidem (AMBIEN) 5 mg tablet TAKE 1/2 TABLET(2 5 MG) BY MOUTH DAILY AT BEDTIME AS NEEDED FOR SLEEP 15 tablet 2     No current facility-administered medications for this visit  Allergies   Allergen Reactions    Cephalexin      Chest pain    Levofloxacin Diarrhea    Sulfa Antibiotics Hives     With swelling       Review of Systems   Constitutional: Negative for fever  HENT: Positive for congestion and sore throat  Eyes: Negative for visual disturbance  Respiratory: Positive for cough  Negative for shortness of breath and wheezing  Cardiovascular: Negative for chest pain  Gastrointestinal: Negative for diarrhea and nausea  Video Exam    There were no vitals filed for this visit  Physical Exam  Neurological:      General: No focal deficit present  Mental Status: She is alert and oriented to person, place, and time  Psychiatric:         Mood and Affect: Mood normal          Behavior: Behavior normal       VIRTUAL VISIT DISCLAIMER      [place] verbally agrees to participate in [employer]  Pt is aware that [employer] could be limited without vital signs or the ability to perform a full hands-on physical Yumiko Inman understands she or the provider may request at any time to terminate the video visit and request the patient to seek care or treatment in person

## 2022-06-13 ENCOUNTER — TELEMEDICINE (OUTPATIENT)
Dept: FAMILY MEDICINE CLINIC | Facility: CLINIC | Age: 42
End: 2022-06-13
Payer: COMMERCIAL

## 2022-06-13 DIAGNOSIS — U07.1 POSITIVE SELF-ADMINISTERED ANTIGEN TEST FOR COVID-19: Primary | ICD-10-CM

## 2022-06-13 PROBLEM — R94.31 ABNORMAL EKG: Status: RESOLVED | Noted: 2019-03-26 | Resolved: 2022-06-13

## 2022-06-13 PROCEDURE — 1036F TOBACCO NON-USER: CPT | Performed by: FAMILY MEDICINE

## 2022-06-13 PROCEDURE — 99213 OFFICE O/P EST LOW 20 MIN: CPT | Performed by: FAMILY MEDICINE

## 2022-06-13 NOTE — LETTER
June 13, 2022     Patient: Carolee Officer  YOB: 1980  Date of Visit: 6/13/2022      To Whom it May Concern:    Dillharsha Thurston is under my professional care  Genasammy Vogt was seen in my office on 6/13/2022  Gena Vogt may return to work on 6/16/22  If you have any questions or concerns, please don't hesitate to call           Sincerely,          Delene Schlatter, MD

## 2022-06-13 NOTE — PROGRESS NOTES
COVID-19 Outpatient Progress Note  Assessment/Plan:    Problem List Items Addressed This Visit    None     Visit Diagnoses     Positive self-administered antigen test for COVID-19    -  Primary        Will manage the patient conservatively  Provided the patient with precautionary measures    Encounter provider Iveth David MD    Provider located at [address] [place] [id number]    Recent Visits  No visits were found meeting these conditions  Showing recent visits within past 7 days and meeting all other requirements  Today's Visits  Date Type Provider Dept   06/13/22 Telemedicine Iveth David MD [facility]   Showing today's visits and meeting all other requirements  Future Appointments  No visits were found meeting these conditions  Showing future appointments within next 150 days and meeting all other requirements     COVID-23HPI   27-year-old female with hypertension, GERD who was contacted today for evaluation of COVID symptoms  Patient noted that she had positive COVID test about 5-6 days ago  She reported that her symptoms have improved  Her symptoms started about 10-14 days ago  She denied any shortness of breath but did report positive sick contact  Cough is improving  Objective:  Physical Exam   Limited due to visit type but patient is no distress, no breathing difficulty, conversing in full sentences      VIRTUAL VISIT DISCLAIMER    [place] verbally agrees to participate in [employer]  Pt is aware that [employer] could be limited without vital signs or the ability to perform a full hands-on physical Louis Jacinto understands she or the provider may request at any time to terminate the video visit and request the patient to seek care or treatment in person      Iveth David MD  06/13/22  4:28 PM

## 2022-06-16 ENCOUNTER — TELEPHONE (OUTPATIENT)
Dept: FAMILY MEDICINE CLINIC | Facility: CLINIC | Age: 42
End: 2022-06-16

## 2022-06-30 ENCOUNTER — PROCEDURE VISIT (OUTPATIENT)
Dept: FAMILY MEDICINE CLINIC | Facility: CLINIC | Age: 42
End: 2022-06-30
Payer: COMMERCIAL

## 2022-06-30 VITALS
OXYGEN SATURATION: 100 % | TEMPERATURE: 97.6 F | WEIGHT: 126.8 LBS | DIASTOLIC BLOOD PRESSURE: 90 MMHG | SYSTOLIC BLOOD PRESSURE: 124 MMHG | HEIGHT: 63 IN | HEART RATE: 103 BPM | BODY MASS INDEX: 22.47 KG/M2 | RESPIRATION RATE: 18 BRPM

## 2022-06-30 DIAGNOSIS — M50.20 CERVICAL HERNIATED DISC: Primary | ICD-10-CM

## 2022-06-30 DIAGNOSIS — M79.18 MYOFASCIAL PAIN SYNDROME, CERVICAL: ICD-10-CM

## 2022-06-30 DIAGNOSIS — L70.0 ACNE VULGARIS: ICD-10-CM

## 2022-06-30 PROCEDURE — 99214 OFFICE O/P EST MOD 30 MIN: CPT | Performed by: FAMILY MEDICINE

## 2022-06-30 PROCEDURE — 3008F BODY MASS INDEX DOCD: CPT | Performed by: FAMILY MEDICINE

## 2022-06-30 PROCEDURE — 97813 ACUP 1/> W/ESTIM 1ST 15 MIN: CPT | Performed by: FAMILY MEDICINE

## 2022-06-30 RX ORDER — DAPSONE 50 MG/G
1 GEL TOPICAL 2 TIMES DAILY
Qty: 90 G | Refills: 1 | Status: SHIPPED | OUTPATIENT
Start: 2022-06-30

## 2022-06-30 NOTE — PROGRESS NOTES
1  Cervical herniated disc  Tx as noted    2  Myofascial pain syndrome, cervical  Tx as noted  Will slowly wean down use of Percocet 5  Presently at 30/month  Relaxercise, posture, proper biomechanics       Follow up in 1 - 2 weeks    HPI: Here for follow up of  myofascial dysfunction/pain at [address] [place] [address],[address] Floor, posterior neck    Chart reviewed for relevant medical, surgical and psychosocial history, medications and allergies  Review of systems  NO unexplained fever, chills, sweats or unexplained weight loss  NO new or unexplained chest pain or shortness of breath  NO unexplained changes in digestion or bowel movements or urination  NO red flag symptoms such as new or worsening neurological symptoms, deep bone pain in middle of night, major immunosuppressive disorder    Exam:   Alert, NAD /90   Pulse 103   Temp 97 6 °F (36 4 °C) (Tympanic)   Resp 18   Ht 5' 3" (1 6 m)   Wt 57 5 kg (126 lb 12 8 oz)   LMP 06/10/2022 Comment: [id number]  SpO2 100%   BMI 22 46 kg/m²   Pulse reg  Skin: no pallor  Respiratory: no respiratory labor  Cardiac: Regular rate  Extremities: No cyanosis, clubbing or edema  Has trigger points, tender muscle regions in areas noted  Risks and benefits of therapeutic plan discussed, answered all patient questions and concerns and patient expressed understanding and agreement of therapeutic plan  Patient gave verbal consent for acupuncture/dry needling of noted trigger points with electrostimulation/Percutaneous neuro- stimulation (PENS) as needed  Surface release technique bilateral inner scapular lines   Two needle technique 10 Hz, 15 - 20 min  Bilateral GB21      Massage  Infra-red heat lamp as comfortable    Patient tolerated procedure well  Sterile single use disposable acupuncture needles used and all needles were accounted for and disposed of in the sharps container after the procedure  Patient instructed in post-procedure care and expressed understanding        I spent 30 minutes face to face with patient

## 2022-06-30 NOTE — PATIENT INSTRUCTIONS
Balance attention between computer screen and body  When doing intellectual work, sense soles of feet  Spine gently erect like palm tree in a gentle breeze  Walk around briefly every 1/2 hr and do some extension stretches, such as 'tucking elbows in back pockets'    For [employer] Mind/body integration exercises, a good resource is www  [employer]  You can buy an MP3 download, CD's or sometimes also DVDs  Prices are subject to change  Available programs include (Navigation: 'Shop online' --> 'Shop specific themes' -->):    1  Relaxercise - a series of gentle exercises that can be very helpful for spine/neck problems    Relaxercise Audio Set $75

## 2022-07-05 ENCOUNTER — TELEPHONE (OUTPATIENT)
Dept: FAMILY MEDICINE CLINIC | Facility: CLINIC | Age: 42
End: 2022-07-05

## 2022-07-05 NOTE — TELEPHONE ENCOUNTER
[facility]    Prescriber response form     Scanned into encounter    Placed in white clinical folder    Fax [phone]

## 2022-07-13 PROBLEM — M79.18 MYOFASCIAL PAIN SYNDROME, CERVICAL: Status: ACTIVE | Noted: 2022-07-13

## 2022-07-19 DIAGNOSIS — F51.01 PRIMARY INSOMNIA: ICD-10-CM

## 2022-07-19 RX ORDER — ZOLPIDEM TARTRATE 5 MG/1
2.5 TABLET ORAL
Qty: 15 TABLET | Refills: 2 | Status: SHIPPED | OUTPATIENT
Start: 2022-07-19 | End: 2022-10-17 | Stop reason: SDUPTHER

## 2022-07-28 ENCOUNTER — PROCEDURE VISIT (OUTPATIENT)
Dept: FAMILY MEDICINE CLINIC | Facility: CLINIC | Age: 42
End: 2022-07-28
Payer: COMMERCIAL

## 2022-07-28 VITALS
OXYGEN SATURATION: 99 % | RESPIRATION RATE: 16 BRPM | WEIGHT: 124 LBS | BODY MASS INDEX: 21.97 KG/M2 | SYSTOLIC BLOOD PRESSURE: 110 MMHG | HEIGHT: 63 IN | DIASTOLIC BLOOD PRESSURE: 90 MMHG | TEMPERATURE: 98.3 F | HEART RATE: 83 BPM

## 2022-07-28 DIAGNOSIS — M79.18 MYOFASCIAL PAIN SYNDROME, CERVICAL: ICD-10-CM

## 2022-07-28 DIAGNOSIS — F41.1 GENERALIZED ANXIETY DISORDER: ICD-10-CM

## 2022-07-28 DIAGNOSIS — I10 ESSENTIAL HYPERTENSION: Primary | ICD-10-CM

## 2022-07-28 PROCEDURE — 97810 ACUP 1/> WO ESTIM 1ST 15 MIN: CPT | Performed by: FAMILY MEDICINE

## 2022-07-28 PROCEDURE — 3074F SYST BP LT 130 MM HG: CPT | Performed by: FAMILY MEDICINE

## 2022-07-28 PROCEDURE — 99214 OFFICE O/P EST MOD 30 MIN: CPT | Performed by: FAMILY MEDICINE

## 2022-07-28 PROCEDURE — 3080F DIAST BP >= 90 MM HG: CPT | Performed by: FAMILY MEDICINE

## 2022-07-28 NOTE — PROGRESS NOTES
1  Essential hypertension  bp well controlled    2  Myofascial pain syndrome, cervical  tx as noted    3  Generalized anxiety disorder  stable       Follow up in 1 - 2 weeks    HPI: Here for follow up of  myofascial dysfunction/pain at neck  Last treatment considered very effective, just a a little flare now after a stressful day  Around her period and her body feels 'sensitive' today to needles, so mostly did acupressure, gua sha    Chart reviewed for relevant medical, surgical and psychosocial history, medications and allergies  Review of systems  NO unexplained fever, chills, sweats or unexplained weight loss  NO new or unexplained chest pain or shortness of breath  NO unexplained changes in digestion or bowel movements or urination  NO red flag symptoms such as new or worsening neurological symptoms, deep bone pain in middle of night, major immunosuppressive disorder    Exam:   Alert, NAD /90 (BP Location: Left arm, Patient Position: Sitting, Cuff Size: Standard)   Pulse 83   Temp 98 3 °F (36 8 °C) (Tympanic)   Resp 16   Ht 5' 3" (1 6 m)   Wt 56 2 kg (124 lb)   SpO2 99%   BMI 21 97 kg/m²   Skin: no pallor  Respiratory: no respiratory labor  Cardiac: Regular rate  Extremities: No cyanosis, clubbing or edema  Has trigger points, tender muscle regions in areas noted  Risks and benefits of therapeutic plan discussed, answered all patient questions and concerns and patient expressed understanding and agreement of therapeutic plan  Patient gave verbal consent for acupuncture/dry needling of noted trigger points with electrostimulation/Percutaneous neuro- stimulation (PENS) as needed  SRT L medial scapula  Massage,  Gua sha  Infra-red heat lamp as comfortable    Patient tolerated procedure well  Sterile single use disposable acupuncture needles used and all needles were accounted for and disposed of in the sharps container after the procedure     Patient instructed in post-procedure care and expressed understanding        I spent 30 minutes face to face with patient

## 2022-08-25 ENCOUNTER — OFFICE VISIT (OUTPATIENT)
Dept: FAMILY MEDICINE CLINIC | Facility: CLINIC | Age: 42
End: 2022-08-25
Payer: COMMERCIAL

## 2022-08-25 VITALS
DIASTOLIC BLOOD PRESSURE: 68 MMHG | OXYGEN SATURATION: 99 % | HEIGHT: 63 IN | BODY MASS INDEX: 22.29 KG/M2 | WEIGHT: 125.8 LBS | RESPIRATION RATE: 18 BRPM | TEMPERATURE: 98.2 F | SYSTOLIC BLOOD PRESSURE: 104 MMHG | HEART RATE: 92 BPM

## 2022-08-25 DIAGNOSIS — F41.1 GENERALIZED ANXIETY DISORDER: ICD-10-CM

## 2022-08-25 DIAGNOSIS — Z00.00 ANNUAL PHYSICAL EXAM: ICD-10-CM

## 2022-08-25 DIAGNOSIS — L70.0 ACNE VULGARIS: ICD-10-CM

## 2022-08-25 DIAGNOSIS — I10 ESSENTIAL HYPERTENSION: Primary | ICD-10-CM

## 2022-08-25 DIAGNOSIS — E61.1 IRON DEFICIENCY: ICD-10-CM

## 2022-08-25 PROCEDURE — 99396 PREV VISIT EST AGE 40-64: CPT | Performed by: FAMILY MEDICINE

## 2022-08-25 PROCEDURE — 3074F SYST BP LT 130 MM HG: CPT | Performed by: FAMILY MEDICINE

## 2022-08-25 PROCEDURE — 3078F DIAST BP <80 MM HG: CPT | Performed by: FAMILY MEDICINE

## 2022-08-25 RX ORDER — AMOXICILLIN 500 MG/1
TABLET, FILM COATED ORAL
COMMUNITY
Start: 2022-08-17 | End: 2022-08-25 | Stop reason: ALTCHOICE

## 2022-08-25 RX ORDER — TRETINOIN 0.5 MG/G
CREAM TOPICAL
Qty: 45 G | Refills: 0 | Status: SHIPPED | OUTPATIENT
Start: 2022-08-25 | End: 2022-10-17 | Stop reason: SDUPTHER

## 2022-08-25 NOTE — PROGRESS NOTES
[facility]    NAME: Yakelin Bay  AGE: 38 y o  SEX: female  : 1980     DATE: 2022     Assessment and Plan:     Problem List Items Addressed This Visit        Cardiovascular and Mediastinum    Essential hypertension - Primary       Other    Generalized anxiety disorder      Other Visit Diagnoses     Annual physical exam        Acne vulgaris        Relevant Medications    tretinoin (Retin-A) 0 05 % cream    Iron deficiency        Relevant Orders    Ferritin        Fill Date ID   Written Drug Qty Days Prescriber Rx # Pharmacy Refill   Daily Dose* Pymt Type      2022  1   2022  Zolpidem Tartrate 5 MG Tablet    15 00  30 Al Rem   [id number]   [id number]   [place]   2022  2   2022  Zolpidem Tartrate 5 MG Tablet    15 00  30 Al Rem   [id number]   [facility] ([id number]   1           Immunizations and preventive care screenings were discussed with patient today  Appropriate education was printed on patient's after visit summary  Counseling:  Alcohol/drug use: discussed moderation in alcohol intake, the recommendations for healthy alcohol use, and avoidance of illicit drug use  Dental Health: discussed importance of regular tooth brushing, flossing, and dental visits  Injury prevention: discussed safety/seat belts, safety helmets, smoke detectors, carbon dioxide detectors, and smoking near bedding or upholstery  Sexual health: discussed sexually transmitted diseases, partner selection, use of condoms, avoidance of unintended pregnancy, and contraceptive alternatives  Exercise: the importance of regular exercise/physical activity was discussed  Recommend exercise 3-5 times per week for at least 30 minutes  Return in about 3 months (around 2022) for Chronic dis   f/u 1/2 hr      Chief Complaint:     Chief Complaint   Patient presents with    Physical Exam     No acute concerns      History of Present Illness:     Adult Annual Physical   Patient here for a comprehensive physical exam  The patient reports no problems  Diet and Physical Activity  Diet/Nutrition: well balanced diet  Little red meat  Exercise: light stretching, yoga, walking  Depression Screening  PHQ-2/9 Depression Screening         General Health  Sleep: sometimes sleeps poorly  Occasional rumination  Hearing: normal - bilateral   Vision: wears glasses  Dental: regular dental visits and brushes teeth three times daily  /GYN Health  Patient is: premenopausal  Last menstrual period:   Contraceptive method: none  Review of Systems:     Review of Systems   Constitutional: No fever, chills or sweats  HEENT: No eye or ear symptoms, sore throat, congested nose  Cardiorespiratory: No chest pain or shortness of breath  Abdomen/Gastrointestinal: No abdominal pain or unusual change digestion or in bowel movements  Genitourinary: No change in urination  Neuromuscular: No new neurological symptoms or unexplained/new achy joints or muscles  Psych: No suicidal ideation     Past Medical History:     Past Medical History:   Diagnosis Date    Chronic fatigue     last assessed 16    Endometriosis     GERD (gastroesophageal reflux disease) 22 Intermittent, especially when was pregnant   [id number] now    Hypoglycemia     Migraine     Ovarian cyst     Scoliosis     last assessed  10/21/13    Varicella     childhood      Past Surgical History:     Past Surgical History:   Procedure Laterality Date     SECTION      x2    ECTOPIC PREGNANCY SURGERY      LAPAROSCOPY      exploratory    AL  DELIVERY ONLY N/A 3/26/2020    Procedure:  SECTION () REPEAT;  Surgeon: Astrid Sommer MD;  Location: [facility] ;  Service: Obstetrics    SALPINGECTOMY        Social History:     Social History     Socioeconomic History    Marital status: /Civil Union     Spouse name: None    Number of children: None    Years of education: None    Highest education level: None   Occupational History    Occupation: [employer]     Comment: at night    Occupation: Nurse, 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   Tobacco Use    Smoking status: Former Smoker     Packs/day: 0 25     Years: 2 00     Pack years: 0 50    Smokeless tobacco: Never Used   Vaping Use    Vaping Use: Never used   Substance and Sexual Activity    Alcohol use: Yes     Comment: once a year    Drug use: No    Sexual activity: Yes     Partners: Male   Other Topics Concern    None   Social History Narrative    None     Social Determinants of Health     Financial Resource Strain: Not on file   Food Insecurity: Not on file   Transportation Needs: Not on file   Physical Activity: Not on file   Stress: Not on file   Social Connections: Not on file   Intimate Partner Violence: Not on file   Housing Stability: Not on file      Family History:     Family History   Problem Relation Age of Onset    Hypertension Mother     Cancer Mother         skin    Hyperlipidemia Mother     Hyperlipidemia Father     Cancer Maternal Grandfather         throat, oral, lung    Stroke Maternal Grandfather     Hypertension Sister     Heart disease Sister         MVP    Other Daughter         sickle cell trait    Diabetes Maternal Grandmother         type 2    Heart disease Maternal Grandmother     Cancer Paternal Grandmother         brain    Heart disease Paternal Grandmother         MI    Other Paternal Grandfather     Lung cancer Paternal Grandfather         dementia    Cancer Maternal Uncle         lung, prostate    Other Maternal Uncle         agent orange exp , smoker    Alcohol abuse Maternal Uncle       Current Medications:     Current Outpatient Medications   Medication Sig Dispense Refill    carvedilol (COREG) 6 25 mg tablet TAKE 1 TABLET(6 25 MG) BY MOUTH TWICE DAILY WITH MEALS      lisinopril (ZESTRIL) 10 mg tablet       Omega-3 Fatty Acids (FISH OIL) 1,000 mg Take 1,000 mg by mouth daily      tretinoin (Retin-A) 0 05 % cream Apply topically daily at bedtime 45 g 0    zolpidem (AMBIEN) 5 mg tablet Take 0 5 tablets (2 5 mg total) by mouth daily at bedtime as needed for sleep Use sparingly  Can be habit forming  15 tablet 2    naproxen (NAPROSYN) 500 mg tablet Take 1 tablet (500 mg total) by mouth in the morning and 1 tablet (500 mg total) in the evening  Take with meals  180 tablet 1     No current facility-administered medications for this visit  Allergies: Allergies   Allergen Reactions    Cephalexin      Chest pain    Levofloxacin Diarrhea    Sulfa Antibiotics Hives     With swelling      Physical Exam:     /68   Pulse 92   Temp 98 2 °F (36 8 °C) (Tympanic)   Resp 18   Ht 5' 3" (1 6 m)   Wt 57 1 kg (125 lb 12 8 oz)   SpO2 99%   BMI 22 28 kg/m²     Physical Exam   Alert, No acute distress  /68   Pulse 92   Temp 98 2 °F (36 8 °C) (Tympanic)   Resp 18   Ht 5' 3" (1 6 m)   Wt 57 1 kg (125 lb 12 8 oz)   SpO2 99%   BMI 22 28 kg/m²     Head, ears, eyes, nose, throat, neck:  Mild papular acneiform changes forehead  Head atraumatic, normocephalic  Conjunctiva clear, pupils equal and reactive to light  Throat: within normal limits  Tympanic membranes clear  Neck supple without concerning nodes, masses or thyromegaly    Respiratory: No respiratory distress   Lungs clear to auscultation  Cardiac: Heart regular rate and rhythm, normal S1 and S2, no murmur  Abdomen benign Soft and non-tender  Extremities: no cyanosis, clubbing or edema  Some mild scoliosis and left rhomboid muscle spasm           Jay Ibarra MD  [address]

## 2022-08-25 NOTE — PATIENT INSTRUCTIONS
Daily meditation  Aerobic exercise    For Shawn Mind/body integration exercises, a good resource is www  [employer]  You can buy an MP3 download, CD's or sometimes also DVDs  Prices are subject to change  Available programs include (Navigation: 'Shop online' --> 'Shop specific themes' -->):    1  Relaxercise - a series of gentle exercises that can be very helpful for spine/neck problems  Relaxercise Audio Set $75                To treat iron deficiency anemia, please do the followin  Eat foods that are healthy and naturally rich in iron, such as lean meats, poultry, fish/shellfish, green leafy vegetables, beets  2  Cooking out of iron pots may also boost your iron levels  2 3  Alternately, can use a natural herbal source of iron such as Floradix (follow directions on bottle)    Wellness Visit for Adults   AMBULATORY CARE:   A wellness visit  is when you see your healthcare provider to get screened for health problems  Your healthcare provider will also give you advice on how to stay healthy  Write down your questions so you remember to ask them  Ask your healthcare provider how often you should have a wellness visit  What happens at a wellness visit:  Your healthcare provider will ask about your health, and your family history of health problems  This includes high blood pressure, heart disease, and cancer  He or she will ask if you have symptoms that concern you, if you smoke, and about your mood  You may also be asked about your intake of medicines, supplements, food, and alcohol  Any of the following may be done: Your weight  will be checked  Your height may also be checked so your body mass index (BMI) can be calculated  Your BMI shows if you are at a healthy weight  Your blood pressure  and heart rate will be checked  Your temperature may also be checked  Blood and urine tests  may be done  Blood tests may be done to check your cholesterol levels   Abnormal cholesterol levels increase your risk for heart disease and stroke  You may also need a blood or urine test to check for diabetes if you are at increased risk  Urine tests may be done to look for signs of an infection or kidney disease  A physical exam  includes checking your heartbeat and lungs with a stethoscope  Your healthcare provider may also check your skin to look for sun damage  Screening tests  may be recommended  A screening test is done to check for diseases that may not cause symptoms  The screening tests you may need depend on your age, gender, family history, and lifestyle habits  For example, colorectal screening may be recommended if you are 48years old or older  Screening tests you need if you are a woman:   A Pap smear  is used to screen for cervical cancer  Pap smears are usually done every 3 to 5 years depending on your age  You may need them more often if you have had abnormal Pap smear test results in the past  Ask your healthcare provider how often you should have a Pap smear  A mammogram  is an x-ray of your breasts to screen for breast cancer  Experts recommend mammograms every 2 years starting at age 48 years  You may need a mammogram at age 52 years or younger if you have an increased risk for breast cancer  Talk to your healthcare provider about when you should start having mammograms and how often you need them  Vaccines you may need:   Get an influenza vaccine  every year  The influenza vaccine protects you from the flu  Several types of viruses cause the flu  The viruses change over time, so new vaccines are made each year  Get a tetanus-diphtheria (Td) booster vaccine  every 10 years  This vaccine protects you against tetanus and diphtheria  Tetanus is a severe infection that may cause painful muscle spasms and lockjaw  Diphtheria is a severe bacterial infection that causes a thick covering in the back of your mouth and throat      Get a human papillomavirus (HPV) vaccine  if you are female and aged 23 to 32 or male 23 to 24 and never received it  This vaccine protects you from HPV infection  HPV is the most common infection spread by sexual contact  HPV may also cause vaginal, penile, and anal cancers  Get a pneumococcal vaccine  if you are aged 72 years or older  The pneumococcal vaccine is an injection given to protect you from pneumococcal disease  Pneumococcal disease is an infection caused by pneumococcal bacteria  The infection may cause pneumonia, meningitis, or an ear infection  Get a shingles vaccine  if you are 60 or older, even if you have had shingles before  The shingles vaccine is an injection to protect you from the varicella-zoster virus  This is the same virus that causes chickenpox  Shingles is a painful rash that develops in people who had chickenpox or have been exposed to the virus  How to eat healthy:  My Plate is a model for planning healthy meals  It shows the types and amounts of foods that should go on your plate  Fruits and vegetables make up about half of your plate, and grains and protein make up the other half  A serving of dairy is included on the side of your plate  The amount of calories and serving sizes you need depends on your age, gender, weight, and height  Examples of healthy foods are listed below:  Eat a variety of vegetables  such as dark green, red, and orange vegetables  You can also include canned vegetables low in sodium (salt) and frozen vegetables without added butter or sauces  Eat a variety of fresh fruits , canned fruit in 100% juice, frozen fruit, and dried fruit  Include whole grains  At least half of the grains you eat should be whole grains  Examples include whole-wheat bread, wheat pasta, brown rice, and whole-grain cereals such as oatmeal     Eat a variety of protein foods such as seafood (fish and shellfish), lean meat, and poultry without skin (turkey and chicken)   Examples of lean meats include pork leg, shoulder, or tenderloin, and beef round, sirloin, tenderloin, and extra lean ground beef  Other protein foods include eggs and egg substitutes, beans, peas, soy products, nuts, and seeds  Choose low-fat dairy products such as skim or 1% milk or low-fat yogurt, cheese, and cottage cheese  Limit unhealthy fats  such as butter, hard margarine, and shortening  Exercise:  Exercise at least 30 minutes per day on most days of the week  Some examples of exercise include walking, biking, dancing, and swimming  You can also fit in more physical activity by taking the stairs instead of the elevator or parking farther away from stores  Include muscle strengthening activities 2 days each week  Regular exercise provides many health benefits  It helps you manage your weight, and decreases your risk for type 2 diabetes, heart disease, stroke, and high blood pressure  Exercise can also help improve your mood  Ask your healthcare provider about the best exercise plan for you  General health and safety guidelines:   Do not smoke  Nicotine and other chemicals in cigarettes and cigars can cause lung damage  Ask your healthcare provider for information if you currently smoke and need help to quit  E-cigarettes or smokeless tobacco still contain nicotine  Talk to your healthcare provider before you use these products  Limit alcohol  A drink of alcohol is 12 ounces of beer, 5 ounces of wine, or 1½ ounces of liquor  Lose weight, if needed  Being overweight increases your risk of certain health conditions  These include heart disease, high blood pressure, type 2 diabetes, and certain types of cancer  Protect your skin  Do not sunbathe or use tanning beds  Use sunscreen with a SPF 15 or higher  Apply sunscreen at least 15 minutes before you go outside  Reapply sunscreen every 2 hours  Wear protective clothing, hats, and sunglasses when you are outside  Drive safely  Always wear your seatbelt   Make sure everyone in your car wears a seatbelt  A seatbelt can save your life if you are in an accident  Do not use your cell phone when you are driving  This could distract you and cause an accident  Pull over if you need to make a call or send a text message  Practice safe sex  Use latex condoms if are sexually active and have more than one partner  Your healthcare provider may recommend screening tests for sexually transmitted infections (STIs)  Wear helmets, lifejackets, and protective gear  Always wear a helmet when you ride a bike or motorcycle, go skiing, or play sports that could cause a head injury  Wear protective equipment when you play sports  Wear a lifejacket when you are on a boat or doing water sports  © Copyright [employer] 2022 Information is for End User's use only and may not be sold, redistributed or otherwise used for commercial purposes  All illustrations and images included in CareNotes® are the copyrighted property of A D A M , Inc  or [facility] Farrah Omalley   The above information is an  only  It is not intended as medical advice for individual conditions or treatments  Talk to your doctor, nurse or pharmacist before following any medical regimen to see if it is safe and effective for you

## 2022-08-30 ENCOUNTER — TELEPHONE (OUTPATIENT)
Dept: FAMILY MEDICINE CLINIC | Facility: CLINIC | Age: 42
End: 2022-08-30

## 2022-10-13 ENCOUNTER — PROCEDURE VISIT (OUTPATIENT)
Dept: FAMILY MEDICINE CLINIC | Facility: CLINIC | Age: 42
End: 2022-10-13
Payer: COMMERCIAL

## 2022-10-13 VITALS
HEIGHT: 63 IN | DIASTOLIC BLOOD PRESSURE: 70 MMHG | TEMPERATURE: 98.6 F | OXYGEN SATURATION: 95 % | BODY MASS INDEX: 22.18 KG/M2 | HEART RATE: 95 BPM | SYSTOLIC BLOOD PRESSURE: 92 MMHG | WEIGHT: 125.2 LBS | RESPIRATION RATE: 16 BRPM

## 2022-10-13 DIAGNOSIS — M79.18 MYOFASCIAL PAIN SYNDROME, CERVICAL: Primary | ICD-10-CM

## 2022-10-13 DIAGNOSIS — D50.8 IRON DEFICIENCY ANEMIA SECONDARY TO INADEQUATE DIETARY IRON INTAKE: Primary | ICD-10-CM

## 2022-10-13 PROCEDURE — 97813 ACUP 1/> W/ESTIM 1ST 15 MIN: CPT | Performed by: FAMILY MEDICINE

## 2022-10-13 PROCEDURE — 99214 OFFICE O/P EST MOD 30 MIN: CPT | Performed by: FAMILY MEDICINE

## 2022-10-13 NOTE — PROGRESS NOTES
1  Myofascial pain syndrome, cervical  Tx as noted  Use zolpidem sparingly only    Will be getting her COVID and Flu  Vaccines at her job next week    Follow up in 1 - 2 weeks    HPI: Here for follow up of  myofascial dysfunction/pain at shoulder ridges  Her posterior neck and rhomboid areas are much better  Chart reviewed for relevant medical, surgical and psychosocial history, medications and allergies  Review of systems  NO unexplained fever, chills, sweats or unexplained weight loss  NO new or unexplained chest pain or shortness of breath  NO unexplained changes in digestion or bowel movements or urination  NO red flag symptoms such as new or worsening neurological symptoms, deep bone pain in middle of night, major immunosuppressive disorder    Exam:   Alert, NAD BP 92/70 (BP Location: Left arm, Patient Position: Sitting, Cuff Size: Standard)   Pulse 95   Temp 98 6 °F (37 °C) (Tympanic)   Resp 16   Ht 5' 3" (1 6 m)   Wt 56 8 kg (125 lb 3 2 oz)   SpO2 95%   BMI 22 18 kg/m²   Skin: no pallor  Respiratory: no respiratory labor  Cardiac: Regular rate  Extremities: No cyanosis, clubbing or edema  Has trigger points, tender muscle regions in areas noted  Risks and benefits of therapeutic plan discussed, answered all patient questions and concerns and patient expressed understanding and agreement of therapeutic plan  Patient gave verbal consent for acupuncture/dry needling of noted trigger points with electrostimulation/Percutaneous neuro- stimulation (PENS) as needed  Surface release technique trapezial ridge     Two needle technique 10 Hz, 15 - 20 min  at bilateral GB21    Massage  Infra-red heat lamp as comfortable    Patient tolerated procedure well  Sterile single use disposable acupuncture needles used and all needles were accounted for and disposed of in the sharps container after the procedure  Patient instructed in post-procedure care and expressed understanding        I spent 30 minutes face to face with patient

## 2022-10-17 DIAGNOSIS — L70.0 ACNE VULGARIS: ICD-10-CM

## 2022-10-17 DIAGNOSIS — F51.01 PRIMARY INSOMNIA: ICD-10-CM

## 2022-10-18 RX ORDER — ZOLPIDEM TARTRATE 5 MG/1
2.5 TABLET ORAL
Qty: 15 TABLET | Refills: 0 | Status: SHIPPED | OUTPATIENT
Start: 2022-10-18

## 2022-10-18 RX ORDER — TRETINOIN 0.5 MG/G
CREAM TOPICAL
Qty: 45 G | Refills: 0 | Status: SHIPPED | OUTPATIENT
Start: 2022-10-18

## 2022-11-16 ENCOUNTER — TELEPHONE (OUTPATIENT)
Dept: FAMILY MEDICINE CLINIC | Facility: CLINIC | Age: 42
End: 2022-11-16

## 2022-11-16 NOTE — TELEPHONE ENCOUNTER
Patient calling in and stated referral in chart for mammogram is not correct for Dr she is going too   Dr office stated they need a script with Diagnostic screening on it in order to do mammogram    Dr Joseph Mai Fax: [phone]

## 2022-11-18 ENCOUNTER — TELEPHONE (OUTPATIENT)
Dept: FAMILY MEDICINE CLINIC | Facility: CLINIC | Age: 42
End: 2022-11-18

## 2022-11-18 DIAGNOSIS — N63.20 MASS OF LEFT BREAST, UNSPECIFIED QUADRANT: Primary | ICD-10-CM

## 2022-11-21 ENCOUNTER — TELEPHONE (OUTPATIENT)
Dept: FAMILY MEDICINE CLINIC | Facility: CLINIC | Age: 42
End: 2022-11-21

## 2022-11-21 NOTE — TELEPHONE ENCOUNTER
Bhumika from [id number] called and needs a new order with doctor signature for left breast diagnostic order faxed to [phone]

## 2022-11-22 DIAGNOSIS — L70.0 ACNE VULGARIS: ICD-10-CM

## 2022-11-22 DIAGNOSIS — F51.01 PRIMARY INSOMNIA: ICD-10-CM

## 2022-11-23 RX ORDER — TRETINOIN 0.5 MG/G
CREAM TOPICAL
Qty: 45 G | Refills: 0 | Status: SHIPPED | OUTPATIENT
Start: 2022-11-23

## 2022-11-23 RX ORDER — ZOLPIDEM TARTRATE 5 MG/1
2.5 TABLET ORAL
Qty: 15 TABLET | Refills: 0 | Status: SHIPPED | OUTPATIENT
Start: 2022-11-23

## 2022-11-30 DIAGNOSIS — Z20.828 EXPOSURE TO INFLUENZA: Primary | ICD-10-CM

## 2022-11-30 RX ORDER — OSELTAMIVIR PHOSPHATE 75 MG/1
75 CAPSULE ORAL DAILY
Qty: 7 CAPSULE | Refills: 0 | Status: SHIPPED | OUTPATIENT
Start: 2022-11-30 | End: 2022-12-07

## 2022-12-22 DIAGNOSIS — F51.01 PRIMARY INSOMNIA: ICD-10-CM

## 2022-12-22 RX ORDER — ZOLPIDEM TARTRATE 5 MG/1
2.5 TABLET ORAL
Qty: 15 TABLET | Refills: 0 | Status: SHIPPED | OUTPATIENT
Start: 2022-12-22

## 2023-01-03 NOTE — PROGRESS NOTES
1  Essential hypertension  Blood pressure well controlled    2  Myofascial pain syndrome, cervical  Treatment as noted    3  Generalized anxiety disorder  Continue daily self-care  Discussed stress management  Follow up in 1 - 2 weeks    HPI: Here for follow up of  myofascial dysfunction/pain at left upper back mainly today  Chart reviewed for relevant medical, surgical and psychosocial history, medications and allergies  Review of systems  NO unexplained fever, chills, sweats or unexplained weight loss  NO new or unexplained chest pain or shortness of breath  NO unexplained changes in digestion or bowel movements or urination  NO red flag symptoms such as new or worsening neurological symptoms, deep bone pain in middle of night, major immunosuppressive disorder    Exam:   Alert, NAD /62 (BP Location: Left arm, Patient Position: Sitting, Cuff Size: Standard)   Pulse 72   Temp 98 8 °F (37 1 °C)   Resp 16   Ht 5' 2" (1 575 m)   Wt 56 5 kg (124 lb 9 6 oz)   SpO2 94%   BMI 22 79 kg/m²   Pulse  HEENT bioenergetic findings: Face:   Tongue:   Skin: no pallor  Respiratory: no respiratory labor  Cardiac: Regular rate  Extremities: No cyanosis, clubbing or edema  Has trigger points, tender muscle regions in areas noted  Risks and benefits of therapeutic plan discussed, answered all patient questions and concerns and patient expressed understanding and agreement of therapeutic plan  Patient gave verbal consent for acupuncture/dry needling of noted trigger points with electrostimulation/Percutaneous neuro- stimulation (PENS) as needed  Surface release technique L interscapular with Gua sha          Massage, Gua sha  Infra-red heat lamp as comfortable    Patient tolerated procedure well  Sterile single use disposable acupuncture needles used and all needles were accounted for and disposed of in the sharps container after the procedure     Patient instructed in post-procedure care and expressed understanding        I spent 30 minutes face to face with patient

## 2023-01-05 ENCOUNTER — PROCEDURE VISIT (OUTPATIENT)
Dept: FAMILY MEDICINE CLINIC | Facility: CLINIC | Age: 43
End: 2023-01-05

## 2023-01-05 VITALS
HEART RATE: 72 BPM | BODY MASS INDEX: 22.93 KG/M2 | TEMPERATURE: 98.8 F | OXYGEN SATURATION: 94 % | DIASTOLIC BLOOD PRESSURE: 62 MMHG | WEIGHT: 124.6 LBS | HEIGHT: 62 IN | SYSTOLIC BLOOD PRESSURE: 112 MMHG | RESPIRATION RATE: 16 BRPM

## 2023-01-05 DIAGNOSIS — I10 ESSENTIAL HYPERTENSION: ICD-10-CM

## 2023-01-05 DIAGNOSIS — F41.1 GENERALIZED ANXIETY DISORDER: ICD-10-CM

## 2023-01-05 DIAGNOSIS — Z23 ENCOUNTER FOR IMMUNIZATION: ICD-10-CM

## 2023-01-05 DIAGNOSIS — Z12.31 ENCOUNTER FOR SCREENING MAMMOGRAM FOR BREAST CANCER: ICD-10-CM

## 2023-01-05 DIAGNOSIS — M79.18 MYOFASCIAL PAIN SYNDROME, CERVICAL: Primary | ICD-10-CM

## 2023-01-05 RX ORDER — IBUPROFEN 800 MG/1
TABLET ORAL EVERY 6 HOURS PRN
COMMUNITY

## 2023-01-05 RX ORDER — ACETAMINOPHEN 325 MG/1
650 TABLET ORAL EVERY 6 HOURS PRN
COMMUNITY

## 2023-01-09 ENCOUNTER — OFFICE VISIT (OUTPATIENT)
Dept: DERMATOLOGY | Age: 43
End: 2023-01-09

## 2023-01-09 VITALS — HEIGHT: 62 IN | WEIGHT: 126 LBS | BODY MASS INDEX: 23.19 KG/M2

## 2023-01-09 DIAGNOSIS — D23.9 DERMATOFIBROMA: ICD-10-CM

## 2023-01-09 DIAGNOSIS — D22.9 MULTIPLE MELANOCYTIC NEVI: Primary | ICD-10-CM

## 2023-01-09 NOTE — PATIENT INSTRUCTIONS
1 MELANOCYTIC NEVI ("Moles")    Physical Exam:  Anatomic Location Affected:   Mostly on sun-exposed areas of the trunk and extremities      Assessment and Plan:  Based on a thorough discussion of this condition and the management approach to it (including a comprehensive discussion of the known risks, side effects and potential benefits of treatment), the patient (family) agrees to implement the following specific plan:  When outside we recommend using a wide brim hat, sunglasses, long sleeve and pants, sunscreen with SPF 79+ with reapplication every 2 hours, or SPF specific clothing   Benign, reassured  Annual skin check  recommended some moles are slightly atypical      Melanocytic Nevi  Melanocytic nevi ("moles") are tan or brown, raised or flat areas of the skin which have an increased number of melanocytes  Melanocytes are the cells in our body which make pigment and account for skin color  Some moles are present at birth (I e , "congenital nevi"), while others come up later in life (i e , "acquired nevi")  The sun can stimulate the body to make more moles  Sunburns are not the only thing that triggers more moles  Chronic sun exposure can do it too  Clinically distinguishing a healthy mole from melanoma may be difficult, even for experienced dermatologists  The "ABCDE's" of moles have been suggested as a means of helping to alert a person to a suspicious mole and the possible increased risk of melanoma  The suggestions for raising alert are as follows:    Asymmetry: Healthy moles tend to be symmetric, while melanomas are often asymmetric  Asymmetry means if you draw a line through the mole, the two halves do not match in color, size, shape, or surface texture  Asymmetry can be a result of rapid enlargement of a mole, the development of a raised area on a previously flat lesion, scaling, ulceration, bleeding or scabbing within the mole    Any mole that starts to demonstrate "asymmetry" should be examined promptly by a board certified dermatologist      Alissa Baez: Healthy moles tend to have discrete, even borders  The border of a melanoma often blends into the normal skin and does not sharply delineate the mole from normal skin  Any mole that starts to demonstrate "uneven borders" should be examined promptly by a board certified dermatologist      Color: Healthy moles tend to be one color throughout  Melanomas tend to be made up of different colors ranging from dark black, blue, white, or red  Any mole that demonstrates a color change should be examined promptly by a board certified dermatologist      Diameter: Healthy moles tend to be smaller than 0 6 cm in size; an exception are "congenital nevi" that can be larger  Melanomas tend to grow and can often be greater than 0 6 cm (1/4 of an inch, or the size of a pencil eraser)  This is only a guideline, and many normal moles may be larger than 0 6 cm without being unhealthy  Any mole that starts to change in size (small to bigger or bigger to smaller) should be examined promptly by a board certified dermatologist      Evolving: Healthy moles tend to "stay the same "  Melanomas may often show signs of change or evolution such as a change in size, shape, color, or elevation    Any mole that starts to itch, bleed, crust, burn, hurt, or ulcerate or demonstrate a change or evolution should be examined promptly by a board certified dermatologist       Ashley Carter    Physical Exam:  Anatomic Location Affected:  Right forearm       Assessment and Plan:  Based on a thorough discussion of this condition and the management approach to it (including a comprehensive discussion of the known risks, side effects and potential benefits of treatment), the patient (family) agrees to implement the following specific plan:  Reassured benign   Monitor for changes     Assessment and Plan:  A dermatofibroma is a common benign fibrous nodule that most often arises on the skin of the lower legs  A dermatofibroma is also called a "cutaneous fibrous histiocytoma "  Dermatofibromas occur at all ages and in people of every ethnicity  They are more common in women than in men  It is not clear if dermatofibroma is a reactive process or if it is a neoplasm  The lesions are made up of proliferating fibroblasts  Histiocytes may also be involved  They are sometimes attributed to an insect bite or ingrownhair or local trauma, but not consistently  They may be more numerous in patients with altered immunity  Dermatofibromas most often occur on the legs and arms, but may also arise on the trunk or any site of the body  Typical clinical features include the following:  People may have 1 or up to 15 lesions  Size varies from 0 5-1 5 cm diameter; most lesions are 7-10 mm diameter  They are firm nodules tethered to the skin surface and mobile over subcutaneous tissue  The skin "dimples" on pinching the lesion  Color may be pink to light brown in white skin, and dark brown to black in dark skin; some appear paler in the center  They do not usually cause symptoms, but they are sometimes painful or itchy  Because they are often raised lesions, they may be traumatized, for example by a razor  Occasionally dozens may erupt within a few months, usually in the setting of immunosuppression (for example autoimmune disease, cancer or certain medications)  Dermatofibroma does not give rise to cancer  However, occasionally, it may be mistaken for dermatofibrosarcoma or desmoplastic melanoma  A dermatofibroma is harmless and seldom causes any symptoms  Usually, only reassurance is needed  If it is nuisance or causing concern, the lesion can be removed surgically, resulting in a scar that is, by definition, usually longer in diameter than the widest portion of the dermatofibroma  Cryotherapy, shave biopsy and laser surgery are rarely completely successful    Skin punch biopsy or incisional biopsy may be undertaken if there is an atypical feature such as recent enlargement, ulceration, or asymmetrical structures and colours on dermatoscopy

## 2023-01-09 NOTE — PROGRESS NOTES
[facility] Note     Patient Name: Nathan Ware  Encounter Date: 01 09 2023     Have you been cared for by a [facility] Dermatologist in the last 3 years and, if so, which description applies to you? NO  I am considered a "new" patient and must complete all patient intake questions  I am FEMALE/of child-bearing potential     REVIEW OF SYSTEMS:  Have you recently had or currently have any of the following? · Recent fever or chills? No  · Any non-healing wound? No  · Are you pregnant or planning to become pregnant? No  · Are you currently or planning to be nursing or breast feeding? No   PAST MEDICAL HISTORY:  Have you personally ever had or currently have any of the following? If "YES," then please provide more detail  · Skin cancer (such as Melanoma, Basal Cell Carcinoma, Squamous Cell Carcinoma? No  · Tuberculosis, HIV/AIDS, Hepatitis B or C: No  · Systemic Immunosuppression such as Diabetes, Biologic or Immunotherapy, Chemotherapy, Organ Transplantation, Bone Marrow Transplantation No  · Radiation Treatment No   FAMILY HISTORY:  Any "first degree relatives" (parent, brother, sister, or child) with the following? • Skin Cancer, Pancreatic or Other Cancer? No   PATIENT EXPERIENCE:    • Do you want the Dermatologist to perform a COMPLETE skin exam today including a clinical examination under the "bra and underwear" areas? Yes  • If necessary, do we have your permission to call and leave a detailed message on your Preferred Phone number that includes your specific medical information?   Yes      Allergies   Allergen Reactions   • Cephalexin      Chest pain   • Levofloxacin Diarrhea   • Sulfa Antibiotics Hives     With swelling      Current Outpatient Medications:   •  acetaminophen (TYLENOL) 325 mg tablet, Take 650 mg by mouth every 6 (six) hours as needed for mild pain, Disp: , Rfl:   •  carvedilol (COREG) 6 25 mg tablet, TAKE 1 TABLET(6 25 MG) BY MOUTH TWICE DAILY WITH MEALS, Disp: , Rfl:   •  ibuprofen (MOTRIN) 800 mg tablet, Take by mouth every 6 (six) hours as needed for mild pain, Disp: , Rfl:   •  Omega-3 Fatty Acids (FISH OIL) 1,000 mg, Take 1,000 mg by mouth daily, Disp: , Rfl:   •  tretinoin (Retin-A) 0 05 % cream, Apply topically daily at bedtime, Disp: 45 g, Rfl: 0  •  zolpidem (AMBIEN) 5 mg tablet, Take 0 5 tablets (2 5 mg total) by mouth daily at bedtime as needed for sleep Use sparingly  Can be habit forming , Disp: 15 tablet, Rfl: 0          • Whom besides the patient is providing clinical information about today's encounter?   o NO ADDITIONAL HISTORIAN (patient alone provided history)    Physical Exam and Assessment/Plan by Diagnosis:    1  MELANOCYTIC NEVI ("Moles")    Physical Exam:  • Anatomic Location Affected:   Mostly on sun-exposed areas of the trunk and extremities  • Morphological Description:  Scattered, 1-4mm round to ovoid, symmetrical-appearing, even bordered, skin colored to dark brown macules/papules, mostly in sun-exposed areas  • Pertinent Positives:  • Pertinent Negatives: Additional History of Present Condition:      Assessment and Plan:  Based on a thorough discussion of this condition and the management approach to it (including a comprehensive discussion of the known risks, side effects and potential benefits of treatment), the patient (family) agrees to implement the following specific plan:  • When outside we recommend using a wide brim hat, sunglasses, long sleeve and pants, sunscreen with SPF 29+ with reapplication every 2 hours, or SPF specific clothing   • Benign, reassured  • Annual skin check  • recommended some moles are slightly atypical      Melanocytic Nevi  Melanocytic nevi ("moles") are tan or brown, raised or flat areas of the skin which have an increased number of melanocytes  Melanocytes are the cells in our body which make pigment and account for skin color      Some moles are present at birth (I e , "congenital nevi"), while others come up later in life (i e , "acquired nevi")  The sun can stimulate the body to make more moles  Sunburns are not the only thing that triggers more moles  Chronic sun exposure can do it too  Clinically distinguishing a healthy mole from melanoma may be difficult, even for experienced dermatologists  The "ABCDE's" of moles have been suggested as a means of helping to alert a person to a suspicious mole and the possible increased risk of melanoma  The suggestions for raising alert are as follows:    Asymmetry: Healthy moles tend to be symmetric, while melanomas are often asymmetric  Asymmetry means if you draw a line through the mole, the two halves do not match in color, size, shape, or surface texture  Asymmetry can be a result of rapid enlargement of a mole, the development of a raised area on a previously flat lesion, scaling, ulceration, bleeding or scabbing within the mole  Any mole that starts to demonstrate "asymmetry" should be examined promptly by a board certified dermatologist      Border: Healthy moles tend to have discrete, even borders  The border of a melanoma often blends into the normal skin and does not sharply delineate the mole from normal skin  Any mole that starts to demonstrate "uneven borders" should be examined promptly by a board certified dermatologist      Color: Healthy moles tend to be one color throughout  Melanomas tend to be made up of different colors ranging from dark black, blue, white, or red  Any mole that demonstrates a color change should be examined promptly by a board certified dermatologist      Diameter: Healthy moles tend to be smaller than 0 6 cm in size; an exception are "congenital nevi" that can be larger  Melanomas tend to grow and can often be greater than 0 6 cm (1/4 of an inch, or the size of a pencil eraser)  This is only a guideline, and many normal moles may be larger than 0 6 cm without being unhealthy    Any mole that starts to change in size (small to bigger or bigger to smaller) should be examined promptly by a board certified dermatologist      Evolving: Healthy moles tend to "stay the same "  Melanomas may often show signs of change or evolution such as a change in size, shape, color, or elevation  Any mole that starts to itch, bleed, crust, burn, hurt, or ulcerate or demonstrate a change or evolution should be examined promptly by a board certified dermatologist       Wen Zambrano    Physical Exam:  • Anatomic Location Affected:  Right upper arm   • Morphological Description:  4 mm pink smooth dermal papule   • Pertinent Positives:  • Pertinent Negatives: Additional History of Present Condition: picks when gets scaly and sometimes it bleeds      Assessment and Plan:  Based on a thorough discussion of this condition and the management approach to it (including a comprehensive discussion of the known risks, side effects and potential benefits of treatment), the patient (family) agrees to implement the following specific plan:  • Reassured benign   • Monitor for changes     Assessment and Plan:  A dermatofibroma is a common benign fibrous nodule that most often arises on the skin of the lower legs  A dermatofibroma is also called a "cutaneous fibrous histiocytoma "  Dermatofibromas occur at all ages and in people of every ethnicity  They are more common in women than in men  It is not clear if dermatofibroma is a reactive process or if it is a neoplasm  The lesions are made up of proliferating fibroblasts  Histiocytes may also be involved  They are sometimes attributed to an insect bite or ingrownhair or local trauma, but not consistently  They may be more numerous in patients with altered immunity  Dermatofibromas most often occur on the legs and arms, but may also arise on the trunk or any site of the body  Typical clinical features include the following:  • People may have 1 or up to 15 lesions    • Size varies from 0 5-1 5 cm diameter; most lesions are 7-10 mm diameter  • They are firm nodules tethered to the skin surface and mobile over subcutaneous tissue  • The skin "dimples" on pinching the lesion  • Color may be pink to light brown in white skin, and dark brown to black in dark skin; some appear paler in the center  • They do not usually cause symptoms, but they are sometimes painful or itchy  • Because they are often raised lesions, they may be traumatized, for example by a razor  • Occasionally dozens may erupt within a few months, usually in the setting of immunosuppression (for example autoimmune disease, cancer or certain medications)  • Dermatofibroma does not give rise to cancer  However, occasionally, it may be mistaken for dermatofibrosarcoma or desmoplastic melanoma  A dermatofibroma is harmless and seldom causes any symptoms  Usually, only reassurance is needed  If it is nuisance or causing concern, the lesion can be removed surgically, resulting in a scar that is, by definition, usually longer in diameter than the widest portion of the dermatofibroma  Cryotherapy, shave biopsy and laser surgery are rarely completely successful  Skin punch biopsy or incisional biopsy may be undertaken if there is an atypical feature such as recent enlargement, ulceration, or asymmetrical structures and colours on dermatoscopy            Scribe Attestation    I,:  Yvette Vale am acting as a scribe while in the presence of the attending physician :       I,:  Hafsa Ross MD personally performed the services described in this documentation    as scribed in my presence :

## 2023-01-10 ENCOUNTER — TELEPHONE (OUTPATIENT)
Dept: FAMILY MEDICINE CLINIC | Facility: CLINIC | Age: 43
End: 2023-01-10

## 2023-01-10 NOTE — TELEPHONE ENCOUNTER
Dr Gladys Lobo,    Patient called and stated that she has been doing the heat compresses and the ibuprofen   and tylenol with no avail  Patient states that her pain scale is about an 8 at the end of the day  She would like to know if you recommend anything else for her to do or take    Please advise

## 2023-01-11 ENCOUNTER — TELEPHONE (OUTPATIENT)
Dept: FAMILY MEDICINE CLINIC | Facility: CLINIC | Age: 43
End: 2023-01-11

## 2023-01-11 NOTE — PROGRESS NOTES
Patient left office after NST without being seen stating I have to  my child from  by 5:00 p m  In [address] @ 16:51  NST is reactive and reassuring    Follow-up appointment scheduled 2/28/2020 with Dr Eliseo Kulkarni                                                          3/6/2020 with Dr Samira Khan & 2/27/2020 with [facility]
room air
 used

## 2023-01-13 NOTE — PROGRESS NOTES
12/22/2022 12/22/2022   1  Zolpidem Tartrate 5 Mg Tablet 15 00  30  [facility]  [id number]   [facility] ([id number])   0 13 LME  Comm Ins  [place]     12/22/2022 12/22/2022   1  Zolpidem Tartrate 5 Mg Tablet 15 00  30  [facility]  [id number]   [facility] ([id number])  0  0 13 LME  [place]  [place]      1  Essential hypertension  Good blood pressure control    2  Myofascial pain syndrome, cervical  Treatment as noted    3  Panic attacks  Clinically reasonably well controlled    4  Vitamin D deficiency  Continue vitamin D       Follow up in 1 - 2 weeks    HPI: Here for follow up of  myofascial dysfunction/pain at left interscapular and parathoracic muscles  These tend to go into spasm when is stressed or busy at work such as lifting  She has no red flag symptoms: No chest pain short of breath deep bone pain unexplained weight loss fever chills or sweats  Chart reviewed for relevant medical, surgical and psychosocial history, medications and allergies  Review of systems  NO unexplained fever, chills, sweats or unexplained weight loss  NO new or unexplained chest pain or shortness of breath  NO unexplained changes in digestion or bowel movements or urination  NO red flag symptoms such as new or worsening neurological symptoms, deep bone pain in middle of night, major immunosuppressive disorder    Exam:   Alert, NAD /80 (BP Location: Left arm, Patient Position: Sitting, Cuff Size: Adult)   Pulse 70   Temp (!) 95 9 °F (35 5 °C) (Tympanic)   Ht 5' 3" (1 6 m)   Wt 56 9 kg (125 lb 8 oz)   LMP 01/08/2023 (Exact Date)   SpO2 99%   Breastfeeding Unknown   BMI 22 23 kg/m²     Skin: no pallor  Respiratory: no respiratory labor  Cardiac: Regular rate  Extremities: No cyanosis, clubbing or edema  Has trigger points, tender muscle regions in areas noted  Risks and benefits of therapeutic plan discussed, answered all patient questions and concerns and patient expressed understanding and agreement of therapeutic plan    Patient gave verbal consent for acupuncture/dry needling of noted trigger points with electrostimulation/Percutaneous neuro- stimulation (PENS) as needed  Surface release technique left interscapular muscles  Central PENS protocols:   Upper thoracic   T1 X T3 (-) --> T6 X T8 (+) 10 Hz;   Peripheral PENS    Massage  Infra-red heat lamp as comfortable    Patient tolerated procedure well  Sterile single use disposable acupuncture needles used and all needles were accounted for and disposed of in the sharps container after the procedure  Patient instructed in post-procedure care and expressed understanding        I spent 30 minutes face to face with patient

## 2023-01-18 ENCOUNTER — APPOINTMENT (OUTPATIENT)
Dept: LAB | Facility: CLINIC | Age: 43
End: 2023-01-18

## 2023-01-18 ENCOUNTER — PROCEDURE VISIT (OUTPATIENT)
Dept: FAMILY MEDICINE CLINIC | Facility: CLINIC | Age: 43
End: 2023-01-18

## 2023-01-18 VITALS
HEART RATE: 70 BPM | BODY MASS INDEX: 22.24 KG/M2 | WEIGHT: 125.5 LBS | DIASTOLIC BLOOD PRESSURE: 80 MMHG | OXYGEN SATURATION: 99 % | TEMPERATURE: 95.9 F | SYSTOLIC BLOOD PRESSURE: 110 MMHG | HEIGHT: 63 IN

## 2023-01-18 DIAGNOSIS — I10 ESSENTIAL HYPERTENSION: ICD-10-CM

## 2023-01-18 DIAGNOSIS — F41.0 PANIC ATTACKS: ICD-10-CM

## 2023-01-18 DIAGNOSIS — E61.1 IRON DEFICIENCY: ICD-10-CM

## 2023-01-18 DIAGNOSIS — E55.9 VITAMIN D DEFICIENCY: ICD-10-CM

## 2023-01-18 DIAGNOSIS — M79.18 MYOFASCIAL PAIN SYNDROME, CERVICAL: Primary | ICD-10-CM

## 2023-01-18 DIAGNOSIS — D50.8 IRON DEFICIENCY ANEMIA SECONDARY TO INADEQUATE DIETARY IRON INTAKE: ICD-10-CM

## 2023-01-18 LAB
BASOPHILS # BLD AUTO: 0.06 THOUSANDS/ÂΜL (ref 0–0.1)
BASOPHILS NFR BLD AUTO: 1 % (ref 0–1)
EOSINOPHIL # BLD AUTO: 0.22 THOUSAND/ÂΜL (ref 0–0.61)
EOSINOPHIL NFR BLD AUTO: 5 % (ref 0–6)
ERYTHROCYTE [DISTWIDTH] IN BLOOD BY AUTOMATED COUNT: 14.5 % (ref 11.6–15.1)
FERRITIN SERPL-MCNC: 8 NG/ML (ref 8–388)
HCT VFR BLD AUTO: 36 % (ref 34.8–46.1)
HGB BLD-MCNC: 11.2 G/DL (ref 11.5–15.4)
IMM GRANULOCYTES # BLD AUTO: 0.01 THOUSAND/UL (ref 0–0.2)
IMM GRANULOCYTES NFR BLD AUTO: 0 % (ref 0–2)
LYMPHOCYTES # BLD AUTO: 1.85 THOUSANDS/ÂΜL (ref 0.6–4.47)
LYMPHOCYTES NFR BLD AUTO: 38 % (ref 14–44)
MCH RBC QN AUTO: 27.9 PG (ref 26.8–34.3)
MCHC RBC AUTO-ENTMCNC: 31.1 G/DL (ref 31.4–37.4)
MCV RBC AUTO: 90 FL (ref 82–98)
MONOCYTES # BLD AUTO: 0.45 THOUSAND/ÂΜL (ref 0.17–1.22)
MONOCYTES NFR BLD AUTO: 9 % (ref 4–12)
NEUTROPHILS # BLD AUTO: 2.31 THOUSANDS/ÂΜL (ref 1.85–7.62)
NEUTS SEG NFR BLD AUTO: 47 % (ref 43–75)
NRBC BLD AUTO-RTO: 0 /100 WBCS
PLATELET # BLD AUTO: 345 THOUSANDS/UL (ref 149–390)
PMV BLD AUTO: 9.9 FL (ref 8.9–12.7)
RBC # BLD AUTO: 4.02 MILLION/UL (ref 3.81–5.12)
WBC # BLD AUTO: 4.9 THOUSAND/UL (ref 4.31–10.16)

## 2023-01-18 RX ORDER — CYCLOBENZAPRINE HCL 10 MG
10 TABLET ORAL 3 TIMES DAILY PRN
Qty: 20 TABLET | Refills: 1 | Status: SHIPPED | OUTPATIENT
Start: 2023-01-18

## 2023-01-24 DIAGNOSIS — L70.0 ACNE VULGARIS: ICD-10-CM

## 2023-01-24 DIAGNOSIS — F51.01 PRIMARY INSOMNIA: ICD-10-CM

## 2023-01-24 RX ORDER — TRETINOIN 0.5 MG/G
CREAM TOPICAL
Qty: 45 G | Refills: 0 | Status: SHIPPED | OUTPATIENT
Start: 2023-01-24

## 2023-01-24 RX ORDER — ZOLPIDEM TARTRATE 5 MG/1
2.5 TABLET ORAL
Qty: 15 TABLET | Refills: 0 | Status: SHIPPED | OUTPATIENT
Start: 2023-01-24

## 2023-01-24 NOTE — TELEPHONE ENCOUNTER
Patient is calling because she is having a real hard time with left side back going around to rib cage  Was given a muscle relaxer which is not helping  Patient would like to know if there is something else that can be prescribed for this  She has an appointment with Dr Lupillo Savage for 2/23

## 2023-02-17 NOTE — PROGRESS NOTES
1  Myofascial pain syndrome, cervical  Tx as noted    2  Essential hypertension  BP excellent control    3  Panic attacks  Improved somewhat  Follow up in 1 - 2 weeks    HPI: Here for follow up of  myofascial dysfunction/pain at neck and back  Panic attacks somewhat improved  Only triggered by stressful situations  Chart reviewed for relevant medical, surgical and psychosocial history, medications and allergies  Review of systems  NO unexplained fever, chills, sweats or unexplained weight loss  NO new or unexplained chest pain or shortness of breath  NO unexplained changes in digestion or bowel movements or urination  NO red flag symptoms such as new or worsening neurological symptoms, deep bone pain in middle of night, major immunosuppressive disorder    Exam:   Alert, NAD /82 (BP Location: Left arm, Patient Position: Sitting, Cuff Size: Adult)   Pulse 94   Temp (!) 96 °F (35 6 °C) (Tympanic)   Ht 5' 3" (1 6 m)   Wt 58 4 kg (128 lb 11 2 oz)   SpO2 94%   BMI 22 80 kg/m²   Skin: no pallor  Respiratory: no respiratory labor  Cardiac: Regular rate  Some tenderness L lower costochondral junctions, c/w costochondritis  Abd benign  No organomegaly, tenderness  No real discomfort with stomach crunch  Extremities: No cyanosis, clubbing or edema  Has trigger points, tender muscle regions in areas noted  Risks and benefits of therapeutic plan discussed, answered all patient questions and concerns and patient expressed understanding and agreement of therapeutic plan  Patient gave verbal consent for acupuncture/dry needling of noted trigger points with electrostimulation/Percutaneous neuro- stimulation (PENS) as needed  Two needle technique 10 Hz, 15 - 20 min  Eric  [id number]      N --> N + 1: 2 - 4 Hz 10 - 12 min  [facility] [facility]/Sergei Rollins acupressure at [employer], HT3 & 7, SI3, BL60    Massage  Infra-red heat lamp as comfortable    Patient tolerated procedure well   Sterile single use disposable acupuncture needles used and all needles were accounted for and disposed of in the sharps container after the procedure  Patient instructed in post-procedure care and expressed understanding        I spent 30 minutes face to face with patient

## 2023-02-23 ENCOUNTER — PROCEDURE VISIT (OUTPATIENT)
Dept: FAMILY MEDICINE CLINIC | Facility: CLINIC | Age: 43
End: 2023-02-23

## 2023-02-23 VITALS
HEIGHT: 63 IN | HEART RATE: 94 BPM | OXYGEN SATURATION: 94 % | TEMPERATURE: 96 F | WEIGHT: 128.7 LBS | BODY MASS INDEX: 22.8 KG/M2 | SYSTOLIC BLOOD PRESSURE: 122 MMHG | DIASTOLIC BLOOD PRESSURE: 82 MMHG

## 2023-02-23 DIAGNOSIS — M79.18 MYOFASCIAL PAIN SYNDROME, CERVICAL: Primary | ICD-10-CM

## 2023-02-23 DIAGNOSIS — F41.0 PANIC ATTACKS: ICD-10-CM

## 2023-02-23 DIAGNOSIS — M94.0 COSTOCHONDRITIS: ICD-10-CM

## 2023-02-23 DIAGNOSIS — I10 ESSENTIAL HYPERTENSION: ICD-10-CM

## 2023-02-23 RX ORDER — CYCLOBENZAPRINE HCL 10 MG
10 TABLET ORAL 3 TIMES DAILY PRN
Qty: 15 TABLET | Refills: 1 | Status: SHIPPED | OUTPATIENT
Start: 2023-02-23

## 2023-02-23 NOTE — PATIENT INSTRUCTIONS
To treat iron deficiency anemia, please do the followin  Eat foods that are healthy and naturally rich in iron, such as lean meats, poultry, fish/shellfish, green leafy vegetables, beets  2  Cooking out of iron pots may also boost your iron levels  2  In addition to the above, I want you to take an iron supplement as follows: Ferrous sulfate (such as Feosol 325 mg) nightly on an empty stomach with 500 mg of chewable vitamin C for 3 months  (keep iron supplements and all medications away from children, also, do not take iron with medications, since it may interfere with their absorption)  3   Alternately, can use a natural herbal source of iron such as Floradix (follow directions on bottle)

## 2023-03-01 DIAGNOSIS — L70.0 ACNE VULGARIS: ICD-10-CM

## 2023-03-01 DIAGNOSIS — F51.01 PRIMARY INSOMNIA: ICD-10-CM

## 2023-03-01 RX ORDER — TRETINOIN 0.5 MG/G
CREAM TOPICAL
Qty: 45 G | Refills: 0 | Status: SHIPPED | OUTPATIENT
Start: 2023-03-01

## 2023-03-01 RX ORDER — ZOLPIDEM TARTRATE 5 MG/1
2.5 TABLET ORAL
Qty: 15 TABLET | Refills: 0 | Status: SHIPPED | OUTPATIENT
Start: 2023-03-01

## 2023-03-23 ENCOUNTER — PROCEDURE VISIT (OUTPATIENT)
Dept: FAMILY MEDICINE CLINIC | Facility: CLINIC | Age: 43
End: 2023-03-23

## 2023-03-23 VITALS
OXYGEN SATURATION: 98 % | BODY MASS INDEX: 22.32 KG/M2 | HEART RATE: 84 BPM | DIASTOLIC BLOOD PRESSURE: 68 MMHG | WEIGHT: 126 LBS | RESPIRATION RATE: 18 BRPM | SYSTOLIC BLOOD PRESSURE: 102 MMHG

## 2023-03-23 DIAGNOSIS — I10 ESSENTIAL HYPERTENSION: Primary | ICD-10-CM

## 2023-03-23 DIAGNOSIS — M79.18 MYOFASCIAL PAIN SYNDROME, CERVICAL: ICD-10-CM

## 2023-03-23 DIAGNOSIS — G43.709 CHRONIC MIGRAINE WITHOUT AURA WITHOUT STATUS MIGRAINOSUS, NOT INTRACTABLE: ICD-10-CM

## 2023-03-23 NOTE — PROGRESS NOTES
1  Essential hypertension  Good bp control    2  Chronic migraine without aura without status migrainosus, not intractable  Stable now    3  Myofascial pain syndrome, mainly upper back, lower neck  Tx as noted      Follow up in 1 - 2 weeks    HPI: Here for follow up of  myofascial dysfunction/pain at upper back, trapezial ridges  Also anxiety    Chart reviewed for relevant medical, surgical and psychosocial history, medications and allergies  Review of systems  NO unexplained fever, chills, sweats or unexplained weight loss  NO new or unexplained chest pain or shortness of breath  NO unexplained changes in digestion or bowel movements or urination  NO red flag symptoms such as new or worsening neurological symptoms, deep bone pain in middle of night, major immunosuppressive disorder    Exam:   Alert, NAD /68   Pulse 84   Resp 18   Wt 57 2 kg (126 lb)   SpO2 98%   Breastfeeding No   BMI 22 32 kg/m²   Pulse  HEENT bioenergetic findings: Face:   Tongue:   Skin: no pallor  Respiratory: no respiratory labor  Cardiac: Regular rate  Extremities: No cyanosis, clubbing or edema  Has trigger points, tender muscle regions in areas noted  Risks and benefits of therapeutic plan discussed, answered all patient questions and concerns and patient expressed understanding and agreement of therapeutic plan  Patient gave verbal consent for acupuncture/dry needling of noted trigger points with electrostimulation/Percutaneous neuro- stimulation (PENS) as needed  Surface release technique  Inner scapular lines, cesar on L and levator scapulae origins   GV20   Two needle technique 10 Hz, 15 - 20 min  At [place] GB21    Massage  Infra-red heat lamp as comfortable    Patient tolerated procedure well  Sterile single use disposable acupuncture needles used and all needles were accounted for and disposed of in the sharps container after the procedure     Patient instructed in post-procedure care and expressed understanding          I spent 30 minutes face to face with patient

## 2023-03-23 NOTE — LETTER
March 23, 2023     Patient: Moon Mosquera  YOB: 1980  Date of Visit: 3/23/2023      To Whom it May Concern:    Michael Hobson is under my professional care  Carrington Machuca was seen in my office on 3/23/2023  Carrington Machuca may return to work on Monday 3/27  If you have any questions or concerns, please don't hesitate to call           Sincerely,          Marcelina Thakkar MD        CC: No Recipients

## 2023-03-29 DIAGNOSIS — M79.18 MYOFASCIAL PAIN SYNDROME, CERVICAL: ICD-10-CM

## 2023-03-29 RX ORDER — CYCLOBENZAPRINE HCL 10 MG
10 TABLET ORAL 3 TIMES DAILY PRN
Qty: 15 TABLET | Refills: 0 | Status: SHIPPED | OUTPATIENT
Start: 2023-03-29

## 2023-04-05 DIAGNOSIS — F51.01 PRIMARY INSOMNIA: ICD-10-CM

## 2023-04-05 RX ORDER — ZOLPIDEM TARTRATE 5 MG/1
2.5 TABLET ORAL
Qty: 15 TABLET | Refills: 0 | Status: SHIPPED | OUTPATIENT
Start: 2023-04-05

## 2023-04-24 ENCOUNTER — HOSPITAL ENCOUNTER (OUTPATIENT)
Dept: RADIOLOGY | Facility: HOSPITAL | Age: 43
Discharge: HOME | End: 2023-04-24

## 2023-04-24 DIAGNOSIS — M94.0 COSTOCHONDRITIS: ICD-10-CM

## 2023-05-08 DIAGNOSIS — L70.0 ACNE VULGARIS: ICD-10-CM

## 2023-05-08 DIAGNOSIS — M79.18 MYOFASCIAL PAIN SYNDROME, CERVICAL: ICD-10-CM

## 2023-05-08 DIAGNOSIS — F51.01 PRIMARY INSOMNIA: ICD-10-CM

## 2023-05-08 RX ORDER — CYCLOBENZAPRINE HCL 10 MG
10 TABLET ORAL 3 TIMES DAILY PRN
Qty: 15 TABLET | Refills: 0 | Status: SHIPPED | OUTPATIENT
Start: 2023-05-08

## 2023-05-08 RX ORDER — TRETINOIN 0.5 MG/G
CREAM TOPICAL
Qty: 45 G | Refills: 0 | Status: SHIPPED | OUTPATIENT
Start: 2023-05-08

## 2023-05-08 RX ORDER — ZOLPIDEM TARTRATE 5 MG/1
2.5 TABLET ORAL
Qty: 15 TABLET | Refills: 0 | Status: SHIPPED | OUTPATIENT
Start: 2023-05-08

## 2023-05-11 ENCOUNTER — TELEPHONE (OUTPATIENT)
Dept: FAMILY MEDICINE CLINIC | Facility: CLINIC | Age: 43
End: 2023-05-11

## 2023-05-24 ENCOUNTER — PROCEDURE VISIT (OUTPATIENT)
Dept: FAMILY MEDICINE CLINIC | Facility: CLINIC | Age: 43
End: 2023-05-24

## 2023-05-24 VITALS
WEIGHT: 128.56 LBS | HEART RATE: 82 BPM | BODY MASS INDEX: 22.78 KG/M2 | SYSTOLIC BLOOD PRESSURE: 132 MMHG | HEIGHT: 63 IN | TEMPERATURE: 98.5 F | RESPIRATION RATE: 21 BRPM | OXYGEN SATURATION: 99 % | DIASTOLIC BLOOD PRESSURE: 90 MMHG

## 2023-05-24 DIAGNOSIS — J01.00 SUBACUTE MAXILLARY SINUSITIS: Primary | ICD-10-CM

## 2023-05-24 DIAGNOSIS — I10 ESSENTIAL HYPERTENSION: ICD-10-CM

## 2023-05-24 RX ORDER — FEXOFENADINE HCL 180 MG/1
180 TABLET ORAL DAILY
Qty: 30 TABLET | Refills: 1 | Status: SHIPPED | OUTPATIENT
Start: 2023-05-24

## 2023-05-24 RX ORDER — AMOXICILLIN AND CLAVULANATE POTASSIUM 875; 125 MG/1; MG/1
1 TABLET, FILM COATED ORAL EVERY 12 HOURS SCHEDULED
Qty: 20 TABLET | Refills: 0 | Status: SHIPPED | OUTPATIENT
Start: 2023-05-24 | End: 2023-06-03

## 2023-05-24 RX ORDER — FLUTICASONE PROPIONATE 50 MCG
2 SPRAY, SUSPENSION (ML) NASAL DAILY
Qty: 16 G | Refills: 1 | Status: SHIPPED | OUTPATIENT
Start: 2023-05-24

## 2023-05-24 NOTE — PROGRESS NOTES
1  Subacute maxillary sinusitis  Reviewed good self-care  If not getting better in a few days on antiallergy medicines can add Augmentin  - fluticasone (FLONASE) 50 mcg/act nasal spray; 2 sprays into each nostril daily  Dispense: 16 g; Refill: 1  - fexofenadine (ALLEGRA) 180 MG tablet; Take 1 tablet (180 mg total) by mouth daily  Dispense: 30 tablet; Refill: 1  - amoxicillin-clavulanate (Augmentin) 875-125 mg per tablet; Take 1 tablet by mouth every 12 (twelve) hours for 10 days  Dispense: 20 tablet; Refill: 0    2  Essential hypertension  Fair blood pressure control  Healthy lifestyle reinforced  Check blood pressure at home and report if greater than 140/90  Hibiscus tea 1 cup twice a day      Return in about 2 weeks (around 2023) for Myofascial/Energy Tx 1/2 hr     Chief concern and HPI: Jadiel Siegel is a 41 y o  female  Chief Complaint   Patient presents with   • Headache     Patient has a headache, sinus pressure and a lot of congestion - going on about 16 days  Previous relevant clinical information reviewed from medical, surgical and psychosocial history, medication and allergies and labs/studies      Patient Active Problem List   Diagnosis   • Essential hypertension   • Tachycardia   • Scoliosis   • Panic attacks   • Acquired ankle/foot deformity   • Mass of breast   • Cervical herniated disc   • Endometriosis   • Generalized anxiety disorder   • Hemangioma of liver   • Vitamin D deficiency   • Family history of sickle cell trait   • History of  delivery   • 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   • History of placenta abruption   • Premature atrial complexes   • Family history of congenital heart defect   • S/P repeat low transverse    • Myofascial pain syndrome, cervical   • Chronic migraine without aura without status migrainosus, not intractable           Review of systems: No new or worsening:   Unexplained fever/chills/sweats/weight loss  Respiratory issues such as "new shortness of breath, cough  Heart issues such as new chest pain or pressure, palpitations  Abdominal or digestive issues such as diarrhea, constipation or blood in stool  BM's are normal  Genitourinary issues  Neurological issues such as new numbness or motor weakness  Psychological issues such as depression or anxiety  Skin issues such as new rashes        Exam:  Alert, no acute distress /90 (BP Location: Left arm, Patient Position: Sitting, Cuff Size: Standard)   Pulse 82   Temp 98 5 °F (36 9 °C) (Temporal)   Resp 21   Ht 5' 3\" (1 6 m)   Wt 58 3 kg (128 lb 9 oz)   LMP 05/19/2023 (Exact Date)   SpO2 99%   BMI 22 77 kg/m²   HEENT: Head normocephalic and atraumatic  TMs clear  Mild sinus tenderness over maxilla  Throat negative  Lungs: No respiratory labor  Clear to auscultation  Cardiac: Regular rate and rhythm  No murmur, rub or gallop  Abdomen: Benign  Normal active bowel sounds  Soft and non-tender  No organomegaly  Extremities: No cyanosis, clubbing or edema  Neuro screen: No gross deficits           Risks and benefits of therapeutic plan discussed, answered all patient questions and concerns and patient expressed understanding and agreement of therapeutic plan          "

## 2023-05-24 NOTE — PATIENT INSTRUCTIONS
DASH diet for HTN  Background: High blood pressure from Hypertension (HTN) is very common  It has been called the ‘silent killer’ because it usually has no symptoms (you don’t feel different), yet is a major cause of many diseases, such as heart attacks, strokes, kidney disease, etc    Because HTN usually has no symptoms, you may find  it challenging to stick with your treatment plan  So how are you going to know how you are doing if you can’t ‘feel’ HTN? The answer is to buy a BP cuff (semi-automatic type that goes around the arm is best) and become part of your health care team by regularly (e g  a few times a week) monitoring and keeping a log of your BP  Bring this to your doctor every time you visit them  Though there may be a family (genetic) tendency, HTN is largely caused by unhealthy lifestyle choices  The good news is that it can be treated naturally in most cases with sensible and doable healthy lifestyle choices  Sometimes doctors need to prescribe medications also  It is very important you take any medicines exactly as prescribed  Do not stop them unless directed by your doctor, even if you feel fine without them - remember, you can’t ‘feel’ HTN! Be sure to let your doctor know if you have any problems with your medications  Healthy lifestyle choices such as eating, moving, sleeping and handling stress right will greatly reduce or eliminate many cases of HTN  So let’s get started! This handout focuses on healthy eating  Be sure to pace yourself, making no more than one small change a week  Good idea to write down what you eat & drink for a few days and bring it to your next doctor’s visit so they help you stay on track          This low salt DASH diet plan has been proven to  lower BP, as well as be healthy in general: Free government site: NameProposal com br       Links to commercial DASH diet resources:     Reputable commercial site that offers support while following DASH diet plan, including meal plans, diet tracking tools, motivational tips and option to ask questions of an expert panel via email  Nominal fee charged for these services is well worth it: http://www [employer]/   Hypertension (HTN) is a major risk factor for other cardiovascular disease such as stroke, heart attacks, as well as kidney failure and many other issues  Because it has few and often no symptoms, it can easily be ignored, but doing so can over time destroy the body's organs and one's health  Hypertension is NOT just high blood pressure (BP), although it is very important to control high BP per se  It includes many other aspects, including endothelial (the inner lining of blood vessels) dysfunction , altered vascular compliance (the stretchiness of blood vessels) and others  To treat HTN, start with the 'pillars of health' that support good health for all of us:   Eat right: A DASH diet has been shown very effective in treating HTN in robust scientific trials:   DASH diet for HTN  Background: High blood pressure from Hypertension (HTN) is very common  It has been called the ‘silent killer’ because it usually has no symptoms (you don’t feel different), yet is a major cause of many diseases, such as heart attacks, strokes, kidney disease, etc    Because HTN usually has no symptoms, you may find  it challenging to stick with your treatment plan  So how are you going to know how you are doing if you can’t ‘feel’ HTN? The answer is to buy a BP cuff (semi-automatic type that goes around the arm is best) and become part of your health care team by regularly (e g  a few times a week) monitoring and keeping a log of your BP  Bring this to your doctor every time you visit them  Though there may be a family (genetic) tendency, HTN is largely caused by unhealthy lifestyle choices   The good news is that it can be treated naturally in most cases with sensible and doable healthy lifestyle choices  Sometimes doctors need to prescribe medications also  It is very important you take any medicines exactly as prescribed  Do not stop them unless directed by your doctor, even if you feel fine without them - remember, you can’t ‘feel’ HTN! Be sure to let your doctor know if you have any problems with your medications  Healthy lifestyle choices such as eating, moving, sleeping and handling stress right will greatly reduce or eliminate most cases of HTN  So let’s get started! This handout focuses on healthy eating  Be sure to pace yourself, making no more than one small change a week  Good idea to write down what you eat & drink for a few days and bring it to your next doctor’s visit so they help you stay on track  This low salt DASH diet plan has been proven to markedly lower BP, as well as be healthy in general: NameProposal com br      Links to DASH diet resources:  Free government site with DASH diet plan and resources:  NameProposal com br   Reputable commercial site that offers support while following DASH diet plan, including meal plans, diet tracking tools, motivational tips and option to ask questions of an expert panel via email  Nominal fee charged for these services is well worth it: http://www [employer]/       2  Regular exercise, at least 150 minutes of moderate exercise (I e  30 minutes 5 days a week), such as brisk walking, jogging, various sports and martial arts, dancing, etc  Generally with HTN avoid exercise with heavy isometrics (exercise against an immovable object), valsalva maneuvers (bearing down with increased abdominal pressure) since these can acutely increase BP  Be sure to discuss with your doctor first to make sure this is safe for you       3  Adequate sleep and rest  If anyone has witnessed you having disordered breathing and/or you have unexplained daytime sleepiness, be sure to let your doctor know so they can rule out sleep apnea, which makes HTN (as well as many other chronic diseases) worse  Various biofeedback devices (e g  see CIBDO) can support relaxation and lowering of BP  Talk to your doctor if you are interested in these  4  Natural medicines can supplement the above Pillars of Health  Take these only with the specific direction of your doctor:       Natural diuretics (caution: diuretics may lead to K+ (potassium)  wasting):    Hibiscus:  (Hibiscus sabdariffa) calyces & sepals are high in vitamin C, pectin and anthocyanins  Effective anti-HTN  Mild diuretic  Can increase HDL  (SOREN B)  Dose: Pour 1 cup boiling water over 2 teaspoons chopped hibiscus calyces, drink 1 - 2 cups/day OR tincture 5 ml 2X/d or 1 G caps 2 - 3 X/d OR Standardized extract 2 -3 X/d      Chocolate (cesar  dark > 70% cocoa) has flavanols that improve blood pressure, insulin resistance, and vascular and platelet function  (Kamryn, 2009; Luis Felipe, 2010)  Lowers LDL in short term studies       Botanicals to AVOID if HTN: Licorice (DGL form usually ok), ephedra, caffeine, Panax ginseng      SOREN = Level of evidence: C = preliminary evidence, B = moderate strong evidence, A = strong evidence

## 2023-06-01 DIAGNOSIS — M79.18 MYOFASCIAL PAIN SYNDROME, CERVICAL: ICD-10-CM

## 2023-06-02 RX ORDER — CYCLOBENZAPRINE HCL 10 MG
10 TABLET ORAL 3 TIMES DAILY PRN
Qty: 15 TABLET | Refills: 0 | Status: SHIPPED | OUTPATIENT
Start: 2023-06-02

## 2023-06-12 DIAGNOSIS — F51.01 PRIMARY INSOMNIA: ICD-10-CM

## 2023-06-12 RX ORDER — ZOLPIDEM TARTRATE 5 MG/1
2.5 TABLET ORAL
Qty: 15 TABLET | Refills: 0 | Status: SHIPPED | OUTPATIENT
Start: 2023-06-12

## 2023-06-16 ENCOUNTER — PROCEDURE VISIT (OUTPATIENT)
Dept: FAMILY MEDICINE CLINIC | Facility: CLINIC | Age: 43
End: 2023-06-16
Payer: COMMERCIAL

## 2023-06-16 VITALS
HEART RATE: 83 BPM | RESPIRATION RATE: 16 BRPM | HEIGHT: 63 IN | SYSTOLIC BLOOD PRESSURE: 123 MMHG | WEIGHT: 132 LBS | OXYGEN SATURATION: 99 % | BODY MASS INDEX: 23.39 KG/M2 | DIASTOLIC BLOOD PRESSURE: 84 MMHG

## 2023-06-16 DIAGNOSIS — I10 ESSENTIAL HYPERTENSION: Primary | ICD-10-CM

## 2023-06-16 DIAGNOSIS — F41.1 GENERALIZED ANXIETY DISORDER: ICD-10-CM

## 2023-06-16 DIAGNOSIS — M79.18 MYOFASCIAL PAIN SYNDROME, CERVICAL: ICD-10-CM

## 2023-06-16 PROCEDURE — 97811 ACUP 1/> W/O ESTIM EA ADD 15: CPT | Performed by: FAMILY MEDICINE

## 2023-06-16 PROCEDURE — 99214 OFFICE O/P EST MOD 30 MIN: CPT | Performed by: FAMILY MEDICINE

## 2023-06-16 NOTE — PROGRESS NOTES
"1  Essential hypertension  Adequate blood pressure control    2  Myofascial pain syndrome, cervical  Treatment as noted    3  Generalized anxiety disorder  Discussed commitment to 5 minutes of daily meditation  Discussed daily self-care      Follow up in 1 - 2 weeks    HPI: Here for follow up of  myofascial dysfunction/pain at neck and back  Has crept back  Chart reviewed for relevant medical, surgical and psychosocial history, medications and allergies  Review of systems  NO unexplained fever, chills, sweats or unexplained weight loss  NO new or unexplained chest pain or shortness of breath  NO unexplained changes in digestion or bowel movements or urination  NO red flag symptoms such as new or worsening neurological symptoms, deep bone pain in middle of night, major immunosuppressive disorder    Exam:   Alert, NAD /84 (BP Location: Left arm, Patient Position: Sitting, Cuff Size: Standard)   Pulse 83   Resp 16   Ht 5' 3\" (1 6 m)   Wt 59 9 kg (132 lb)   LMP 05/19/2023 (Exact Date)   SpO2 99%   BMI 23 38 kg/m²     Respiratory: no respiratory labor  Cardiac: Regular rate  Extremities: No cyanosis, clubbing or edema  Has trigger points, tender muscle regions in areas noted  Risks and benefits of therapeutic plan discussed, answered all patient questions and concerns and patient expressed understanding and agreement of therapeutic plan  Patient gave verbal consent for acupuncture/dry needling of noted trigger points with electrostimulation/Percutaneous neuro- stimulation (PENS) as needed  Surface release technique inner scapular lines  GB0, BL10 tyler  Red laser KI3, KI7, HT3, SI3, BL60 & Khan men       Massage  Infra-red heat lamp as comfortable    Patient tolerated procedure well  Sterile single use disposable acupuncture needles used and all needles were accounted for and disposed of in the sharps container after the procedure     Patient instructed in post-procedure care and expressed " understanding        I spent 30 minutes face to face with patient

## 2023-06-27 ENCOUNTER — HOSPITAL ENCOUNTER (OUTPATIENT)
Dept: ULTRASOUND IMAGING | Facility: CLINIC | Age: 43
Discharge: HOME | End: 2023-06-27
Payer: COMMERCIAL

## 2023-06-27 ENCOUNTER — HOSPITAL ENCOUNTER (OUTPATIENT)
Dept: MAMMOGRAPHY | Facility: CLINIC | Age: 43
Discharge: HOME | End: 2023-06-27
Payer: COMMERCIAL

## 2023-06-27 VITALS — HEIGHT: 63 IN | WEIGHT: 132 LBS | BODY MASS INDEX: 23.39 KG/M2

## 2023-06-27 DIAGNOSIS — N63.20 MASS OF LEFT BREAST, UNSPECIFIED QUADRANT: ICD-10-CM

## 2023-06-27 PROCEDURE — G0279 TOMOSYNTHESIS, MAMMO: HCPCS

## 2023-06-27 PROCEDURE — 77066 DX MAMMO INCL CAD BI: CPT

## 2023-06-27 PROCEDURE — 76642 ULTRASOUND BREAST LIMITED: CPT

## 2023-07-12 DIAGNOSIS — M79.18 MYOFASCIAL PAIN SYNDROME, CERVICAL: ICD-10-CM

## 2023-07-12 RX ORDER — CYCLOBENZAPRINE HCL 10 MG
10 TABLET ORAL 3 TIMES DAILY PRN
Qty: 15 TABLET | Refills: 0 | Status: SHIPPED | OUTPATIENT
Start: 2023-07-12

## 2023-07-14 DIAGNOSIS — J01.00 SUBACUTE MAXILLARY SINUSITIS: ICD-10-CM

## 2023-07-14 RX ORDER — FLUTICASONE PROPIONATE 50 MCG
SPRAY, SUSPENSION (ML) NASAL
Qty: 16 G | Refills: 1 | Status: SHIPPED | OUTPATIENT
Start: 2023-07-14

## 2023-07-17 DIAGNOSIS — L70.0 ACNE VULGARIS: ICD-10-CM

## 2023-07-17 DIAGNOSIS — F51.01 PRIMARY INSOMNIA: ICD-10-CM

## 2023-07-18 RX ORDER — TRETINOIN 0.5 MG/G
CREAM TOPICAL
Qty: 45 G | Refills: 3 | Status: SHIPPED | OUTPATIENT
Start: 2023-07-18

## 2023-07-18 RX ORDER — ZOLPIDEM TARTRATE 5 MG/1
2.5 TABLET ORAL
Qty: 15 TABLET | Refills: 0 | Status: SHIPPED | OUTPATIENT
Start: 2023-07-18 | End: 2023-08-21 | Stop reason: SDUPTHER

## 2023-07-20 ENCOUNTER — PROCEDURE VISIT (OUTPATIENT)
Dept: FAMILY MEDICINE CLINIC | Facility: CLINIC | Age: 43
End: 2023-07-20
Payer: COMMERCIAL

## 2023-07-20 VITALS
HEIGHT: 63 IN | RESPIRATION RATE: 16 BRPM | TEMPERATURE: 97.5 F | SYSTOLIC BLOOD PRESSURE: 104 MMHG | WEIGHT: 131 LBS | OXYGEN SATURATION: 98 % | DIASTOLIC BLOOD PRESSURE: 84 MMHG | BODY MASS INDEX: 23.21 KG/M2 | HEART RATE: 97 BPM

## 2023-07-20 DIAGNOSIS — F41.0 PANIC ATTACKS: ICD-10-CM

## 2023-07-20 DIAGNOSIS — M79.18 MYOFASCIAL PAIN SYNDROME, CERVICAL: Primary | ICD-10-CM

## 2023-07-20 DIAGNOSIS — I10 ESSENTIAL HYPERTENSION: ICD-10-CM

## 2023-07-20 DIAGNOSIS — F41.1 GENERALIZED ANXIETY DISORDER: ICD-10-CM

## 2023-07-20 PROCEDURE — 99214 OFFICE O/P EST MOD 30 MIN: CPT | Performed by: FAMILY MEDICINE

## 2023-07-20 NOTE — PROGRESS NOTES
No problem-specific Assessment & Plan notes found for this encounter. Follow up in 1 - 2 weeks    HPI: Here for follow up of  myofascial dysfunction/pain at upper back, bilateral, more on L. Chart reviewed for relevant medical, surgical and psychosocial history, medications and allergies. Filled  Written  Sold  ID  Drug  QTY  Days  Prescriber  RX #  Dispenser  Refill  Daily Dose*  Pymt Type      07/18/2023 07/18/2023   1  Zolpidem Tartrate 5 Mg Tablet 15.00  30  Al Rem  [id number]   Wal (2920)  0  0.13 LME  Comm [place]  [place]          Review of systems  NO unexplained fever, chills, sweats or unexplained weight loss  NO new or unexplained chest pain or shortness of breath  NO unexplained changes in digestion or bowel movements or urination  NO red flag symptoms such as new or worsening neurological symptoms, deep bone pain in middle of night, major immunosuppressive disorder    Exam: /84 (BP Location: Left arm, Patient Position: Sitting, Cuff Size: Standard)   Pulse 97   Temp 97.5 °F (36.4 °C) (Temporal)   Resp 16   Ht 5' 3" (1.6 m)   Wt 59.4 kg (131 lb)   LMP 06/14/2023 (Approximate)   SpO2 98%   BMI 23.21 kg/m²     Alert, NAD LMP 06/14/2023 (Approximate)   Skin: no pallor  Respiratory: no respiratory labor  Cardiac: Regular rate  Extremities: No cyanosis, clubbing or edema  Has trigger points, tender muscle regions in areas noted. Risks and benefits of therapeutic plan discussed, answered all patient questions and concerns and patient expressed understanding and agreement of therapeutic plan. Patient gave verbal consent for acupuncture/dry needling of noted trigger points with electrostimulation/Percutaneous neuro- stimulation (PENS) as needed. N --> N + 1: 15 min.: Red laser KI3, KI7, HT7, SI3, BL60, L aurculotherapy: Romana Aus men, Tranquilizer    Gua Sha L upper back, Massage  Infra-red heat lamp as comfortable    Patient tolerated procedure well.  Sterile single use disposable acupuncture needles used and all needles were accounted for and disposed of in the sharps container after the procedure. Patient instructed in post-procedure care and expressed understanding.     I spent 30 minutes face to face with patient

## 2023-07-20 NOTE — PATIENT INSTRUCTIONS
Good Somatics resources:      1. Somatics materials: [email or web address]:  AUDIO: Somatic Exercises[TM] narrated by Annemarie Aleman:    Somatic Exercises[TM]for the Lower Back (CDs)    Somatic Exercises[TM]for Delicate Backs (CDs)     AUDIO: Somatic Exercises[TM] narrated by Ricarda Nissen, Ed.D.:     Somatic Exercises[TM]for the Complete Back (CDs)       2. Another useful site. Http://[employer]/products/#cds is the website that sells Somatics CDs by Jese Shah, a reputable Somatics practitioner. Examples of useful CDs:    Functional Differentiation of the  Lumbar, Thoracic, & Cervical Spine - $15.00    Movements for Sensing & Freeing the Sacrum & Cranium - $15.00    Also carries CDs by Annemarie Aleman, the developer of Somatics, as listed in the site above. Somatics Educational Resources  [address] [facility], [address], [address] [place] [address]    3. [facility] (has MP3 downloads), not too pricey: such as Eliminate Back Pain'  https://[email or web address]-[email or web address]-exercises-[email or web address]-home/        Tips on practicing Somatic education:  1. What counts is the level of careful body awareness one brings to the exercises, not the vigorousness or amplitude of the movement. 2. Just like learning anything, Somatic education takes time. 3. The more consistent your practice, the better the results. 4. These exercises in general are very safe, however, if something hurts, back off or don't do it. Use your common sense.      Enjoy your exploration of Somatics! - Dr. Wood Duckworth

## 2023-08-21 DIAGNOSIS — F51.01 PRIMARY INSOMNIA: ICD-10-CM

## 2023-08-21 DIAGNOSIS — J01.00 SUBACUTE MAXILLARY SINUSITIS: ICD-10-CM

## 2023-08-21 RX ORDER — ZOLPIDEM TARTRATE 5 MG/1
2.5 TABLET ORAL
Qty: 15 TABLET | Refills: 0 | Status: SHIPPED | OUTPATIENT
Start: 2023-08-21

## 2023-08-21 RX ORDER — FLUTICASONE PROPIONATE 50 MCG
2 SPRAY, SUSPENSION (ML) NASAL DAILY
Qty: 16 G | Refills: 5 | Status: SHIPPED | OUTPATIENT
Start: 2023-08-21

## 2023-08-24 DIAGNOSIS — M79.18 MYOFASCIAL PAIN SYNDROME, CERVICAL: ICD-10-CM

## 2023-08-25 RX ORDER — CYCLOBENZAPRINE HCL 10 MG
10 TABLET ORAL 3 TIMES DAILY PRN
Qty: 15 TABLET | Refills: 0 | Status: SHIPPED | OUTPATIENT
Start: 2023-08-25

## 2023-09-28 DIAGNOSIS — F51.01 PRIMARY INSOMNIA: ICD-10-CM

## 2023-09-30 RX ORDER — ZOLPIDEM TARTRATE 5 MG/1
2.5 TABLET ORAL
Qty: 15 TABLET | Refills: 0 | Status: SHIPPED | OUTPATIENT
Start: 2023-09-30

## 2023-10-17 DIAGNOSIS — M79.18 MYOFASCIAL PAIN SYNDROME, CERVICAL: ICD-10-CM

## 2023-10-17 RX ORDER — CYCLOBENZAPRINE HCL 10 MG
10 TABLET ORAL 3 TIMES DAILY PRN
Qty: 15 TABLET | Refills: 0 | Status: SHIPPED | OUTPATIENT
Start: 2023-10-17

## 2023-11-01 ENCOUNTER — OFFICE VISIT (OUTPATIENT)
Dept: FAMILY MEDICINE CLINIC | Facility: CLINIC | Age: 43
End: 2023-11-01
Payer: COMMERCIAL

## 2023-11-01 VITALS
WEIGHT: 135 LBS | BODY MASS INDEX: 23.92 KG/M2 | HEART RATE: 97 BPM | HEIGHT: 63 IN | DIASTOLIC BLOOD PRESSURE: 68 MMHG | SYSTOLIC BLOOD PRESSURE: 104 MMHG | OXYGEN SATURATION: 99 %

## 2023-11-01 DIAGNOSIS — I10 ESSENTIAL HYPERTENSION: ICD-10-CM

## 2023-11-01 DIAGNOSIS — Z00.00 ANNUAL PHYSICAL EXAM: Primary | ICD-10-CM

## 2023-11-01 DIAGNOSIS — G43.709 CHRONIC MIGRAINE WITHOUT AURA WITHOUT STATUS MIGRAINOSUS, NOT INTRACTABLE: ICD-10-CM

## 2023-11-01 DIAGNOSIS — L70.0 ACNE VULGARIS: ICD-10-CM

## 2023-11-01 DIAGNOSIS — F51.01 PRIMARY INSOMNIA: ICD-10-CM

## 2023-11-01 PROCEDURE — 99396 PREV VISIT EST AGE 40-64: CPT | Performed by: FAMILY MEDICINE

## 2023-11-01 RX ORDER — TRETINOIN 0.5 MG/G
CREAM TOPICAL
Qty: 45 G | Refills: 3 | Status: SHIPPED | OUTPATIENT
Start: 2023-11-01

## 2023-11-01 RX ORDER — NARATRIPTAN 1 MG/1
1 TABLET ORAL ONCE AS NEEDED
Qty: 10 TABLET | Refills: 5 | Status: SHIPPED | OUTPATIENT
Start: 2023-11-01

## 2023-11-01 RX ORDER — ZOLPIDEM TARTRATE 5 MG/1
2.5 TABLET ORAL
Qty: 15 TABLET | Refills: 0 | Status: SHIPPED | OUTPATIENT
Start: 2023-11-01

## 2023-11-01 NOTE — LETTER
November 1, 2023     Patient: Ronnie Escobedo  YOB: 1980  Date of Visit: 11/1/2023      To Whom it May Concern:    Torito Chaudhari is under my professional care. Bailey Nate was seen in my office on 11/1/2023 and completed her annual physical.    If you have any questions or concerns, please don't hesitate to call. Sincerely,          Arielle Barker.  MD Lm        CC: No Recipients Received call from Amara at [facility] with Red Flag Complaint. Subjective: Caller states \"having chest pressure with activity for 2 days. When he is walking or running he will feel more short of breath. \"     LIMITED TRIAGE DUE TO CHILD NOT WITH CALLER    Current Symptoms: Chest pain    Onset: 2 days ago; unchanged    Associated Symptoms: NA    Pain Severity: 0/10; Unable to assess due to patient not with caller; Unknown    Temperature: Denies Fever    What has been tried: Nothing    LMP: NA Pregnant: NA     Recommended disposition: Go to [facility]. Advised to call back with new or worsening symptoms. Care advice provided, patient verbalizes understanding; denies any other questions or concerns; instructed to call back for any new or worsening symptoms. Patient/Caller agrees with recommended disposition; writer provided warm transfer to Carol Irvin at [facility] for appointment scheduling    Attention Provider: Thank you for allowing me to participate in the care of your patient. The patient was connected to triage in response to information provided to the ECC. Please do not respond through this encounter as the response is not directed to a shared pool.       Reason for Disposition   Difficulty breathing    Protocols used: CHEST PAIN-PEDIATRIC-[place]

## 2023-11-01 NOTE — PATIENT INSTRUCTIONS
Headache    Lifestyle modification:    - Headache diary to identify triggers    - Increase aerobic exercise    - Stress reduction;  See Jeane henderson    Nutrition:  -  Encourage whole fruits/veggie and anti-inflammatory diet  -  Decrease or stop coffee and caffeine intake   Consider elimination diet to elicit any food triggers;  May refer to nutrition for this                   Common triggers include sulfites, eggs, MSG, cheese, chocolate    Supplements:     Consider Migralief which contains Mg, Feverfew and Riboflavin ($20 / month)

## 2023-12-01 DIAGNOSIS — J01.00 SUBACUTE MAXILLARY SINUSITIS: ICD-10-CM

## 2023-12-01 DIAGNOSIS — M79.18 MYOFASCIAL PAIN SYNDROME, CERVICAL: ICD-10-CM

## 2023-12-01 DIAGNOSIS — F51.01 PRIMARY INSOMNIA: ICD-10-CM

## 2023-12-01 RX ORDER — CYCLOBENZAPRINE HCL 10 MG
10 TABLET ORAL 3 TIMES DAILY PRN
Qty: 15 TABLET | Refills: 0 | Status: SHIPPED | OUTPATIENT
Start: 2023-12-01

## 2023-12-01 RX ORDER — FLUTICASONE PROPIONATE 50 MCG
2 SPRAY, SUSPENSION (ML) NASAL DAILY
Qty: 16 G | Refills: 0 | Status: SHIPPED | OUTPATIENT
Start: 2023-12-01

## 2023-12-01 RX ORDER — ZOLPIDEM TARTRATE 5 MG/1
2.5 TABLET ORAL
Qty: 15 TABLET | Refills: 0 | Status: SHIPPED | OUTPATIENT
Start: 2023-12-01

## 2024-01-05 DIAGNOSIS — L70.0 ACNE VULGARIS: ICD-10-CM

## 2024-01-05 DIAGNOSIS — F51.01 PRIMARY INSOMNIA: ICD-10-CM

## 2024-01-09 RX ORDER — TRETINOIN 0.5 MG/G
CREAM TOPICAL
Qty: 45 G | Refills: 0 | Status: SHIPPED | OUTPATIENT
Start: 2024-01-09

## 2024-01-09 RX ORDER — ZOLPIDEM TARTRATE 5 MG/1
2.5 TABLET ORAL
Qty: 15 TABLET | Refills: 0 | Status: SHIPPED | OUTPATIENT
Start: 2024-01-09

## 2024-01-19 ENCOUNTER — APPOINTMENT (OUTPATIENT)
Dept: LAB | Facility: CLINIC | Age: 44
End: 2024-01-19
Payer: COMMERCIAL

## 2024-01-19 DIAGNOSIS — G43.709 CHRONIC MIGRAINE WITHOUT AURA WITHOUT STATUS MIGRAINOSUS, NOT INTRACTABLE: ICD-10-CM

## 2024-01-19 DIAGNOSIS — Z00.00 ANNUAL PHYSICAL EXAM: ICD-10-CM

## 2024-01-19 DIAGNOSIS — I10 ESSENTIAL HYPERTENSION: ICD-10-CM

## 2024-01-19 LAB
ANION GAP SERPL CALCULATED.3IONS-SCNC: 5 MMOL/L
BASOPHILS # BLD AUTO: 0.04 THOUSANDS/ÂΜL (ref 0–0.1)
BASOPHILS NFR BLD AUTO: 1 % (ref 0–1)
BUN SERPL-MCNC: 12 MG/DL (ref 5–25)
CALCIUM SERPL-MCNC: 8.7 MG/DL (ref 8.4–10.2)
CHLORIDE SERPL-SCNC: 107 MMOL/L (ref 96–108)
CHOLEST SERPL-MCNC: 149 MG/DL
CO2 SERPL-SCNC: 25 MMOL/L (ref 21–32)
CREAT SERPL-MCNC: 0.54 MG/DL (ref 0.6–1.3)
EOSINOPHIL # BLD AUTO: 0.26 THOUSAND/ÂΜL (ref 0–0.61)
EOSINOPHIL NFR BLD AUTO: 6 % (ref 0–6)
ERYTHROCYTE [DISTWIDTH] IN BLOOD BY AUTOMATED COUNT: 15.5 % (ref 11.6–15.1)
FERRITIN SERPL-MCNC: 4 NG/ML (ref 11–307)
GFR SERPL CREATININE-BSD FRML MDRD: 117 ML/MIN/1.73SQ M
GLUCOSE P FAST SERPL-MCNC: 94 MG/DL (ref 65–99)
HCT VFR BLD AUTO: 33.9 % (ref 34.8–46.1)
HDLC SERPL-MCNC: 46 MG/DL
HGB BLD-MCNC: 10.7 G/DL (ref 11.5–15.4)
IMM GRANULOCYTES # BLD AUTO: 0 THOUSAND/UL (ref 0–0.2)
IMM GRANULOCYTES NFR BLD AUTO: 0 % (ref 0–2)
LDLC SERPL CALC-MCNC: 90 MG/DL (ref 0–100)
LYMPHOCYTES # BLD AUTO: 1.63 THOUSANDS/ÂΜL (ref 0.6–4.47)
LYMPHOCYTES NFR BLD AUTO: 40 % (ref 14–44)
MCH RBC QN AUTO: 27.6 PG (ref 26.8–34.3)
MCHC RBC AUTO-ENTMCNC: 31.6 G/DL (ref 31.4–37.4)
MCV RBC AUTO: 87 FL (ref 82–98)
MONOCYTES # BLD AUTO: 0.38 THOUSAND/ÂΜL (ref 0.17–1.22)
MONOCYTES NFR BLD AUTO: 9 % (ref 4–12)
NEUTROPHILS # BLD AUTO: 1.75 THOUSANDS/ÂΜL (ref 1.85–7.62)
NEUTS SEG NFR BLD AUTO: 44 % (ref 43–75)
NONHDLC SERPL-MCNC: 103 MG/DL
NRBC BLD AUTO-RTO: 0 /100 WBCS
PLATELET # BLD AUTO: 303 THOUSANDS/UL (ref 149–390)
PMV BLD AUTO: 10.1 FL (ref 8.9–12.7)
POTASSIUM SERPL-SCNC: 4.4 MMOL/L (ref 3.5–5.3)
RBC # BLD AUTO: 3.88 MILLION/UL (ref 3.81–5.12)
SODIUM SERPL-SCNC: 137 MMOL/L (ref 135–147)
TRIGL SERPL-MCNC: 65 MG/DL
WBC # BLD AUTO: 4.06 THOUSAND/UL (ref 4.31–10.16)

## 2024-01-19 PROCEDURE — 85025 COMPLETE CBC W/AUTO DIFF WBC: CPT

## 2024-01-19 PROCEDURE — 80048 BASIC METABOLIC PNL TOTAL CA: CPT

## 2024-01-19 PROCEDURE — 36415 COLL VENOUS BLD VENIPUNCTURE: CPT

## 2024-01-19 PROCEDURE — 80061 LIPID PANEL: CPT

## 2024-01-19 PROCEDURE — 82728 ASSAY OF FERRITIN: CPT

## 2024-02-09 DIAGNOSIS — F51.01 PRIMARY INSOMNIA: ICD-10-CM

## 2024-02-09 DIAGNOSIS — M79.18 MYOFASCIAL PAIN SYNDROME, CERVICAL: ICD-10-CM

## 2024-02-09 RX ORDER — ZOLPIDEM TARTRATE 5 MG/1
2.5 TABLET ORAL
Qty: 15 TABLET | Refills: 0 | Status: SHIPPED | OUTPATIENT
Start: 2024-02-09

## 2024-02-09 RX ORDER — CYCLOBENZAPRINE HCL 10 MG
10 TABLET ORAL 3 TIMES DAILY PRN
Qty: 15 TABLET | Refills: 0 | Status: SHIPPED | OUTPATIENT
Start: 2024-02-09

## 2024-02-10 ENCOUNTER — APPOINTMENT (OUTPATIENT)
Dept: RADIOLOGY | Age: 44
End: 2024-02-10
Payer: COMMERCIAL

## 2024-02-10 ENCOUNTER — OFFICE VISIT (OUTPATIENT)
Dept: OBGYN CLINIC | Facility: CLINIC | Age: 44
End: 2024-02-10
Payer: COMMERCIAL

## 2024-02-10 VITALS
HEIGHT: 63 IN | DIASTOLIC BLOOD PRESSURE: 98 MMHG | WEIGHT: 135 LBS | SYSTOLIC BLOOD PRESSURE: 142 MMHG | BODY MASS INDEX: 23.92 KG/M2 | HEART RATE: 94 BPM

## 2024-02-10 DIAGNOSIS — M41.23 OTHER IDIOPATHIC SCOLIOSIS, CERVICOTHORACIC REGION: ICD-10-CM

## 2024-02-10 DIAGNOSIS — M62.838 MUSCLE SPASMS OF NECK: ICD-10-CM

## 2024-02-10 DIAGNOSIS — M48.02 CERVICAL STENOSIS OF SPINE: ICD-10-CM

## 2024-02-10 DIAGNOSIS — M79.602 LEFT ARM PAIN: ICD-10-CM

## 2024-02-10 DIAGNOSIS — M25.512 ACUTE PAIN OF LEFT SHOULDER: ICD-10-CM

## 2024-02-10 DIAGNOSIS — M54.2 NECK PAIN ON LEFT SIDE: Primary | ICD-10-CM

## 2024-02-10 PROCEDURE — 99203 OFFICE O/P NEW LOW 30 MIN: CPT | Performed by: PHYSICIAN ASSISTANT

## 2024-02-10 PROCEDURE — 73030 X-RAY EXAM OF SHOULDER: CPT

## 2024-02-10 RX ORDER — METHYLPREDNISOLONE 4 MG/1
TABLET ORAL
Qty: 21 TABLET | Refills: 0 | Status: SHIPPED | OUTPATIENT
Start: 2024-02-10

## 2024-02-10 NOTE — LETTER
February 10, 2024     Patient: Yakelin Bay  YOB: 1980  Date of Visit: 2/10/2024      To Whom it May Concern:    Yakelin Bay is under my professional care. Yakelin was seen in my office on 2/10/2024. Yakelin is excused from work today/tonight due to orthopedic condition.    If you have any questions or concerns, please don't hesitate to call.         Sincerely,          Kang Gomez PA-C        CC: No Recipients

## 2024-02-10 NOTE — PATIENT INSTRUCTIONS
Cervical Disc Herniation   WHAT YOU NEED TO KNOW:   Cervical disc herniation (CDH) occurs when a disc bulges out from between the vertebrae (bones) in your neck. Discs are spongy cushions between the vertebrae. The bulging disc may press on your nerves or spinal cord.        DISCHARGE INSTRUCTIONS:   Call your local emergency number (911 in the [place]), or have someone call if:   You have sudden trouble breathing.      Seek care immediately if:   You lose feeling in one or both arms.    You are suddenly not able to move your neck, or one or both arms.    You are not able to move one or both legs.    Call your doctor if:   You cannot control when you urinate or have a bowel movement.    Your pain gets worse, even after you take medicine.    Your voice suddenly becomes hoarse.    You have trouble swallowing.    You have questions or concerns about your condition or care.    Medicines:  You may need any of the following:  NSAIDs , such as ibuprofen, help decrease swelling, pain, and fever. This medicine is available with or without a doctor's order. NSAIDs can cause stomach bleeding or kidney problems in certain people. If you take blood thinner medicine, always ask your healthcare provider if NSAIDs are safe for you. Always read the medicine label and follow directions.    Prescription pain medicine  may be given. Ask your healthcare provider how to take this medicine safely. Some prescription pain medicines contain acetaminophen. Do not take other medicines that contain acetaminophen without talking to your healthcare provider. Too much acetaminophen may cause liver damage. Prescription pain medicine may cause constipation. Ask your healthcare provider how to prevent or treat constipation.     Muscle relaxers  decrease pain from muscle spasms.    Take your medicine as directed.  Contact your healthcare provider if you think your medicine is not helping or if you have side effects. Tell your provider if you are allergic  to any medicine. Keep a list of the medicines, vitamins, and herbs you take. Include the amounts, and when and why you take them. Bring the list or the pill bottles to follow-up visits. Carry your medicine list with you in case of an emergency.    Activity:  Your healthcare provider may have you rest in bed to prevent more injury to your neck. Ask how long you should rest and when you can return to your daily activities.  Heat:  Apply heat on your neck for 20 to 30 minutes every 2 hours for as many days as directed. Heat helps decrease pain and muscle spasms.  Ice:  Apply ice on your neck for 15 to 20 minutes every hour or as directed. Use an ice pack, or put crushed ice in a plastic bag. Cover the bag with a towel before you apply it to your skin. Ice helps prevent tissue damage and decreases swelling and pain.  Physical therapy:  A physical therapist teaches you exercises to make your neck muscles stronger. A physical therapist also teaches you stretches to decrease your pain.  A cervical collar  prevents neck movement and decreases pain. Your provider will tell you how often to use the collar and for how long.       Follow up with your doctor as directed:  Write down your questions so you remember to ask them during your visits.  © Copyright [employer] 2023 Information is for End User's use only and may not be sold, redistributed or otherwise used for commercial purposes.  The above information is an  only. It is not intended as medical advice for individual conditions or treatments. Talk to your doctor, nurse or pharmacist before following any medical regimen to see if it is safe and effective for you.

## 2024-02-10 NOTE — PROGRESS NOTES
Orthopaedic Surgery - Office Note  Yakelin Bay (43 y.o. female)   : 1980   MRN: [id number]  Encounter Date: 2/10/2024    Chief Complaint   Patient presents with    Left Shoulder - Pain         Assessment/Plan  Diagnoses and all orders for this visit:    Neck pain on left side  -     XR shoulder 2+ vw left; Future  -     methylPREDNISolone 4 MG tablet therapy pack; Use as directed on package  -     Ambulatory referral to Spine & Pain Management; Future  -     Ambulatory Referral to Physical Therapy; Future    Cervical stenosis of spine  -     methylPREDNISolone 4 MG tablet therapy pack; Use as directed on package  -     Ambulatory referral to Spine & Pain Management; Future  -     Ambulatory Referral to Physical Therapy; Future    Muscle spasms of neck  -     methylPREDNISolone 4 MG tablet therapy pack; Use as directed on package  -     Ambulatory referral to Spine & Pain Management; Future  -     Ambulatory Referral to Physical Therapy; Future    Left arm pain  -     methylPREDNISolone 4 MG tablet therapy pack; Use as directed on package  -     Ambulatory referral to Spine & Pain Management; Future  -     Ambulatory Referral to Physical Therapy; Future    Other idiopathic scoliosis, cervicothoracic region  -     Ambulatory Referral to Orthopedic Surgery; Future    The diagnosis as well as treatment options were reviewed with the patient in the office today.  I reviewed her appointment today was for her left shoulder and I do not believe she has any shoulder symptoms that require further treatment such as therapy or cortisone injection.  I believe her symptoms are coming from her cervical neck and I would recommend initial conservative treatment of formal physical therapy.  The risks and benefits of a short period of oral steroid were reviewed.  Appropriate precautions were provided.  She will start this medication today and avoid any oral NSAIDs while on this medication.  Once she starts the medication  "she should not stop the medication.  She will be provided a note excusing her from work tonight.  She may continue the oral muscle relaxer at night if desired.  She may use Tylenol as needed for breakthrough discomfort.  She will be referred to spine and pain for follow-up of the cervical neck symptoms.    Patient scoliosis noted on plain films and with a significant history were reviewed.  She would like an opinion from a spine surgeon and I will refer her to Dr. Chew     Return for Recheck with spine and pain for neck and Dr. Chew for scoliosis.        History of Present Illness  This is a new patient with left-sided neck pain.  Patient reports a longstanding history of neck and thoracic back pain.  She reports she is treated with her PCP for this for an extended period of time.  She reports she has known scoliosis which she believes is getting worse.  She denies any trauma or injury to her neck or shoulder.  She reports that over the past 24 hours she has had acute worsening of left-sided neck pain with muscle spasms and symptoms radiating into the shoulder and down the arm.  She has not noticed any significant weakness in the left arm.  She has tried prescription muscle relaxers heat and topical anti-inflammatories without relief.  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  She denies any red flag symptoms.    Patient has a contributing medical history of myofascial pain syndrome and follows with her family physician for this.  She is used medications including muscle relaxers and received acupuncture care.  She has had a cervical neck MRI in the past showing stenosis.    Review of Systems  Pertinent items are noted in HPI.  All other systems were reviewed and are negative.    Physical Exam  /98 (BP Location: Right arm, Patient Position: Sitting, Cuff Size: Standard)   Pulse 94   Ht 5' 3\" (1.6 m)   Wt 61.2 kg (135 lb)   " BMI 23.91 kg/m²   Cons: Appears well but uncomfortable with limited neck range of motion and stiff posturing.  Psych: Alert. Oriented x3.  Mood and affect normal.  Patient's left shoulder forward flexion is to 165 degrees.  Internal rotation is to L1.  External rotation is to 80 degrees.  This does not reproduce any shoulder pain but reports cervical neck pain through the shoulder range of motion.  Rotator cuff strength is 5 out of 5 in all planes.  She has sensation intact to light touch in all dermatomes in the left upper extremity.  She has a negative impingement sign.  She has a negative drop arm test.  She has no shoulder instability on clinical examination.     Patient has limited cervical neck range of motion with reproducible pain through her limited range of motion.  She has noted left-sided paraspinal muscle spasm as well as tenderness to palpation.  She is nontender on the spinous processes.    X-rays performed in the office today 3 views of the left shoulder show no acute fractures or dislocations.  No calcific tendinitis is noted.  No significant degenerative changes are seen.  X-rays were reviewed from orthopedic standpoint will await official radiologist interpretation.  Significant scoliosis is noted.           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     COMPARISON:  None.     TECHNIQUE:  Sagittal T1, sagittal T2, sagittal inversion recovery, axial T2, axial  2D merge     IMAGE QUALITY:  Diagnostic     FINDINGS:     ALIGNMENT:  Reversal the cervical lordosis apex C6.     MARROW SIGNAL:  Normal marrow signal is identified within the visualized bony structures.  No discrete marrow lesion.     CERVICAL AND VISUALIZED THORACIC CORD:  Normal signal within the visualized cord.     PREVERTEBRAL AND PARASPINAL SOFT TISSUES:  Normal.     VISUALIZED POSTERIOR FOSSA:  The visualized posterior fossa  demonstrates no abnormal signal.     CERVICAL DISC SPACES:     C2-C3:  Moderate facet hypertrophy on the right.  No central or foraminal narrowing.     C3-C4:  Moderate facet hypertrophy on the right.  No central or foraminal narrowing.     C4-C5:  Normal.     C5-C6:  Normal.     C6-C7:  Normal.     C7-T1:  Normal.     UPPER THORACIC DISC SPACES:  Normal.     IMPRESSION:     Reversal the cervical lordosis apex C6.  No central or foraminal narrowing.  Right-sided facet degenerative change noted at C2-3 and C3-4.              Workstation performed: [id number]   Cervical neck MRI results were reviewed by myself in the office today.  Previous PCP notes were reviewed by myself in the office today.      Procedures  No procedures today.    Medical, Surgical, Family, and Social History  The patient's medical history, family history, and social history, were reviewed and updated as appropriate.    Past Medical History:   Diagnosis Date    Chronic fatigue     last assessed 18    Endometriosis     GERD (gastroesophageal reflux disease) 24 Intermittent, especially when was pregnant. Ok now    Hypoglycemia     Migraine     Ovarian cyst     Scoliosis     last assessed  10/21/13    Varicella     childhood    Vitamin D deficiency 24 Takes 1000 units of Vit. D       Past Surgical History:   Procedure Laterality Date     SECTION      x2    ECTOPIC PREGNANCY SURGERY      LAPAROSCOPY      exploratory    [place]  DELIVERY ONLY N/A 3/26/2020    Procedure:  SECTION () REPEAT;  Surgeon: Jinny Berry MD;  Location: [facility] ;  Service: Obstetrics    SALPINGECTOMY         Family History   Problem Relation Age of Onset    Hypertension Mother     Cancer Mother         skin    Hyperlipidemia Mother     Hyperlipidemia Father     Hypertension Sister     Heart disease Sister         MVP    Other Daughter         sickle cell trait    Diabetes Maternal Grandmother         type 2     Heart disease Maternal Grandmother     Cancer Maternal Grandfather         throat, oral, lung    Stroke Maternal Grandfather     Cancer Paternal Grandmother         brain    Heart disease Paternal Grandmother         MI    Other Paternal Grandfather     Lung cancer Paternal Grandfather         dementia    Cancer Maternal Uncle         lung, prostate    Other Maternal Uncle         agent orange exp., smoker    Alcohol abuse Maternal Uncle     Breast cancer Neg Hx        Social History     Occupational History    Occupation:      Comment: at night    Occupation: Nurse, 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   Tobacco Use    Smoking status: Former     Current packs/day: 0.25     Average packs/day: 0.3 packs/day for 2.0 years (0.5 ttl pk-yrs)     Types: Cigarettes    Smokeless tobacco: Never   Vaping Use    Vaping status: Never Used   Substance and Sexual Activity    Alcohol use: Not Currently     Comment: once a year    Drug use: No    Sexual activity: Yes     Partners: Male       Allergies   Allergen Reactions    Cephalexin      Chest pain    Levofloxacin Diarrhea    Sulfa Antibiotics Hives     With swelling         Current Outpatient Medications:     methylPREDNISolone 4 MG tablet therapy pack, Use as directed on package, Disp: 21 tablet, Rfl: 0    acetaminophen (TYLENOL) 325 mg tablet, Take 650 mg by mouth every 6 (six) hours as needed for mild pain, Disp: , Rfl:     carvedilol (COREG) 6.25 mg tablet, TAKE 1 TABLET(6.25 MG) BY MOUTH TWICE DAILY WITH MEALS, Disp: , Rfl:     cyclobenzaprine (FLEXERIL) 10 mg tablet, Take 1 tablet (10 mg total) by mouth 3 (three) times a day as needed for muscle spasms May cause drowsiness, Disp: 15 tablet, Rfl: 0    ferrous sulfate 325 (65 Fe) mg tablet, Take 325 mg by mouth 2 (two) times a day with meals For at least 6 months, Disp: , Rfl:     fexofenadine (ALLEGRA) 180 MG tablet, Take 1 tablet (180 mg total) by mouth daily, Disp: 30 tablet, Rfl: 1    fluticasone (FLONASE)  50 mcg/act nasal spray, 2 sprays into each nostril daily Shake liquid and spray, Disp: 16 g, Rfl: 0    ibuprofen (MOTRIN) 800 mg tablet, Take by mouth every 6 (six) hours as needed for mild pain, Disp: , Rfl:     Multiple Vitamin (multivitamin) tablet, Take 1 tablet by mouth daily, Disp: , Rfl:     naratriptan (AMERGE) 1 MG TABS, Take 1 tablet (1 mg total) by mouth once as needed for migraine (May repeat dose in 4 hrs if needed. Maximum 5 mg per week.) for up to 1 dose May repeat once after 4 hours, Disp: 10 tablet, Rfl: 5    Omega-3 Fatty Acids (FISH OIL) 1,000 mg, Take 1,000 mg by mouth daily, Disp: , Rfl:     tretinoin (Retin-A) 0.05 % cream, Apply topically daily at bedtime, Disp: 45 g, Rfl: 0    zolpidem (AMBIEN) 5 mg tablet, Take 0.5 tablets (2.5 mg total) by mouth daily at bedtime as needed for sleep Use sparingly. Can be habit forming., Disp: 15 tablet, Rfl: 0      Kang Gomez PA-C

## 2024-03-09 PROBLEM — R00.0 TACHYCARDIA: Status: RESOLVED | Noted: 2019-03-26 | Resolved: 2024-03-09

## 2024-03-11 DIAGNOSIS — F51.01 PRIMARY INSOMNIA: ICD-10-CM

## 2024-03-11 DIAGNOSIS — J01.00 SUBACUTE MAXILLARY SINUSITIS: ICD-10-CM

## 2024-03-12 RX ORDER — FLUTICASONE PROPIONATE 50 MCG
2 SPRAY, SUSPENSION (ML) NASAL DAILY
Qty: 16 G | Refills: 0 | Status: SHIPPED | OUTPATIENT
Start: 2024-03-12

## 2024-03-12 RX ORDER — ZOLPIDEM TARTRATE 5 MG/1
2.5 TABLET ORAL
Qty: 15 TABLET | Refills: 0 | Status: SHIPPED | OUTPATIENT
Start: 2024-03-12

## 2024-03-13 ENCOUNTER — TELEPHONE (OUTPATIENT)
Age: 44
End: 2024-03-13

## 2024-03-13 NOTE — TELEPHONE ENCOUNTER
VM on appt line:    Yes, my name is Maria Guadalupe Serrano. I have an 8:30 with doctor Monique I need to cancel. I'm not feeling well today. If you could please return my phone call at [phone]. Thanks. Bye.    Attempted to contact left a VM - canceling appt

## 2024-04-09 ENCOUNTER — OFFICE VISIT (OUTPATIENT)
Age: 44
End: 2024-04-09

## 2024-04-09 VITALS
BODY MASS INDEX: 21.93 KG/M2 | WEIGHT: 123.8 LBS | TEMPERATURE: 97.1 F | HEIGHT: 63 IN | HEART RATE: 68 BPM | SYSTOLIC BLOOD PRESSURE: 110 MMHG | RESPIRATION RATE: 18 BRPM | DIASTOLIC BLOOD PRESSURE: 76 MMHG | OXYGEN SATURATION: 98 %

## 2024-04-09 DIAGNOSIS — E61.1 IRON DEFICIENCY: ICD-10-CM

## 2024-04-09 DIAGNOSIS — N64.4 MASTALGIA: Primary | ICD-10-CM

## 2024-04-09 PROBLEM — Z98.891 HISTORY OF CESAREAN DELIVERY: Status: RESOLVED | Noted: 2019-10-09 | Resolved: 2024-04-09

## 2024-04-09 PROCEDURE — 99213 OFFICE O/P EST LOW 20 MIN: CPT | Performed by: FAMILY MEDICINE

## 2024-04-09 NOTE — PROGRESS NOTES
"[facility] Office visit    Assessment/Plan:     1. Mastalgia  Assessment & Plan:  Has 4/5 \"achy\" unilateral left sided breast pain for the past 3 weeks. Patient states that she normally has bilateral cyclical breast pain but the left breast pain lingered on which remains \"Achy\". Has had hx of left breast pain in the past then had normal mammogram and ultrasound in 6/2023. There was a previous hypoechoic mass in the 3' o clock position which was no longer seen in the most recent ultrasound.  Pain has not changed in quality or severity since then.     Last day of menstrual cycle 3/13/24.     On physical exam, fibrocystic changes noted.     Follow-up in 1 week to re-assess pain and breasts after menstrual cycle   Consider ordering imaging sooner or waiting until June for screening mammogram and ABUS depending on symptoms at that time      2. Iron deficiency  Comments:  Continue taking iron supplements daily and take twice daily every other day as tolerated.       Return in about 1 week (around 4/16/2024) for Follow-up breast pain.     Subjective:   Yakelin Bay is a 44 y.o. female presents today for intermittent 4/5 \"achy\" unilateral left sided breast pain for the past 3 weeks. Patient states that she normally has bilateral cyclical breast pain but the left breast pain lingered on which remains \"Achy\". Has had hx of left breast pain in the past then had normal mammogram and ultrasound in 6/2023. Pain has not changed in quality or severity since then. States periods are regular, not painful or heavy. She has 4 year old at home and when she jumps on it, she feels the pain.     Also unable to tolerate iron tablet BID as previously advised, has nausea and vomiting sometimes.     She is also concerned about low WBC on most recent CBC.     Denies nipple discharge, worsening or change in quality of left breast pain from the past, changes in skin of breast or any other concerns at this time.          " "  Review of Systems   Constitutional:  Negative for chills and fever.   HENT:  Negative for ear pain and sore throat.    Eyes:  Negative for pain and visual disturbance.   Respiratory:  Negative for cough and shortness of breath.    Cardiovascular:  Negative for chest pain and palpitations.   Gastrointestinal:  Negative for abdominal pain and vomiting.   Genitourinary:  Negative for dysuria and hematuria.        Left breast pain   Musculoskeletal:  Negative for arthralgias and back pain.   Skin:  Negative for color change and rash.   Neurological:  Negative for seizures and syncope.   All other systems reviewed and are negative.       Objective:     /76 (BP Location: Left arm, Patient Position: Sitting, Cuff Size: Adult)   Pulse 68   Temp (!) 97.1 °F (36.2 °C) (Tympanic)   Resp 18   Ht 5' 3\" (1.6 m)   Wt 56.2 kg (123 lb 12.8 oz)   SpO2 98%   BMI 21.93 kg/m²      Physical Exam  Constitutional:       Appearance: Normal appearance.   HENT:      Head: Atraumatic.   Eyes:      General:         Right eye: No discharge.         Left eye: No discharge.      Conjunctiva/sclera: Conjunctivae normal.   Cardiovascular:      Rate and Rhythm: Normal rate and regular rhythm.   Pulmonary:      Effort: Pulmonary effort is normal. No respiratory distress.      Breath sounds: Normal breath sounds.   Chest:   Breasts:     Right: Normal.      Left: Tenderness (3'o clock position, where a 1 cm mobile cyst palpated) present. No swelling, bleeding, inverted nipple, nipple discharge or skin change.   Abdominal:      General: Bowel sounds are normal.      Palpations: Abdomen is soft.      Tenderness: There is no abdominal tenderness.   Musculoskeletal:      Cervical back: Neck supple.   Skin:     General: Skin is warm and dry.      Capillary Refill: Capillary refill takes less than 2 seconds.   Neurological:      Mental Status: She is alert.          ** Please Note: This note has been constructed using a voice recognition system " **     Zachary Jeronimo MD  04/09/24  10:13 AM

## 2024-04-09 NOTE — ASSESSMENT & PLAN NOTE
"Has 4/5 \"achy\" unilateral left sided breast pain for the past 3 weeks. Patient states that she normally has bilateral cyclical breast pain but the left breast pain lingered on which remains \"Achy\". Has had hx of left breast pain in the past then had normal mammogram and ultrasound in 6/2023. There was a previous hypoechoic mass in the 3' o clock position which was no longer seen in the most recent ultrasound.  Pain has not changed in quality or severity since then.     Last day of menstrual cycle 3/13/24.     On physical exam, fibrocystic changes noted.     Follow-up in 1 week to re-assess pain and breasts after menstrual cycle   Consider ordering imaging sooner or waiting until June for screening mammogram and ABUS depending on symptoms at that time  "

## 2024-04-13 DIAGNOSIS — M79.18 MYOFASCIAL PAIN SYNDROME, CERVICAL: ICD-10-CM

## 2024-04-13 DIAGNOSIS — F51.01 PRIMARY INSOMNIA: ICD-10-CM

## 2024-04-13 DIAGNOSIS — J01.00 SUBACUTE MAXILLARY SINUSITIS: ICD-10-CM

## 2024-04-13 DIAGNOSIS — L70.0 ACNE VULGARIS: ICD-10-CM

## 2024-04-16 ENCOUNTER — TELEPHONE (OUTPATIENT)
Age: 44
End: 2024-04-16

## 2024-04-16 RX ORDER — ZOLPIDEM TARTRATE 5 MG/1
2.5 TABLET ORAL
Qty: 15 TABLET | Refills: 0 | Status: SHIPPED | OUTPATIENT
Start: 2024-04-16

## 2024-04-16 RX ORDER — TRETINOIN 0.5 MG/G
CREAM TOPICAL
Qty: 45 G | Refills: 3 | Status: SHIPPED | OUTPATIENT
Start: 2024-04-16

## 2024-04-16 RX ORDER — CYCLOBENZAPRINE HCL 10 MG
10 TABLET ORAL 3 TIMES DAILY PRN
Qty: 15 TABLET | Refills: 0 | Status: SHIPPED | OUTPATIENT
Start: 2024-04-16

## 2024-04-16 RX ORDER — FLUTICASONE PROPIONATE 50 MCG
2 SPRAY, SUSPENSION (ML) NASAL DAILY
Qty: 16 G | Refills: 3 | Status: SHIPPED | OUTPATIENT
Start: 2024-04-16

## 2024-04-16 NOTE — TELEPHONE ENCOUNTER
VM on appt line:    Hi, my name is Fariha Serrano. I had an 8:00 AM appointment with Doctor Lloyd on. I need to reschedule. I'm not gonna make it, so if you could please return my phone, call [phone]. Thank you. Bye  You received a voice mail from [employer].    Attempted to contact - left a VM.

## 2024-04-19 ENCOUNTER — TELEPHONE (OUTPATIENT)
Age: 44
End: 2024-04-19

## 2024-04-19 NOTE — TELEPHONE ENCOUNTER
Prescriber Response Form from [facility].    Scanned copy into encounter.    Placed in Dr. Amato's folder in precepting room.    Fax when complete: [phone]

## 2024-05-13 NOTE — TELEPHONE ENCOUNTER
Pharmacy called and asked if it is okay to dispense this medication with her being allergic to Sulfa  Please advise  Thanks!
Yes its fine it is not a new med
done

## 2024-05-16 ENCOUNTER — OFFICE VISIT (OUTPATIENT)
Dept: FAMILY MEDICINE CLINIC | Facility: CLINIC | Age: 44
End: 2024-05-16
Payer: COMMERCIAL

## 2024-05-16 VITALS
RESPIRATION RATE: 18 BRPM | WEIGHT: 122.8 LBS | HEART RATE: 82 BPM | DIASTOLIC BLOOD PRESSURE: 80 MMHG | TEMPERATURE: 97.4 F | SYSTOLIC BLOOD PRESSURE: 132 MMHG | BODY MASS INDEX: 21.76 KG/M2 | HEIGHT: 63 IN

## 2024-05-16 DIAGNOSIS — Z83.2 FAMILY HISTORY OF SICKLE CELL TRAIT: ICD-10-CM

## 2024-05-16 DIAGNOSIS — Z12.31 SCREENING MAMMOGRAM FOR HIGH-RISK PATIENT: ICD-10-CM

## 2024-05-16 DIAGNOSIS — D50.9 IRON DEFICIENCY ANEMIA, UNSPECIFIED IRON DEFICIENCY ANEMIA TYPE: ICD-10-CM

## 2024-05-16 DIAGNOSIS — N64.4 MASTALGIA IN FEMALE: ICD-10-CM

## 2024-05-16 DIAGNOSIS — I10 ESSENTIAL HYPERTENSION: Primary | ICD-10-CM

## 2024-05-16 DIAGNOSIS — R53.83 OTHER FATIGUE: ICD-10-CM

## 2024-05-16 DIAGNOSIS — M79.18 MYOFASCIAL PAIN SYNDROME, CERVICAL: ICD-10-CM

## 2024-05-16 PROCEDURE — 99214 OFFICE O/P EST MOD 30 MIN: CPT | Performed by: FAMILY MEDICINE

## 2024-05-16 NOTE — PROGRESS NOTES
Assessment/Plan:    1. Essential hypertension  Assessment & Plan:  Stable  Sees cardio  2. Mastalgia in female  -     Mammo screening bilateral w 3d & cad; Future; Expected date: 05/16/2024  3. Screening mammogram for high-risk patient  -     Mammo screening bilateral w 3d & cad; Future; Expected date: 05/16/2024  4. Iron deficiency anemia, unspecified iron deficiency anemia type  -     CBC and differential; Future  -     Iron Panel (Includes Ferritin, Iron Sat%, Iron, and TIBC); Future  -     Reticulocyte Count with Retic HGB; Future  -     RF Screen w/ Reflex to Titer; Future  -     MIGUEL ÁNGEL Screen w/ Reflex to Titer/Pattern; Future  5. Other fatigue  -     TSH, 3rd generation; Future  -     T4, free; Future  6. Myofascial pain syndrome, cervical  Assessment & Plan:  Takes muscl relaxor is good on meds at this time  7. Family history of sickle cell trait  -     Sickle cell screen; Future        There are no Patient Instructions on file for this visit.    No follow-ups on file.    Subjective:      Patient ID: Yakelin Bay is a 44 y.o. female.    Chief Complaint   Patient presents with   • Establish Care     Tonya Vaughan CMA        Pt is here to establish care  Transferring over from [place]  Pt states she has anemia - recently had labs iron Is low  She states she is fatigued      Pt also states she has left breast pain going on.  States this has gotten worse and was told at [place] she is due for a mammo  Pt states she has had l breast pain for the past few months  Pt has an OBGYN - has not mentioned her breast apin to them  Does do pap/pelvic there.     Looking at labs pt has iron def - pt was taking iron qd and then bid - which bothered her stomach.  Pt states she recently strated on a blood Builder.   Pt does eat meat  Pt has normal periods - vbleeds for less than 7 days not particularly heavy        The following portions of the patient's history were reviewed and updated as appropriate: allergies, current  "medications, past family history, past medical history, past social history, past surgical history and problem list.    Review of Systems   Constitutional:  Positive for fatigue.   Genitourinary:         Breast pain         Current Outpatient Medications   Medication Sig Dispense Refill   • acetaminophen (TYLENOL) 325 mg tablet Take 650 mg by mouth every 6 (six) hours as needed for mild pain     • carvedilol (COREG) 3.125 mg tablet      • cyclobenzaprine (FLEXERIL) 10 mg tablet Take 1 tablet (10 mg total) by mouth 3 (three) times a day as needed for muscle spasms May cause drowsiness 15 tablet 0   • ferrous sulfate 325 (65 Fe) mg tablet Take 325 mg by mouth 2 (two) times a day with meals For at least 6 months     • fexofenadine (ALLEGRA) 180 MG tablet Take 1 tablet (180 mg total) by mouth daily 30 tablet 1   • fluticasone (FLONASE) 50 mcg/act nasal spray 2 sprays into each nostril daily Shake liquid and spray 16 g 3   • ibuprofen (MOTRIN) 800 mg tablet Take by mouth every 6 (six) hours as needed for mild pain     • lisinopril (ZESTRIL) 5 mg tablet      • methylPREDNISolone 4 MG tablet therapy pack Use as directed on package 21 tablet 0   • Multiple Vitamin (multivitamin) tablet Take 1 tablet by mouth daily     • naratriptan (AMERGE) 1 MG TABS Take 1 tablet (1 mg total) by mouth once as needed for migraine (May repeat dose in 4 hrs if needed. Maximum 5 mg per week.) for up to 1 dose May repeat once after 4 hours 10 tablet 5   • Omega-3 Fatty Acids (FISH OIL) 1,000 mg Take 1,000 mg by mouth daily     • tretinoin (Retin-A) 0.05 % cream Apply topically daily at bedtime 45 g 3   • zolpidem (AMBIEN) 5 mg tablet Take 0.5 tablets (2.5 mg total) by mouth daily at bedtime as needed for sleep Use sparingly. Can be habit forming. 15 tablet 0     No current facility-administered medications for this visit.       Objective:    /80   Pulse 82   Temp (!) 97.4 °F (36.3 °C)   Resp 18   Ht 5' 3\" (1.6 m)   Wt 55.7 kg (122 lb " 12.8 oz)   LMP 05/08/2024 (Exact Date)   BMI 21.75 kg/m²        Physical Exam  Vitals and nursing note reviewed.   Constitutional:       General: She is not in acute distress.     Appearance: She is well-developed. She is not diaphoretic.   HENT:      Head: Normocephalic and atraumatic.      Right Ear: External ear normal.      Left Ear: External ear normal.      Nose: Nose normal.      Mouth/Throat:      Pharynx: No oropharyngeal exudate.   Eyes:      General: No scleral icterus.        Right eye: No discharge.         Left eye: No discharge.      Pupils: Pupils are equal, round, and reactive to light.   Neck:      Thyroid: No thyromegaly.   Cardiovascular:      Rate and Rhythm: Normal rate.      Heart sounds: Normal heart sounds. No murmur heard.  Pulmonary:      Effort: Pulmonary effort is normal. No respiratory distress.      Breath sounds: Normal breath sounds. No wheezing.   Abdominal:      General: Bowel sounds are normal. There is no distension.      Palpations: Abdomen is soft. There is no mass.      Tenderness: There is no abdominal tenderness. There is no guarding or rebound.   Musculoskeletal:         General: Normal range of motion.   Skin:     General: Skin is warm and dry.      Findings: No erythema or rash.   Neurological:      Mental Status: She is alert.      Coordination: Coordination normal.      Deep Tendon Reflexes: Reflexes normal.   Psychiatric:         Behavior: Behavior normal.                Frank Lombardi, DO

## 2024-05-22 DIAGNOSIS — M79.18 MYOFASCIAL PAIN SYNDROME, CERVICAL: ICD-10-CM

## 2024-05-22 DIAGNOSIS — F51.01 PRIMARY INSOMNIA: ICD-10-CM

## 2024-05-22 RX ORDER — ZOLPIDEM TARTRATE 5 MG/1
2.5 TABLET ORAL
Qty: 15 TABLET | Refills: 0 | Status: SHIPPED | OUTPATIENT
Start: 2024-05-22

## 2024-05-22 RX ORDER — CYCLOBENZAPRINE HCL 10 MG
10 TABLET ORAL 3 TIMES DAILY PRN
Qty: 15 TABLET | Refills: 0 | Status: SHIPPED | OUTPATIENT
Start: 2024-05-22

## 2024-05-23 ENCOUNTER — APPOINTMENT (OUTPATIENT)
Dept: LAB | Facility: CLINIC | Age: 44
End: 2024-05-23
Payer: COMMERCIAL

## 2024-05-23 DIAGNOSIS — R53.83 OTHER FATIGUE: ICD-10-CM

## 2024-05-23 DIAGNOSIS — D50.9 IRON DEFICIENCY ANEMIA, UNSPECIFIED IRON DEFICIENCY ANEMIA TYPE: ICD-10-CM

## 2024-05-23 DIAGNOSIS — Z83.2 FAMILY HISTORY OF SICKLE CELL TRAIT: ICD-10-CM

## 2024-05-23 LAB
ANA SER QL IA: NEGATIVE
BASOPHILS # BLD AUTO: 0.05 THOUSANDS/ÂΜL (ref 0–0.1)
BASOPHILS NFR BLD AUTO: 1 % (ref 0–1)
EOSINOPHIL # BLD AUTO: 0.19 THOUSAND/ÂΜL (ref 0–0.61)
EOSINOPHIL NFR BLD AUTO: 4 % (ref 0–6)
ERYTHROCYTE [DISTWIDTH] IN BLOOD BY AUTOMATED COUNT: 14.4 % (ref 11.6–15.1)
FERRITIN SERPL-MCNC: 3 NG/ML (ref 11–307)
HCT VFR BLD AUTO: 37.8 % (ref 34.8–46.1)
HGB BLD-MCNC: 12 G/DL (ref 11.5–15.4)
HGB RETIC QN AUTO: 32.5 PG (ref 30–38.3)
IMM GRANULOCYTES # BLD AUTO: 0.01 THOUSAND/UL (ref 0–0.2)
IMM GRANULOCYTES NFR BLD AUTO: 0 % (ref 0–2)
IMM RETICS NFR: 17.2 % (ref 0–14)
IRON SATN MFR SERPL: 9 % (ref 15–50)
IRON SERPL-MCNC: 30 UG/DL (ref 50–212)
LYMPHOCYTES # BLD AUTO: 1.9 THOUSANDS/ÂΜL (ref 0.6–4.47)
LYMPHOCYTES NFR BLD AUTO: 41 % (ref 14–44)
MCH RBC QN AUTO: 29.8 PG (ref 26.8–34.3)
MCHC RBC AUTO-ENTMCNC: 31.7 G/DL (ref 31.4–37.4)
MCV RBC AUTO: 94 FL (ref 82–98)
MONOCYTES # BLD AUTO: 0.47 THOUSAND/ÂΜL (ref 0.17–1.22)
MONOCYTES NFR BLD AUTO: 10 % (ref 4–12)
NEUTROPHILS # BLD AUTO: 2.02 THOUSANDS/ÂΜL (ref 1.85–7.62)
NEUTS SEG NFR BLD AUTO: 44 % (ref 43–75)
NRBC BLD AUTO-RTO: 0 /100 WBCS
PLATELET # BLD AUTO: 317 THOUSANDS/UL (ref 149–390)
PMV BLD AUTO: 9.9 FL (ref 8.9–12.7)
RBC # BLD AUTO: 4.03 MILLION/UL (ref 3.81–5.12)
RETICS # AUTO: ABNORMAL 10*3/UL (ref 14097–95744)
RETICS # CALC: 1.37 % (ref 0.37–1.87)
T4 FREE SERPL-MCNC: 0.83 NG/DL (ref 0.61–1.12)
TIBC SERPL-MCNC: 328 UG/DL (ref 250–450)
TSH SERPL DL<=0.05 MIU/L-ACNC: 0.95 UIU/ML (ref 0.45–4.5)
UIBC SERPL-MCNC: 298 UG/DL (ref 155–355)
WBC # BLD AUTO: 4.64 THOUSAND/UL (ref 4.31–10.16)

## 2024-05-23 PROCEDURE — 36415 COLL VENOUS BLD VENIPUNCTURE: CPT

## 2024-05-23 PROCEDURE — 86038 ANTINUCLEAR ANTIBODIES: CPT

## 2024-05-23 PROCEDURE — 84443 ASSAY THYROID STIM HORMONE: CPT

## 2024-05-23 PROCEDURE — 85025 COMPLETE CBC W/AUTO DIFF WBC: CPT

## 2024-05-23 PROCEDURE — 84439 ASSAY OF FREE THYROXINE: CPT

## 2024-05-23 PROCEDURE — 85660 RBC SICKLE CELL TEST: CPT

## 2024-05-23 PROCEDURE — 83550 IRON BINDING TEST: CPT

## 2024-05-23 PROCEDURE — 82728 ASSAY OF FERRITIN: CPT

## 2024-05-23 PROCEDURE — 83540 ASSAY OF IRON: CPT

## 2024-05-23 PROCEDURE — 86430 RHEUMATOID FACTOR TEST QUAL: CPT

## 2024-05-23 PROCEDURE — 85046 RETICYTE/HGB CONCENTRATE: CPT

## 2024-05-24 ENCOUNTER — TELEPHONE (OUTPATIENT)
Dept: FAMILY MEDICINE CLINIC | Facility: CLINIC | Age: 44
End: 2024-05-24

## 2024-05-24 DIAGNOSIS — E61.1 IRON DEFICIENCY: Primary | ICD-10-CM

## 2024-05-24 LAB
RHEUMATOID FACT SER QL LA: NEGATIVE
SICKLE CELLS BLD QL SMEAR: NEGATIVE

## 2024-05-24 RX ORDER — FERROUS SULFATE 7.5 MG/0.5
15 SYRINGE (EA) ORAL DAILY
Qty: 90 ML | Refills: 1 | Status: SHIPPED | OUTPATIENT
Start: 2024-05-24 | End: 2024-11-20

## 2024-05-24 NOTE — TELEPHONE ENCOUNTER
Pt calling back stating that she would try a prescription iron supplement if it will be easier on her stomach.

## 2024-05-24 NOTE — TELEPHONE ENCOUNTER
Patient has been trying different iron supplements, but they are irritating her stomach  Please advise

## 2024-05-24 NOTE — TELEPHONE ENCOUNTER
Please call pt, labs are back she is iron def - but she is not anemic.  I would suggest taking otc iron QD,  We will redraw the numbers in a month

## 2024-06-26 DIAGNOSIS — F51.01 PRIMARY INSOMNIA: ICD-10-CM

## 2024-06-26 DIAGNOSIS — J01.00 SUBACUTE MAXILLARY SINUSITIS: ICD-10-CM

## 2024-06-26 DIAGNOSIS — M79.18 MYOFASCIAL PAIN SYNDROME, CERVICAL: ICD-10-CM

## 2024-06-26 DIAGNOSIS — L70.0 ACNE VULGARIS: ICD-10-CM

## 2024-06-26 RX ORDER — TRETINOIN 0.5 MG/G
CREAM TOPICAL
Qty: 45 G | Refills: 1 | Status: SHIPPED | OUTPATIENT
Start: 2024-06-26

## 2024-06-26 RX ORDER — ZOLPIDEM TARTRATE 5 MG/1
2.5 TABLET ORAL
Qty: 15 TABLET | Refills: 0 | Status: SHIPPED | OUTPATIENT
Start: 2024-06-26

## 2024-06-26 RX ORDER — FLUTICASONE PROPIONATE 50 MCG
2 SPRAY, SUSPENSION (ML) NASAL DAILY
Qty: 16 G | Refills: 3 | Status: SHIPPED | OUTPATIENT
Start: 2024-06-26

## 2024-06-26 RX ORDER — CYCLOBENZAPRINE HCL 10 MG
10 TABLET ORAL 3 TIMES DAILY PRN
Qty: 15 TABLET | Refills: 0 | Status: SHIPPED | OUTPATIENT
Start: 2024-06-26

## 2024-07-28 DIAGNOSIS — F51.01 PRIMARY INSOMNIA: ICD-10-CM

## 2024-07-28 DIAGNOSIS — M79.18 MYOFASCIAL PAIN SYNDROME, CERVICAL: ICD-10-CM

## 2024-07-29 RX ORDER — CYCLOBENZAPRINE HCL 10 MG
10 TABLET ORAL 3 TIMES DAILY PRN
Qty: 15 TABLET | Refills: 0 | Status: SHIPPED | OUTPATIENT
Start: 2024-07-29

## 2024-07-29 RX ORDER — ZOLPIDEM TARTRATE 5 MG/1
2.5 TABLET ORAL
Qty: 15 TABLET | Refills: 0 | Status: SHIPPED | OUTPATIENT
Start: 2024-07-29

## 2024-08-27 ENCOUNTER — LAB (OUTPATIENT)
Dept: LAB | Facility: CLINIC | Age: 44
End: 2024-08-27
Payer: COMMERCIAL

## 2024-08-27 DIAGNOSIS — F51.01 PRIMARY INSOMNIA: ICD-10-CM

## 2024-08-27 DIAGNOSIS — E61.1 IRON DEFICIENCY: ICD-10-CM

## 2024-08-27 DIAGNOSIS — L70.0 ACNE VULGARIS: ICD-10-CM

## 2024-08-27 LAB
BASOPHILS # BLD AUTO: 0.06 THOUSANDS/ÂΜL (ref 0–0.1)
BASOPHILS NFR BLD AUTO: 1 % (ref 0–1)
EOSINOPHIL # BLD AUTO: 0.25 THOUSAND/ÂΜL (ref 0–0.61)
EOSINOPHIL NFR BLD AUTO: 5 % (ref 0–6)
ERYTHROCYTE [DISTWIDTH] IN BLOOD BY AUTOMATED COUNT: 14.4 % (ref 11.6–15.1)
HCT VFR BLD AUTO: 36.9 % (ref 34.8–46.1)
HGB BLD-MCNC: 11.6 G/DL (ref 11.5–15.4)
IMM GRANULOCYTES # BLD AUTO: 0 THOUSAND/UL (ref 0–0.2)
IMM GRANULOCYTES NFR BLD AUTO: 0 % (ref 0–2)
LYMPHOCYTES # BLD AUTO: 2 THOUSANDS/ÂΜL (ref 0.6–4.47)
LYMPHOCYTES NFR BLD AUTO: 40 % (ref 14–44)
MCH RBC QN AUTO: 29.4 PG (ref 26.8–34.3)
MCHC RBC AUTO-ENTMCNC: 31.4 G/DL (ref 31.4–37.4)
MCV RBC AUTO: 93 FL (ref 82–98)
MONOCYTES # BLD AUTO: 0.4 THOUSAND/ÂΜL (ref 0.17–1.22)
MONOCYTES NFR BLD AUTO: 8 % (ref 4–12)
NEUTROPHILS # BLD AUTO: 2.31 THOUSANDS/ÂΜL (ref 1.85–7.62)
NEUTS SEG NFR BLD AUTO: 46 % (ref 43–75)
NRBC BLD AUTO-RTO: 0 /100 WBCS
PLATELET # BLD AUTO: 331 THOUSANDS/UL (ref 149–390)
PMV BLD AUTO: 10.1 FL (ref 8.9–12.7)
RBC # BLD AUTO: 3.95 MILLION/UL (ref 3.81–5.12)
WBC # BLD AUTO: 5.02 THOUSAND/UL (ref 4.31–10.16)

## 2024-08-27 PROCEDURE — 36415 COLL VENOUS BLD VENIPUNCTURE: CPT

## 2024-08-27 PROCEDURE — 85025 COMPLETE CBC W/AUTO DIFF WBC: CPT

## 2024-08-28 RX ORDER — TRETINOIN 0.5 MG/G
CREAM TOPICAL
Qty: 45 G | Refills: 5 | Status: SHIPPED | OUTPATIENT
Start: 2024-08-28

## 2024-08-28 RX ORDER — ZOLPIDEM TARTRATE 5 MG/1
2.5 TABLET ORAL
Qty: 15 TABLET | Refills: 0 | Status: SHIPPED | OUTPATIENT
Start: 2024-08-28

## 2024-09-24 ENCOUNTER — OFFICE VISIT (OUTPATIENT)
Dept: FAMILY MEDICINE CLINIC | Facility: CLINIC | Age: 44
End: 2024-09-24
Payer: COMMERCIAL

## 2024-09-24 VITALS
WEIGHT: 129 LBS | HEART RATE: 99 BPM | BODY MASS INDEX: 22.86 KG/M2 | OXYGEN SATURATION: 99 % | TEMPERATURE: 97 F | SYSTOLIC BLOOD PRESSURE: 110 MMHG | HEIGHT: 63 IN | DIASTOLIC BLOOD PRESSURE: 68 MMHG | RESPIRATION RATE: 18 BRPM

## 2024-09-24 DIAGNOSIS — Z13.6 SCREENING FOR CARDIOVASCULAR CONDITION: ICD-10-CM

## 2024-09-24 DIAGNOSIS — F51.01 PRIMARY INSOMNIA: ICD-10-CM

## 2024-09-24 DIAGNOSIS — M79.18 MYOFASCIAL PAIN SYNDROME OF THORACIC SPINE: ICD-10-CM

## 2024-09-24 DIAGNOSIS — I10 ESSENTIAL HYPERTENSION: ICD-10-CM

## 2024-09-24 DIAGNOSIS — E61.1 IRON DEFICIENCY: ICD-10-CM

## 2024-09-24 DIAGNOSIS — Z00.00 WELL ADULT EXAM: Primary | ICD-10-CM

## 2024-09-24 PROCEDURE — 99396 PREV VISIT EST AGE 40-64: CPT | Performed by: FAMILY MEDICINE

## 2024-09-24 RX ORDER — CYCLOBENZAPRINE HCL 10 MG
10 TABLET ORAL 3 TIMES DAILY PRN
Qty: 15 TABLET | Refills: 0 | Status: SHIPPED | OUTPATIENT
Start: 2024-09-24

## 2024-09-24 RX ORDER — FERROUS SULFATE 7.5 MG/0.5
15 SYRINGE (EA) ORAL DAILY
Qty: 90 ML | Refills: 1 | Status: SHIPPED | OUTPATIENT
Start: 2024-09-24 | End: 2025-03-23

## 2024-09-24 RX ORDER — ZOLPIDEM TARTRATE 5 MG/1
2.5 TABLET ORAL
Qty: 15 TABLET | Refills: 0 | Status: SHIPPED | OUTPATIENT
Start: 2024-09-24

## 2024-09-24 NOTE — PROGRESS NOTES
FAMILY PRACTICE HEALTH MAINTENANCE OFFICE VISIT  [facility] - [facility]    NAME: Yakelin Bay  AGE: 44 y.o. SEX: female  : 1980     DATE: 2024    Assessment and Plan     1. Well adult exam  2. Essential hypertension  -     Comprehensive metabolic panel; Future  -     Lipid Panel with Direct LDL reflex; Future  -     CBC; Future  3. Iron deficiency  -     ferrous sulfate (EDIS-IN-SOL) 75 (15 Fe) mg/mL drops; Take 1 mL (15 mg of iron total) by mouth daily  -     Iron Panel (Includes Ferritin, Iron Sat%, Iron, and TIBC); Future  4. Screening for cardiovascular condition  -     Comprehensive metabolic panel; Future  -     Lipid Panel with Direct LDL reflex; Future  -     CBC; Future  5. Myofascial pain syndrome of thoracic spine  -     cyclobenzaprine (FLEXERIL) 10 mg tablet; Take 1 tablet (10 mg total) by mouth 3 (three) times a day as needed for muscle spasms May cause drowsiness  -     Ambulatory Referral to Physical Therapy; Future  6. Primary insomnia      Patient Counseling:   Nutrition: Stressed importance of a well balanced diet, moderation of sodium/saturated fat, caloric balance and sufficient intake of fiber  Exercise: Stressed the importance of regular exercise with a goal of 150 minutes per week  Dental Health: Discussed daily flossing and brushing and regular dental visits     Immunizations reviewed: Declined recommended vaccinations  Discussed benefits of:  Mammogram , Cervical Cancer screening, and Screening labs.  BMI Counseling: Body mass index is 22.85 kg/m². Discussed with patient's BMI with her. The BMI is acceptable    No follow-ups on file.        Chief Complaint     Chief Complaint   Patient presents with    Physical Exam     [id number]       History of Present Illness     Pt is sched for a full physical  Pt will need labs\      Pt has an OBGYN that dopes papa and pelvic - will call to make an appt    Pt would like her flexeril refilled - this has been  going on for two years - it was very seldom now more often.  Can be sharp when she moves a certain way.  Prev Pcp - referred the pt to spine - pt did not         Well Adult Physical   Patient here for a comprehensive physical exam.      Diet and Physical Activity  Diet: well balanced diet  Exercise: daily      Depression Screen  PHQ-2/9 Depression Screening    Little interest or pleasure in doing things: 0 - not at all  Feeling down, depressed, or hopeless: 0 - not at all  PHQ-2 Score: 0  PHQ-2 Interpretation: Negative depression screen          General Health  Hearing: Normal:  bilateral  Vision: wears glasses  Dental: regular dental visits    Reproductive Health  No issues       The following portions of the patient's history were reviewed and updated as appropriate: allergies, current medications, past family history, past medical history, past social history, past surgical history and problem list.    Review of Systems     Review of Systems   Constitutional: Negative.  Negative for activity change, appetite change, chills, diaphoresis and fatigue.   HENT: Negative.  Negative for dental problem, ear pain, sinus pressure and sore throat.    Eyes: Negative.  Negative for photophobia, pain, discharge, redness, itching and visual disturbance.   Respiratory:  Negative for apnea and chest tightness.    Cardiovascular: Negative.  Negative for chest pain, palpitations and leg swelling.   Gastrointestinal: Negative.  Negative for abdominal distention, abdominal pain, constipation and diarrhea.   Endocrine: Negative.  Negative for cold intolerance and heat intolerance.   Genitourinary: Negative.  Negative for difficulty urinating and dyspareunia.   Musculoskeletal:  Positive for myalgias. Negative for arthralgias and back pain.   Skin: Negative.    Allergic/Immunologic: Negative for environmental allergies.   Neurological: Negative.  Negative for dizziness.   Psychiatric/Behavioral: Negative.  Negative for agitation.         Past Medical History     Past Medical History:   Diagnosis Date    Chronic fatigue     last assessed 16    Endometriosis     GERD (gastroesophageal reflux disease) 2016 Intermittent, especially when was pregnant. Ok now    Hypoglycemia     Migraine     Ovarian cyst     Scoliosis     last assessed  10/21/13    Tachycardia 2019    Varicella     childhood    Vitamin D deficiency 2016 Takes 1000 units of Vit. D       Past Surgical History     Past Surgical History:   Procedure Laterality Date     SECTION      x2    ECTOPIC PREGNANCY SURGERY      LAPAROSCOPY      exploratory    AR  DELIVERY ONLY N/A 3/26/2020    Procedure:  SECTION () REPEAT;  Surgeon: Jinny Berry MD;  Location: [facility] ;  Service: Obstetrics    SALPINGECTOMY         Social History     Social History     Socioeconomic History    Marital status: /Civil Union     Spouse name: None    Number of children: None    Years of education: None    Highest education level: None   Occupational History    Occupation:      Comment: at night    Occupation: Nurse, 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   Tobacco Use    Smoking status: Former     Current packs/day: 0.00     Average packs/day: 0.3 packs/day for 2.0 years (0.5 ttl pk-yrs)     Types: Cigarettes     Start date: 2002     Quit date: 2010     Years since quittin.7     Passive exposure: Never    Smokeless tobacco: Never   Vaping Use    Vaping status: Never Used   Substance and Sexual Activity    Alcohol use: Not Currently     Comment: once a year    Drug use: No    Sexual activity: Yes     Partners: Male   Other Topics Concern    None   Social History Narrative    None     Social Determinants of Health     Financial Resource Strain: Low Risk  (2024)    Overall Financial Resource Strain (CARDIA)     Difficulty of Paying Living Expenses: Not hard at all   Food Insecurity: No Food Insecurity  (4/9/2024)    Hunger Vital Sign     Worried About Running Out of Food in the Last Year: Never true     Ran Out of Food in the Last Year: Never true   Transportation Needs: No Transportation Needs (4/9/2024)    PRAPARE - Transportation     Lack of Transportation (Medical): No     Lack of Transportation (Non-Medical): No   Physical Activity: Not on file   Stress: Not on file   Social Connections: Unknown (6/18/2024)    Received from [employer]    Social 360pi     How often do you feel lonely or isolated from those around you? (Adult - for ages 18 years and over): Not on file   Intimate Partner Violence: Not on file   Housing Stability: Low Risk  (4/9/2024)    Housing Stability Vital Sign     Unable to Pay for Housing in the Last Year: No     Number of Times Moved in the Last Year: 1     Homeless in the Last Year: No       Family History     Family History   Problem Relation Age of Onset    Hypertension Mother     Cancer Mother         skin    Hyperlipidemia Mother     Hyperlipidemia Father     Hypertension Sister     Heart disease Sister         MVP    Other Daughter         sickle cell trait    Diabetes Maternal Grandmother         type 2    Heart disease Maternal Grandmother     Cancer Maternal Grandfather         throat, oral, lung    Stroke Maternal Grandfather     Cancer Paternal Grandmother         brain    Heart disease Paternal Grandmother         MI    Other Paternal Grandfather     Lung cancer Paternal Grandfather         dementia    Cancer Maternal Uncle         lung, prostate    Other Maternal Uncle         agent orange exp., smoker    Alcohol abuse Maternal Uncle     Breast cancer Neg Hx        Current Medications       Current Outpatient Medications:     acetaminophen (TYLENOL) 325 mg tablet, Take 650 mg by mouth every 6 (six) hours as needed for mild pain, Disp: , Rfl:     carvedilol (COREG) 3.125 mg tablet, , Disp: , Rfl:     cyclobenzaprine (FLEXERIL) 10 mg tablet, Take 1 tablet (10 mg total) by  "mouth 3 (three) times a day as needed for muscle spasms May cause drowsiness, Disp: 15 tablet, Rfl: 0    ferrous sulfate (EDIS-IN-SOL) 75 (15 Fe) mg/mL drops, Take 1 mL (15 mg of iron total) by mouth daily, Disp: 90 mL, Rfl: 1    fexofenadine (ALLEGRA) 180 MG tablet, Take 1 tablet (180 mg total) by mouth daily, Disp: 30 tablet, Rfl: 1    fluticasone (FLONASE) 50 mcg/act nasal spray, 2 sprays into each nostril daily Shake liquid and spray, Disp: 16 g, Rfl: 3    ibuprofen (MOTRIN) 800 mg tablet, Take by mouth every 6 (six) hours as needed for mild pain, Disp: , Rfl:     lisinopril (ZESTRIL) 5 mg tablet, , Disp: , Rfl:     Multiple Vitamin (multivitamin) tablet, Take 1 tablet by mouth daily, Disp: , Rfl:     Omega-3 Fatty Acids (FISH OIL) 1,000 mg, Take 1,000 mg by mouth daily, Disp: , Rfl:     tretinoin (Retin-A) 0.05 % cream, Apply topically daily at bedtime, Disp: 45 g, Rfl: 5    zolpidem (AMBIEN) 5 mg tablet, Take 0.5 tablets (2.5 mg total) by mouth daily at bedtime as needed for sleep Use sparingly. Can be habit forming., Disp: 15 tablet, Rfl: 0     Allergies     Allergies   Allergen Reactions    Cephalexin      Chest pain    Levofloxacin Diarrhea    Sulfa Antibiotics Hives     With swelling       Objective     /68   Pulse 99   Temp (!) 97 °F (36.1 °C)   Resp 18   Ht 5' 3\" (1.6 m)   Wt 58.5 kg (129 lb)   LMP 09/17/2024 (Exact Date)   SpO2 99%   BMI 22.85 kg/m²      Physical Exam  Vitals and nursing note reviewed.   Constitutional:       General: She is not in acute distress.     Appearance: She is well-developed. She is not diaphoretic.   HENT:      Head: Normocephalic and atraumatic.      Right Ear: External ear normal.      Left Ear: External ear normal.      Nose: Nose normal.      Mouth/Throat:      Pharynx: No oropharyngeal exudate.   Eyes:      General: No scleral icterus.        Right eye: No discharge.         Left eye: No discharge.      Pupils: Pupils are equal, round, and reactive to light. "   Neck:      Thyroid: No thyromegaly.   Cardiovascular:      Rate and Rhythm: Normal rate.      Heart sounds: Normal heart sounds. No murmur heard.  Pulmonary:      Effort: Pulmonary effort is normal. No respiratory distress.      Breath sounds: Normal breath sounds. No wheezing.   Abdominal:      General: Bowel sounds are normal. There is no distension.      Palpations: Abdomen is soft. There is no mass.      Tenderness: There is no abdominal tenderness. There is no guarding or rebound.   Musculoskeletal:         General: Normal range of motion.   Skin:     General: Skin is warm and dry.      Findings: No erythema or rash.   Neurological:      Mental Status: She is alert.      Coordination: Coordination normal.      Deep Tendon Reflexes: Reflexes normal.   Psychiatric:         Behavior: Behavior normal.           Vision Screening    Right eye Left eye Both eyes   Without correction 20/40 20/25 20/25   With correction      Comments: Forgot glasses          Frank Lombardi, [facility]

## 2024-09-25 ENCOUNTER — PATIENT MESSAGE (OUTPATIENT)
Dept: FAMILY MEDICINE CLINIC | Facility: CLINIC | Age: 44
End: 2024-09-25

## 2024-10-25 DIAGNOSIS — M79.18 MYOFASCIAL PAIN SYNDROME OF THORACIC SPINE: ICD-10-CM

## 2024-10-25 DIAGNOSIS — F51.01 PRIMARY INSOMNIA: ICD-10-CM

## 2024-10-25 RX ORDER — CYCLOBENZAPRINE HCL 10 MG
10 TABLET ORAL 3 TIMES DAILY PRN
Qty: 15 TABLET | Refills: 0 | Status: SHIPPED | OUTPATIENT
Start: 2024-10-25

## 2024-10-25 RX ORDER — ZOLPIDEM TARTRATE 5 MG/1
2.5 TABLET ORAL
Qty: 15 TABLET | Refills: 0 | Status: SHIPPED | OUTPATIENT
Start: 2024-10-25

## 2024-11-22 ENCOUNTER — APPOINTMENT (OUTPATIENT)
Dept: LAB | Facility: CLINIC | Age: 44
End: 2024-11-22
Payer: COMMERCIAL

## 2024-11-22 DIAGNOSIS — E61.1 IRON DEFICIENCY: ICD-10-CM

## 2024-11-22 DIAGNOSIS — I10 ESSENTIAL HYPERTENSION: ICD-10-CM

## 2024-11-22 DIAGNOSIS — Z13.6 SCREENING FOR CARDIOVASCULAR CONDITION: ICD-10-CM

## 2024-11-22 LAB
ALBUMIN SERPL BCG-MCNC: 4 G/DL (ref 3.5–5)
ALP SERPL-CCNC: 52 U/L (ref 34–104)
ALT SERPL W P-5'-P-CCNC: 12 U/L (ref 7–52)
ANION GAP SERPL CALCULATED.3IONS-SCNC: 6 MMOL/L (ref 4–13)
AST SERPL W P-5'-P-CCNC: 16 U/L (ref 13–39)
BILIRUB SERPL-MCNC: 0.32 MG/DL (ref 0.2–1)
BUN SERPL-MCNC: 17 MG/DL (ref 5–25)
CALCIUM SERPL-MCNC: 8.4 MG/DL (ref 8.4–10.2)
CHLORIDE SERPL-SCNC: 103 MMOL/L (ref 96–108)
CO2 SERPL-SCNC: 25 MMOL/L (ref 21–32)
CREAT SERPL-MCNC: 0.69 MG/DL (ref 0.6–1.3)
ERYTHROCYTE [DISTWIDTH] IN BLOOD BY AUTOMATED COUNT: 14.1 % (ref 11.6–15.1)
FERRITIN SERPL-MCNC: 5 NG/ML (ref 11–307)
GFR SERPL CREATININE-BSD FRML MDRD: 107 ML/MIN/1.73SQ M
GLUCOSE P FAST SERPL-MCNC: 80 MG/DL (ref 65–99)
HCT VFR BLD AUTO: 36.5 % (ref 34.8–46.1)
HGB BLD-MCNC: 11.5 G/DL (ref 11.5–15.4)
IRON SATN MFR SERPL: 9 % (ref 15–50)
IRON SERPL-MCNC: 31 UG/DL (ref 50–212)
MCH RBC QN AUTO: 28.1 PG (ref 26.8–34.3)
MCHC RBC AUTO-ENTMCNC: 31.5 G/DL (ref 31.4–37.4)
MCV RBC AUTO: 89 FL (ref 82–98)
PLATELET # BLD AUTO: 322 THOUSANDS/UL (ref 149–390)
PMV BLD AUTO: 10 FL (ref 8.9–12.7)
POTASSIUM SERPL-SCNC: 4.4 MMOL/L (ref 3.5–5.3)
PROT SERPL-MCNC: 6.3 G/DL (ref 6.4–8.4)
RBC # BLD AUTO: 4.09 MILLION/UL (ref 3.81–5.12)
SODIUM SERPL-SCNC: 134 MMOL/L (ref 135–147)
TIBC SERPL-MCNC: 343 UG/DL (ref 250–450)
UIBC SERPL-MCNC: 312 UG/DL (ref 155–355)
WBC # BLD AUTO: 5.53 THOUSAND/UL (ref 4.31–10.16)

## 2024-11-22 PROCEDURE — 83540 ASSAY OF IRON: CPT

## 2024-11-22 PROCEDURE — 36415 COLL VENOUS BLD VENIPUNCTURE: CPT

## 2024-11-22 PROCEDURE — 85027 COMPLETE CBC AUTOMATED: CPT

## 2024-11-22 PROCEDURE — 82728 ASSAY OF FERRITIN: CPT

## 2024-11-22 PROCEDURE — 83550 IRON BINDING TEST: CPT

## 2024-11-22 PROCEDURE — 80053 COMPREHEN METABOLIC PANEL: CPT

## 2024-11-25 DIAGNOSIS — Z00.6 ENCOUNTER FOR EXAMINATION FOR NORMAL COMPARISON OR CONTROL IN CLINICAL RESEARCH PROGRAM: ICD-10-CM

## 2024-11-27 ENCOUNTER — RESULTS FOLLOW-UP (OUTPATIENT)
Dept: FAMILY MEDICINE CLINIC | Facility: CLINIC | Age: 44
End: 2024-11-27

## 2024-11-27 NOTE — RESULT ENCOUNTER NOTE
CBC reveals a normal hemoglobin, Your ferritin however is low.  I would engage in an OTC iron supplement unless you already do so.  This can be in the form of a Multivitamin or a stand alone ferrous sulfate supplement.

## 2024-11-29 DIAGNOSIS — E61.1 IRON DEFICIENCY: ICD-10-CM

## 2024-11-29 DIAGNOSIS — F51.01 PRIMARY INSOMNIA: ICD-10-CM

## 2024-11-29 RX ORDER — ZOLPIDEM TARTRATE 5 MG/1
2.5 TABLET ORAL
Qty: 15 TABLET | Refills: 0 | Status: SHIPPED | OUTPATIENT
Start: 2024-11-29

## 2024-11-29 RX ORDER — FERROUS SULFATE 7.5 MG/0.5
15 SYRINGE (EA) ORAL DAILY
Qty: 90 ML | Refills: 0 | Status: SHIPPED | OUTPATIENT
Start: 2024-11-29 | End: 2025-05-28

## 2025-01-02 DIAGNOSIS — F51.01 PRIMARY INSOMNIA: ICD-10-CM

## 2025-01-02 DIAGNOSIS — M79.18 MYOFASCIAL PAIN SYNDROME OF THORACIC SPINE: ICD-10-CM

## 2025-01-02 RX ORDER — CYCLOBENZAPRINE HCL 10 MG
10 TABLET ORAL 3 TIMES DAILY PRN
Qty: 15 TABLET | Refills: 0 | Status: SHIPPED | OUTPATIENT
Start: 2025-01-02

## 2025-01-02 RX ORDER — ZOLPIDEM TARTRATE 5 MG/1
2.5 TABLET ORAL
Qty: 15 TABLET | Refills: 0 | Status: SHIPPED | OUTPATIENT
Start: 2025-01-02

## 2025-02-03 DIAGNOSIS — E61.1 IRON DEFICIENCY: ICD-10-CM

## 2025-02-03 DIAGNOSIS — F51.01 PRIMARY INSOMNIA: ICD-10-CM

## 2025-02-04 RX ORDER — ZOLPIDEM TARTRATE 5 MG/1
2.5 TABLET ORAL
Qty: 15 TABLET | Refills: 0 | Status: SHIPPED | OUTPATIENT
Start: 2025-02-04

## 2025-02-04 RX ORDER — FERROUS SULFATE 7.5 MG/0.5
15 SYRINGE (EA) ORAL DAILY
Qty: 30 ML | Refills: 0 | Status: SHIPPED | OUTPATIENT
Start: 2025-02-04 | End: 2025-08-03

## 2025-03-03 DIAGNOSIS — E61.1 IRON DEFICIENCY: ICD-10-CM

## 2025-03-03 DIAGNOSIS — M79.18 MYOFASCIAL PAIN SYNDROME OF THORACIC SPINE: ICD-10-CM

## 2025-03-03 DIAGNOSIS — F51.01 PRIMARY INSOMNIA: ICD-10-CM

## 2025-03-04 RX ORDER — CYCLOBENZAPRINE HCL 10 MG
10 TABLET ORAL 3 TIMES DAILY PRN
Qty: 15 TABLET | Refills: 0 | Status: SHIPPED | OUTPATIENT
Start: 2025-03-04

## 2025-03-04 RX ORDER — ZOLPIDEM TARTRATE 5 MG/1
2.5 TABLET ORAL
Qty: 15 TABLET | Refills: 0 | Status: SHIPPED | OUTPATIENT
Start: 2025-03-04

## 2025-03-04 RX ORDER — FERROUS SULFATE 7.5 MG/0.5
15 SYRINGE (EA) ORAL DAILY
Qty: 30 ML | Refills: 0 | Status: SHIPPED | OUTPATIENT
Start: 2025-03-04 | End: 2025-08-31

## 2025-04-03 DIAGNOSIS — E61.1 IRON DEFICIENCY: ICD-10-CM

## 2025-04-03 DIAGNOSIS — F51.01 PRIMARY INSOMNIA: ICD-10-CM

## 2025-04-04 ENCOUNTER — OFFICE VISIT (OUTPATIENT)
Dept: FAMILY MEDICINE CLINIC | Facility: CLINIC | Age: 45
End: 2025-04-04
Payer: COMMERCIAL

## 2025-04-04 VITALS
BODY MASS INDEX: 22.57 KG/M2 | WEIGHT: 127.4 LBS | DIASTOLIC BLOOD PRESSURE: 70 MMHG | HEIGHT: 63 IN | TEMPERATURE: 97.3 F | SYSTOLIC BLOOD PRESSURE: 100 MMHG | HEART RATE: 64 BPM

## 2025-04-04 DIAGNOSIS — B96.89 ACUTE BACTERIAL SINUSITIS: Primary | ICD-10-CM

## 2025-04-04 DIAGNOSIS — J01.90 ACUTE BACTERIAL SINUSITIS: Primary | ICD-10-CM

## 2025-04-04 PROCEDURE — 99213 OFFICE O/P EST LOW 20 MIN: CPT | Performed by: FAMILY MEDICINE

## 2025-04-04 RX ORDER — FERROUS SULFATE 7.5 MG/0.5
15 SYRINGE (EA) ORAL DAILY
Qty: 30 ML | Refills: 0 | Status: SHIPPED | OUTPATIENT
Start: 2025-04-04 | End: 2025-10-01

## 2025-04-04 RX ORDER — BENZONATATE 200 MG/1
200 CAPSULE ORAL 3 TIMES DAILY PRN
Qty: 30 CAPSULE | Refills: 0 | Status: SHIPPED | OUTPATIENT
Start: 2025-04-04

## 2025-04-04 RX ORDER — ZOLPIDEM TARTRATE 5 MG/1
2.5 TABLET ORAL
Qty: 15 TABLET | Refills: 0 | Status: SHIPPED | OUTPATIENT
Start: 2025-04-04

## 2025-04-04 NOTE — PROGRESS NOTES
"Name: Yakelin Bay      : 1980      MRN: [id number]  Encounter Provider: Frank Lombardi, DO  Encounter Date: 2025   Encounter department: [facility]  :  Assessment & Plan  Acute bacterial sinusitis    Orders:    amoxicillin-clavulanate (AUGMENTIN) 875-125 mg per tablet; Take 1 tablet by mouth every 12 (twelve) hours for 7 days    benzonatate (TESSALON) 200 MG capsule; Take 1 capsule (200 mg total) by mouth 3 (three) times a day as needed for cough           History of Present Illness   Congestion  Sore throat ear pain  Has been 5 days      Review of Systems   HENT:  Positive for congestion, sinus pressure and sore throat.    Respiratory:  Positive for cough.        Objective   /70   Pulse 64   Temp (!) 97.3 °F (36.3 °C)   Ht 5' 3\" (1.6 m)   Wt 57.8 kg (127 lb 6.4 oz)   LMP 2025 (Approximate)   BMI 22.57 kg/m²      Physical Exam  HENT:      Nose: Congestion and rhinorrhea present.         "

## 2025-05-08 DIAGNOSIS — F51.01 PRIMARY INSOMNIA: ICD-10-CM

## 2025-05-09 RX ORDER — ZOLPIDEM TARTRATE 5 MG/1
2.5 TABLET ORAL
Qty: 15 TABLET | Refills: 0 | Status: SHIPPED | OUTPATIENT
Start: 2025-05-09

## 2025-05-09 NOTE — TELEPHONE ENCOUNTER
Called pt. She is aware of medication refill also schedule an appt. For a sick visit.-[facility]

## 2025-05-13 ENCOUNTER — OFFICE VISIT (OUTPATIENT)
Dept: FAMILY MEDICINE CLINIC | Facility: CLINIC | Age: 45
End: 2025-05-13
Payer: COMMERCIAL

## 2025-05-13 VITALS
BODY MASS INDEX: 21.44 KG/M2 | TEMPERATURE: 97.7 F | OXYGEN SATURATION: 100 % | WEIGHT: 121 LBS | SYSTOLIC BLOOD PRESSURE: 110 MMHG | DIASTOLIC BLOOD PRESSURE: 76 MMHG | HEIGHT: 63 IN | RESPIRATION RATE: 18 BRPM | HEART RATE: 82 BPM

## 2025-05-13 DIAGNOSIS — F41.1 GENERALIZED ANXIETY DISORDER: ICD-10-CM

## 2025-05-13 DIAGNOSIS — R20.0 NUMBNESS: Primary | ICD-10-CM

## 2025-05-13 PROCEDURE — 99213 OFFICE O/P EST LOW 20 MIN: CPT | Performed by: FAMILY MEDICINE

## 2025-05-13 RX ORDER — HYDROXYZINE PAMOATE 25 MG/1
25 CAPSULE ORAL DAILY PRN
Qty: 30 CAPSULE | Refills: 0 | Status: SHIPPED | OUTPATIENT
Start: 2025-05-13

## 2025-05-13 RX ORDER — ESCITALOPRAM OXALATE 10 MG/1
10 TABLET ORAL DAILY
Qty: 90 TABLET | Refills: 1 | Status: SHIPPED | OUTPATIENT
Start: 2025-05-13

## 2025-05-13 NOTE — LETTER
May 13, 2025     Patient: Yakelin Bay  YOB: 1980  Date of Visit: 5/13/2025      To Whom it May Concern:    Yakelin Bay is under my professional care. Yakelin was seen in my office on 5/13/2025. Yakelin may return to work on 5/15/25 .    If you have any questions or concerns, please don't hesitate to call.         Sincerely,          Frank Lombardi,         CC: No Recipients

## 2025-05-13 NOTE — ASSESSMENT & PLAN NOTE
Orders:  •  escitalopram (Lexapro) 10 mg tablet; Take 1 tablet (10 mg total) by mouth daily  •  hydrOXYzine pamoate (VISTARIL) 25 mg capsule; Take 1 capsule (25 mg total) by mouth daily as needed for itching or anxiety

## 2025-05-13 NOTE — PROGRESS NOTES
"Name: Yakelin Bay      : 1980      MRN: [id number]  Encounter Provider: Frank Lombardi, DO  Encounter Date: 2025   Encounter department: [facility]  :  Assessment & Plan  Numbness  I feel its from her anxiety  I will get elctrolyutes her exam including fundus etc was acceptable  Orders:  •  Comprehensive metabolic panel; Future  •  Phosphorus; Future  •  Magnesium; Future    Generalized anxiety disorder    Orders:  •  escitalopram (Lexapro) 10 mg tablet; Take 1 tablet (10 mg total) by mouth daily  •  hydrOXYzine pamoate (VISTARIL) 25 mg capsule; Take 1 capsule (25 mg total) by mouth daily as needed for itching or anxiety           History of Present Illness   Pt is sched for \"numbness'    Pt states she has a numbness sensation since the last time she was here behind her rt ear  Sghe thought it was related to her sinus infectiopn  States its freaking her out  She is veruy stressed at work  Numbness starts in the back of the ear and rad down her neck  Pt states a week ago her cheak got numb as well      Review of Systems   Neurological:  Positive for numbness.   Psychiatric/Behavioral:  The patient is nervous/anxious.        Objective   /76   Pulse 82   Temp 97.7 °F (36.5 °C)   Resp 18   Ht 5' 3\" (1.6 m)   Wt 54.9 kg (121 lb)   LMP 2025 (Exact Date)   SpO2 100%   BMI 21.43 kg/m²      Physical Exam  Vitals and nursing note reviewed.   Constitutional:       General: She is not in acute distress.     Appearance: She is well-developed. She is not diaphoretic.   HENT:      Head: Normocephalic and atraumatic.      Right Ear: External ear normal.      Left Ear: External ear normal.      Nose: Nose normal.      Mouth/Throat:      Pharynx: No oropharyngeal exudate.   Eyes:      General: No scleral icterus.        Right eye: No discharge.         Left eye: No discharge.      Pupils: Pupils are equal, round, and reactive to light.   Neck:      Thyroid: No thyromegaly. "   Cardiovascular:      Rate and Rhythm: Normal rate.      Heart sounds: Normal heart sounds. No murmur heard.  Pulmonary:      Effort: Pulmonary effort is normal. No respiratory distress.      Breath sounds: Normal breath sounds. No wheezing.   Abdominal:      General: Bowel sounds are normal. There is no distension.      Palpations: Abdomen is soft. There is no mass.      Tenderness: There is no abdominal tenderness. There is no guarding or rebound.   Musculoskeletal:         General: Normal range of motion.   Skin:     General: Skin is warm and dry.      Findings: No erythema or rash.   Neurological:      Mental Status: She is alert.      Coordination: Coordination normal.      Deep Tendon Reflexes: Reflexes normal.   Psychiatric:         Mood and Affect: Mood is anxious. Affect is tearful.         Behavior: Behavior normal.

## 2025-05-14 ENCOUNTER — APPOINTMENT (OUTPATIENT)
Dept: LAB | Facility: CLINIC | Age: 45
End: 2025-05-14
Attending: FAMILY MEDICINE
Payer: COMMERCIAL

## 2025-05-14 DIAGNOSIS — R20.0 NUMBNESS: ICD-10-CM

## 2025-05-14 LAB
ALBUMIN SERPL BCG-MCNC: 4.1 G/DL (ref 3.5–5)
ALP SERPL-CCNC: 53 U/L (ref 34–104)
ALT SERPL W P-5'-P-CCNC: 10 U/L (ref 7–52)
ANION GAP SERPL CALCULATED.3IONS-SCNC: 8 MMOL/L (ref 4–13)
AST SERPL W P-5'-P-CCNC: 14 U/L (ref 13–39)
BILIRUB SERPL-MCNC: 0.51 MG/DL (ref 0.2–1)
BUN SERPL-MCNC: 16 MG/DL (ref 5–25)
CALCIUM SERPL-MCNC: 8.9 MG/DL (ref 8.4–10.2)
CHLORIDE SERPL-SCNC: 105 MMOL/L (ref 96–108)
CHOLEST SERPL-MCNC: 184 MG/DL (ref ?–200)
CO2 SERPL-SCNC: 24 MMOL/L (ref 21–32)
CREAT SERPL-MCNC: 0.62 MG/DL (ref 0.6–1.3)
GFR SERPL CREATININE-BSD FRML MDRD: 110 ML/MIN/1.73SQ M
GLUCOSE P FAST SERPL-MCNC: 100 MG/DL (ref 65–99)
HDLC SERPL-MCNC: 51 MG/DL
LDLC SERPL CALC-MCNC: 120 MG/DL (ref 0–100)
MAGNESIUM SERPL-MCNC: 2.3 MG/DL (ref 1.9–2.7)
PHOSPHATE SERPL-MCNC: 4.2 MG/DL (ref 2.7–4.5)
POTASSIUM SERPL-SCNC: 4 MMOL/L (ref 3.5–5.3)
PROT SERPL-MCNC: 6.3 G/DL (ref 6.4–8.4)
SODIUM SERPL-SCNC: 137 MMOL/L (ref 135–147)
TRIGL SERPL-MCNC: 63 MG/DL (ref ?–150)

## 2025-05-14 PROCEDURE — 84100 ASSAY OF PHOSPHORUS: CPT

## 2025-05-14 PROCEDURE — 80053 COMPREHEN METABOLIC PANEL: CPT

## 2025-05-14 PROCEDURE — 80061 LIPID PANEL: CPT

## 2025-05-14 PROCEDURE — 83735 ASSAY OF MAGNESIUM: CPT

## 2025-05-14 PROCEDURE — 36415 COLL VENOUS BLD VENIPUNCTURE: CPT

## 2025-06-09 DIAGNOSIS — F51.01 PRIMARY INSOMNIA: ICD-10-CM

## 2025-06-09 RX ORDER — ZOLPIDEM TARTRATE 5 MG/1
2.5 TABLET ORAL
Qty: 15 TABLET | Refills: 0 | Status: SHIPPED | OUTPATIENT
Start: 2025-06-09

## 2025-07-14 DIAGNOSIS — F51.01 PRIMARY INSOMNIA: ICD-10-CM

## 2025-07-14 RX ORDER — ZOLPIDEM TARTRATE 5 MG/1
2.5 TABLET ORAL
Qty: 15 TABLET | Refills: 0 | Status: SHIPPED | OUTPATIENT
Start: 2025-07-14

## 2025-07-22 ENCOUNTER — OFFICE VISIT (OUTPATIENT)
Dept: FAMILY MEDICINE CLINIC | Facility: CLINIC | Age: 45
End: 2025-07-22
Payer: COMMERCIAL

## 2025-07-22 VITALS
BODY MASS INDEX: 21.44 KG/M2 | HEART RATE: 91 BPM | TEMPERATURE: 98.2 F | DIASTOLIC BLOOD PRESSURE: 70 MMHG | WEIGHT: 121 LBS | OXYGEN SATURATION: 98 % | SYSTOLIC BLOOD PRESSURE: 116 MMHG | HEIGHT: 63 IN | RESPIRATION RATE: 18 BRPM

## 2025-07-22 DIAGNOSIS — E78.00 ELEVATED LOW-DENSITY LIPOPROTEIN LEVEL: ICD-10-CM

## 2025-07-22 DIAGNOSIS — E61.1 IRON DEFICIENCY: ICD-10-CM

## 2025-07-22 DIAGNOSIS — F41.0 PANIC ATTACKS: ICD-10-CM

## 2025-07-22 DIAGNOSIS — F41.1 GENERALIZED ANXIETY DISORDER: Primary | ICD-10-CM

## 2025-07-22 DIAGNOSIS — Z12.31 ENCOUNTER FOR SCREENING MAMMOGRAM FOR HIGH-RISK PATIENT: ICD-10-CM

## 2025-07-22 PROCEDURE — 99214 OFFICE O/P EST MOD 30 MIN: CPT | Performed by: FAMILY MEDICINE

## 2025-07-22 NOTE — PROGRESS NOTES
Name: Yakelin Bay      : 1980      MRN: [id number]  Encounter Provider: Frank Lombardi, DO  Encounter Date: 2025   Encounter department: [facility]  :  Assessment & Plan  Generalized anxiety disorder  Much better without previous work       Panic attacks  Have used the visteril episodically       Encounter for screening mammogram for high-risk patient    Orders:  •  Mammo screening bilateral w 3d and cad; Future    Elevated low-density lipoprotein level  Pt has a good diet and exercises  Her father had thios  She stillmenstruates       Iron deficiency  Pt would like a ferritin level drawn  Orders:  •  TIBC Panel (incl. Iron, TIBC, % Iron Saturation); Future           History of Present Illness   Pt is here to follow up her lexapro  PT states she stopped the lexapro - she actually quit her job  Numbness has greatly improved  Anxiety has improved    Pt has an obgyn amnd will be seeing her for pap/pelvic.       Review of Systems   Constitutional: Negative.  Negative for activity change, appetite change, chills, diaphoresis and fatigue.   HENT: Negative.  Negative for dental problem, ear pain, sinus pressure and sore throat.    Eyes: Negative.  Negative for photophobia, pain, discharge, redness, itching and visual disturbance.   Respiratory:  Negative for apnea and chest tightness.    Cardiovascular: Negative.  Negative for chest pain, palpitations and leg swelling.   Gastrointestinal: Negative.  Negative for abdominal distention, abdominal pain, constipation and diarrhea.   Endocrine: Negative.  Negative for cold intolerance and heat intolerance.   Genitourinary: Negative.  Negative for difficulty urinating and dyspareunia.   Musculoskeletal: Negative.  Negative for arthralgias and back pain.   Skin: Negative.    Allergic/Immunologic: Negative for environmental allergies.   Neurological: Negative.  Negative for dizziness.   Psychiatric/Behavioral: Negative.  Negative for agitation.   "      Objective   /70   Pulse 91   Temp 98.2 °F (36.8 °C)   Resp 18   Ht 5' 3\" (1.6 m)   Wt 54.9 kg (121 lb)   LMP 07/01/2025 (Approximate)   SpO2 98%   BMI 21.43 kg/m²      Physical Exam  Vitals and nursing note reviewed.   Constitutional:       General: She is not in acute distress.     Appearance: She is well-developed. She is not diaphoretic.   HENT:      Head: Normocephalic and atraumatic.      Right Ear: External ear normal.      Left Ear: External ear normal.      Nose: Nose normal.      Mouth/Throat:      Pharynx: No oropharyngeal exudate.     Eyes:      General: No scleral icterus.        Right eye: No discharge.         Left eye: No discharge.      Pupils: Pupils are equal, round, and reactive to light.     Neck:      Thyroid: No thyromegaly.     Cardiovascular:      Rate and Rhythm: Normal rate.      Heart sounds: Normal heart sounds. No murmur heard.  Pulmonary:      Effort: Pulmonary effort is normal. No respiratory distress.      Breath sounds: Normal breath sounds. No wheezing.   Abdominal:      General: Bowel sounds are normal. There is no distension.      Palpations: Abdomen is soft. There is no mass.      Tenderness: There is no abdominal tenderness. There is no guarding or rebound.     Musculoskeletal:         General: Normal range of motion.     Skin:     General: Skin is warm and dry.      Findings: No erythema or rash.     Neurological:      Mental Status: She is alert.      Coordination: Coordination normal.      Deep Tendon Reflexes: Reflexes normal.     Psychiatric:         Behavior: Behavior normal.         "

## (undated) DEVICE — SUT MONOCRYL 4-0 PS-2 27 IN Y426H

## (undated) DEVICE — ADHESIVE SKIN HIGH VISCOSITY EXOFIN 1ML

## (undated) DEVICE — GLOVE SRG BIOGEL ECLIPSE 6.5

## (undated) DEVICE — SUT VICRYL 0 CT-1 36 IN J946H

## (undated) DEVICE — Device

## (undated) DEVICE — SUT MONOCRYL 0 CTX 36 IN Y398H

## (undated) DEVICE — PACK C-SECTION PBDS

## (undated) DEVICE — SKIN MARKER DUAL TIP WITH RULER CAP, FLEXIBLE RULER AND LABELS: Brand: DEVON

## (undated) DEVICE — CHLORAPREP HI-LITE 26ML ORANGE